# Patient Record
Sex: MALE | Race: WHITE | NOT HISPANIC OR LATINO | Employment: OTHER | URBAN - NONMETROPOLITAN AREA
[De-identification: names, ages, dates, MRNs, and addresses within clinical notes are randomized per-mention and may not be internally consistent; named-entity substitution may affect disease eponyms.]

---

## 2023-04-04 ENCOUNTER — HOSPITAL ENCOUNTER (INPATIENT)
Facility: HOSPITAL | Age: 73
LOS: 7 days | Discharge: HOME | DRG: 374 | End: 2023-04-11
Attending: FAMILY MEDICINE | Admitting: INTERNAL MEDICINE
Payer: OTHER GOVERNMENT

## 2023-04-04 DIAGNOSIS — Z74.09 IMPAIRED MOBILITY: ICD-10-CM

## 2023-04-04 DIAGNOSIS — I31.39 PERICARDIAL EFFUSION: Primary | ICD-10-CM

## 2023-04-04 PROCEDURE — 94799 UNLISTED PULMONARY SVC/PX: CPT

## 2023-04-04 PROCEDURE — 94660 CPAP INITIATION&MGMT: CPT

## 2023-04-04 RX ORDER — FENOFIBRATE 54 MG/1
54 TABLET ORAL DAILY
COMMUNITY

## 2023-04-04 RX ORDER — FUROSEMIDE 10 MG/ML
60 INJECTION INTRAMUSCULAR; INTRAVENOUS EVERY 12 HOURS
Status: DISCONTINUED | OUTPATIENT
Start: 2023-04-04 | End: 2023-04-05

## 2023-04-04 RX ORDER — COLCHICINE 0.6 MG/1
0.6 TABLET ORAL 2 TIMES DAILY
COMMUNITY

## 2023-04-04 RX ORDER — DOCUSATE SODIUM 100 MG/1
100 CAPSULE, LIQUID FILLED ORAL DAILY
COMMUNITY

## 2023-04-04 RX ORDER — TRAMADOL HYDROCHLORIDE 50 MG/1
50 TABLET ORAL 2 TIMES DAILY PRN
Status: DISCONTINUED | OUTPATIENT
Start: 2023-04-04 | End: 2023-04-11 | Stop reason: HOSPADM

## 2023-04-04 RX ORDER — MULTIPLE VITAMINS W/ MINERALS TAB 9MG-400MCG
1 TAB ORAL DAILY
COMMUNITY

## 2023-04-04 RX ORDER — FUROSEMIDE 40 MG/1
40 TABLET ORAL 2 TIMES DAILY
COMMUNITY

## 2023-04-04 RX ORDER — MELATONIN
1000 DAILY
COMMUNITY

## 2023-04-04 RX ORDER — TRAMADOL HYDROCHLORIDE 50 MG/1
50 TABLET ORAL 2 TIMES DAILY PRN
COMMUNITY

## 2023-04-04 RX ORDER — FERROUS SULFATE 325(65) MG
325 TABLET ORAL
COMMUNITY

## 2023-04-04 RX ORDER — HEPARIN SODIUM 5000 [USP'U]/ML
5000 INJECTION, SOLUTION INTRAVENOUS; SUBCUTANEOUS EVERY 8 HOURS SCHEDULED
Status: DISCONTINUED | OUTPATIENT
Start: 2023-04-04 | End: 2023-04-04

## 2023-04-04 RX ORDER — BUSPIRONE HYDROCHLORIDE 10 MG/1
10 TABLET ORAL 2 TIMES DAILY
COMMUNITY

## 2023-04-04 RX ORDER — COLCHICINE 0.6 MG/1
0.6 TABLET ORAL 2 TIMES DAILY
Status: DISCONTINUED | OUTPATIENT
Start: 2023-04-05 | End: 2023-04-11 | Stop reason: HOSPADM

## 2023-04-04 RX ORDER — BUSPIRONE HYDROCHLORIDE 10 MG/1
10 TABLET ORAL 2 TIMES DAILY
Status: DISCONTINUED | OUTPATIENT
Start: 2023-04-05 | End: 2023-04-11 | Stop reason: HOSPADM

## 2023-04-04 RX ORDER — MIRTAZAPINE 15 MG/1
15 TABLET, FILM COATED ORAL NIGHTLY
Status: DISCONTINUED | OUTPATIENT
Start: 2023-04-05 | End: 2023-04-11 | Stop reason: HOSPADM

## 2023-04-04 RX ORDER — PRAZOSIN HYDROCHLORIDE 5 MG/1
5-10 CAPSULE ORAL 2 TIMES DAILY
COMMUNITY

## 2023-04-04 RX ORDER — SOTALOL HYDROCHLORIDE 80 MG/1
80 TABLET ORAL 2 TIMES DAILY
COMMUNITY

## 2023-04-04 RX ORDER — MIRTAZAPINE 30 MG/1
15 TABLET, FILM COATED ORAL NIGHTLY
COMMUNITY

## 2023-04-04 RX ORDER — SOTALOL HYDROCHLORIDE 80 MG/1
80 TABLET ORAL 2 TIMES DAILY
Status: DISCONTINUED | OUTPATIENT
Start: 2023-04-05 | End: 2023-04-11 | Stop reason: HOSPADM

## 2023-04-04 RX ORDER — SERTRALINE HYDROCHLORIDE 100 MG/1
200 TABLET, FILM COATED ORAL DAILY
COMMUNITY

## 2023-04-04 RX ORDER — AMLODIPINE BESYLATE 10 MG/1
5 TABLET ORAL DAILY
COMMUNITY

## 2023-04-04 NOTE — Clinical Note
1150 ML BLOOD REMOVED FROM PERICARDIAL SACK. PATIENT REPORTS IMPROVEMENT IN BREATHING. BLOOD TO BE WALKED TO LAB FOR TESTING.

## 2023-04-04 NOTE — Clinical Note
Prepped: subxiphoid process and left abdomen. Prepped with: ChloraPrep. The site was clipped. The patient was draped in a sterile fashion. Apical.

## 2023-04-04 NOTE — Clinical Note
Pericardiocentesis performed by manual aspiration.Bloody fluid removed and 800 mL was sent to lab for analysis..

## 2023-04-05 ENCOUNTER — APPOINTMENT (OUTPATIENT)
Dept: CT IMAGING | Facility: HOSPITAL | Age: 73
DRG: 374 | End: 2023-04-05
Payer: OTHER GOVERNMENT

## 2023-04-05 ENCOUNTER — APPOINTMENT (OUTPATIENT)
Dept: CARDIOLOGY | Facility: HOSPITAL | Age: 73
DRG: 374 | End: 2023-04-05
Payer: OTHER GOVERNMENT

## 2023-04-05 PROBLEM — G47.33 OBSTRUCTIVE SLEEP APNEA: Status: ACTIVE | Noted: 2023-04-05

## 2023-04-05 PROBLEM — I10 ESSENTIAL HYPERTENSION: Status: ACTIVE | Noted: 2023-04-05

## 2023-04-05 PROBLEM — N18.31 STAGE 3A CHRONIC KIDNEY DISEASE: Status: ACTIVE | Noted: 2023-04-05

## 2023-04-05 PROBLEM — I31.39 PERICARDIAL EFFUSION: Status: ACTIVE | Noted: 2023-04-05

## 2023-04-05 PROBLEM — E66.01 OBESITY, MORBID, BMI 50 OR HIGHER: Status: ACTIVE | Noted: 2023-04-05

## 2023-04-05 PROBLEM — I48.0 PAROXYSMAL ATRIAL FIBRILLATION: Status: ACTIVE | Noted: 2023-04-05

## 2023-04-05 LAB
ANION GAP SERPL CALCULATED.3IONS-SCNC: 10 MMOL/L (ref 5–15)
BASOPHILS # BLD AUTO: 0.02 10*3/MM3 (ref 0–0.2)
BASOPHILS NFR BLD AUTO: 0.2 % (ref 0–1.5)
BH CV ECHO MEAS - AO MAX PG: 4.2 MMHG
BH CV ECHO MEAS - AO MEAN PG: 2 MMHG
BH CV ECHO MEAS - AO ROOT DIAM: 3.3 CM
BH CV ECHO MEAS - AO V2 MAX: 103 CM/SEC
BH CV ECHO MEAS - AO V2 VTI: 18 CM
BH CV ECHO MEAS - AVA(I,D): 2.9 CM2
BH CV ECHO MEAS - EDV(CUBED): 140.6 ML
BH CV ECHO MEAS - ESV(CUBED): 27 ML
BH CV ECHO MEAS - FS: 42.3 %
BH CV ECHO MEAS - IVS/LVPW: 0.92 CM
BH CV ECHO MEAS - IVSD: 1.2 CM
BH CV ECHO MEAS - LA DIMENSION: 4.2 CM
BH CV ECHO MEAS - LAT PEAK E' VEL: 8.5 CM/SEC
BH CV ECHO MEAS - LV MASS(C)D: 263.4 GRAMS
BH CV ECHO MEAS - LV MAX PG: 2.6 MMHG
BH CV ECHO MEAS - LV MEAN PG: 1 MMHG
BH CV ECHO MEAS - LV V1 MAX: 80.4 CM/SEC
BH CV ECHO MEAS - LV V1 VTI: 12.7 CM
BH CV ECHO MEAS - LVIDD: 5.2 CM
BH CV ECHO MEAS - LVIDS: 3 CM
BH CV ECHO MEAS - LVOT AREA: 4.2 CM2
BH CV ECHO MEAS - LVOT DIAM: 2.3 CM
BH CV ECHO MEAS - LVPWD: 1.3 CM
BH CV ECHO MEAS - MED PEAK E' VEL: 5.7 CM/SEC
BH CV ECHO MEAS - MV A MAX VEL: 63.4 CM/SEC
BH CV ECHO MEAS - MV DEC SLOPE: 189 CM/SEC2
BH CV ECHO MEAS - MV DEC TIME: 0.24 MSEC
BH CV ECHO MEAS - MV E MAX VEL: 45.2 CM/SEC
BH CV ECHO MEAS - MV E/A: 0.71
BH CV ECHO MEAS - MV MAX PG: 1.83 MMHG
BH CV ECHO MEAS - MV MEAN PG: 1 MMHG
BH CV ECHO MEAS - MV V2 VTI: 19.4 CM
BH CV ECHO MEAS - MVA(VTI): 2.7 CM2
BH CV ECHO MEAS - PA V2 MAX: 40 CM/SEC
BH CV ECHO MEAS - RAP SYSTOLE: 5 MMHG
BH CV ECHO MEAS - RV MAX PG: 0.75 MMHG
BH CV ECHO MEAS - RV V1 MAX: 43.3 CM/SEC
BH CV ECHO MEAS - RV V1 VTI: 8.3 CM
BH CV ECHO MEAS - RVDD: 3.6 CM
BH CV ECHO MEAS - RVSP: 22.3 MMHG
BH CV ECHO MEAS - SV(LVOT): 52.8 ML
BH CV ECHO MEAS - TAPSE (>1.6): 3.4 CM
BH CV ECHO MEAS - TR MAX PG: 17.3 MMHG
BH CV ECHO MEAS - TR MAX VEL: 208 CM/SEC
BH CV ECHO MEASUREMENTS AVERAGE E/E' RATIO: 6.37
BH CV XLRA - TDI S': 13.7 CM/SEC
BUN SERPL-MCNC: 42 MG/DL (ref 8–23)
BUN/CREAT SERPL: 30 (ref 7–25)
CALCIUM SPEC-SCNC: 9 MG/DL (ref 8.6–10.5)
CHLORIDE SERPL-SCNC: 102 MMOL/L (ref 98–107)
CO2 SERPL-SCNC: 33 MMOL/L (ref 22–29)
CREAT SERPL-MCNC: 1.4 MG/DL (ref 0.76–1.27)
CRP SERPL-MCNC: <0.3 MG/DL (ref 0–0.5)
DEPRECATED RDW RBC AUTO: 50 FL (ref 37–54)
EGFRCR SERPLBLD CKD-EPI 2021: 53.1 ML/MIN/1.73
EOSINOPHIL # BLD AUTO: 0.06 10*3/MM3 (ref 0–0.4)
EOSINOPHIL NFR BLD AUTO: 0.5 % (ref 0.3–6.2)
ERYTHROCYTE [DISTWIDTH] IN BLOOD BY AUTOMATED COUNT: 14.3 % (ref 12.3–15.4)
GLUCOSE SERPL-MCNC: 111 MG/DL (ref 65–99)
HCT VFR BLD AUTO: 42.1 % (ref 37.5–51)
HGB BLD-MCNC: 12.7 G/DL (ref 13–17.7)
IMM GRANULOCYTES # BLD AUTO: 0.1 10*3/MM3 (ref 0–0.05)
IMM GRANULOCYTES NFR BLD AUTO: 0.8 % (ref 0–0.5)
LEFT ATRIUM VOLUME INDEX: 40 ML/M2
LEFT ATRIUM VOLUME: 86.7 ML
LYMPHOCYTES # BLD AUTO: 0.59 10*3/MM3 (ref 0.7–3.1)
LYMPHOCYTES NFR BLD AUTO: 4.6 % (ref 19.6–45.3)
MAXIMAL PREDICTED HEART RATE: 147 BPM
MCH RBC QN AUTO: 28.7 PG (ref 26.6–33)
MCHC RBC AUTO-ENTMCNC: 30.2 G/DL (ref 31.5–35.7)
MCV RBC AUTO: 95.2 FL (ref 79–97)
MONOCYTES # BLD AUTO: 0.8 10*3/MM3 (ref 0.1–0.9)
MONOCYTES NFR BLD AUTO: 6.2 % (ref 5–12)
NEUTROPHILS NFR BLD AUTO: 11.24 10*3/MM3 (ref 1.7–7)
NEUTROPHILS NFR BLD AUTO: 87.7 % (ref 42.7–76)
NRBC BLD AUTO-RTO: 0 /100 WBC (ref 0–0.2)
PLATELET # BLD AUTO: 132 10*3/MM3 (ref 140–450)
PMV BLD AUTO: 12.2 FL (ref 6–12)
POTASSIUM SERPL-SCNC: 3.8 MMOL/L (ref 3.5–5.2)
RBC # BLD AUTO: 4.42 10*6/MM3 (ref 4.14–5.8)
SODIUM SERPL-SCNC: 145 MMOL/L (ref 136–145)
STRESS TARGET HR: 125 BPM
TSH SERPL DL<=0.05 MIU/L-ACNC: 2.72 UIU/ML (ref 0.27–4.2)
WBC NRBC COR # BLD: 12.81 10*3/MM3 (ref 3.4–10.8)

## 2023-04-05 PROCEDURE — 99232 SBSQ HOSP IP/OBS MODERATE 35: CPT | Performed by: SURGERY

## 2023-04-05 PROCEDURE — 25010000002 HEPARIN (PORCINE) PER 1000 UNITS: Performed by: INTERNAL MEDICINE

## 2023-04-05 PROCEDURE — 71250 CT THORAX DX C-: CPT

## 2023-04-05 PROCEDURE — 93306 TTE W/DOPPLER COMPLETE: CPT

## 2023-04-05 PROCEDURE — 86140 C-REACTIVE PROTEIN: CPT | Performed by: INTERNAL MEDICINE

## 2023-04-05 PROCEDURE — 80048 BASIC METABOLIC PNL TOTAL CA: CPT | Performed by: INTERNAL MEDICINE

## 2023-04-05 PROCEDURE — 84443 ASSAY THYROID STIM HORMONE: CPT | Performed by: INTERNAL MEDICINE

## 2023-04-05 PROCEDURE — 94799 UNLISTED PULMONARY SVC/PX: CPT

## 2023-04-05 PROCEDURE — 94761 N-INVAS EAR/PLS OXIMETRY MLT: CPT

## 2023-04-05 PROCEDURE — 25510000001 PERFLUTREN 6.52 MG/ML SUSPENSION: Performed by: INTERNAL MEDICINE

## 2023-04-05 PROCEDURE — 25010000002 FUROSEMIDE PER 20 MG: Performed by: INTERNAL MEDICINE

## 2023-04-05 PROCEDURE — 99222 1ST HOSP IP/OBS MODERATE 55: CPT | Performed by: INTERNAL MEDICINE

## 2023-04-05 PROCEDURE — 94660 CPAP INITIATION&MGMT: CPT

## 2023-04-05 PROCEDURE — 93306 TTE W/DOPPLER COMPLETE: CPT | Performed by: INTERNAL MEDICINE

## 2023-04-05 PROCEDURE — 85025 COMPLETE CBC W/AUTO DIFF WBC: CPT | Performed by: INTERNAL MEDICINE

## 2023-04-05 PROCEDURE — 74176 CT ABD & PELVIS W/O CONTRAST: CPT

## 2023-04-05 RX ORDER — HEPARIN SODIUM 5000 [USP'U]/ML
5000 INJECTION, SOLUTION INTRAVENOUS; SUBCUTANEOUS EVERY 8 HOURS SCHEDULED
Status: DISCONTINUED | OUTPATIENT
Start: 2023-04-05 | End: 2023-04-07

## 2023-04-05 RX ADMIN — SOTALOL HYDROCHLORIDE 80 MG: 80 TABLET ORAL at 00:38

## 2023-04-05 RX ADMIN — COLCHICINE 0.6 MG: 0.6 TABLET ORAL at 21:06

## 2023-04-05 RX ADMIN — FUROSEMIDE 60 MG: 10 INJECTION, SOLUTION INTRAMUSCULAR; INTRAVENOUS at 00:37

## 2023-04-05 RX ADMIN — SOTALOL HYDROCHLORIDE 80 MG: 80 TABLET ORAL at 08:34

## 2023-04-05 RX ADMIN — HEPARIN SODIUM 5000 UNITS: 5000 INJECTION INTRAVENOUS; SUBCUTANEOUS at 06:26

## 2023-04-05 RX ADMIN — MIRTAZAPINE 15 MG: 15 TABLET, FILM COATED ORAL at 21:06

## 2023-04-05 RX ADMIN — BUSPIRONE HYDROCHLORIDE 10 MG: 10 TABLET ORAL at 21:06

## 2023-04-05 RX ADMIN — MIRTAZAPINE 15 MG: 15 TABLET, FILM COATED ORAL at 00:36

## 2023-04-05 RX ADMIN — COLCHICINE 0.6 MG: 0.6 TABLET ORAL at 08:34

## 2023-04-05 RX ADMIN — BUSPIRONE HYDROCHLORIDE 10 MG: 10 TABLET ORAL at 08:34

## 2023-04-05 RX ADMIN — PERFLUTREN 13.04 MG: 6.52 INJECTION, SUSPENSION INTRAVENOUS at 11:41

## 2023-04-05 RX ADMIN — HEPARIN SODIUM 5000 UNITS: 5000 INJECTION INTRAVENOUS; SUBCUTANEOUS at 14:51

## 2023-04-05 RX ADMIN — HEPARIN SODIUM 5000 UNITS: 5000 INJECTION INTRAVENOUS; SUBCUTANEOUS at 21:06

## 2023-04-05 RX ADMIN — COLCHICINE 0.6 MG: 0.6 TABLET ORAL at 00:37

## 2023-04-05 RX ADMIN — SOTALOL HYDROCHLORIDE 80 MG: 80 TABLET ORAL at 21:06

## 2023-04-05 RX ADMIN — APIXABAN 5 MG: 5 TABLET, FILM COATED ORAL at 00:36

## 2023-04-05 RX ADMIN — BUSPIRONE HYDROCHLORIDE 10 MG: 10 TABLET ORAL at 00:36

## 2023-04-05 NOTE — PLAN OF CARE
Goal Outcome Evaluation:  Plan of Care Reviewed With: patient        Progress: no change  Outcome Evaluation: Patient transferred from [facility] with pericardial effusion, requires 3L oxygen. CPAP worn overnight. CT sugery and cardiology consult placed. Patient to get CTA in am. VSS, safety maintained. S/AF/SA 64-91 on tele.

## 2023-04-05 NOTE — PAYOR COMM NOTE
"4/5/23 [facility]    [phone]  PHONE [phone]    ER ADMIT TO INPATIENT ON 4/4/23. FAMARIA ELENA BLACKWELL          Norm Prado (72 y.o. Male)     Date of Birth   1950    Social Security Number       Address   [address] [place] [address]    Home Phone   [phone]    MRN   [id number]       Gnosticist   Other    Marital Status                               Admission Date   4/4/23    Admission Type   Urgent    Admitting Provider   Vinay Noland MD    Attending Provider   Ivis Spain    Department, Room/Bed   [facility] 4B, 465/1       Discharge Date       Discharge Disposition       Discharge Destination                               Attending Provider: Ivis Spain    Allergies: No Known Allergies    Isolation: None   Infection: None   Code Status: CPR    Ht: 155.4 cm (61.2\")   Wt: 125 kg (276 lb 9.6 oz)    Admission Cmt: None   Principal Problem: Pericardial effusion [I31.39]                 Active Insurance as of 4/4/2023     Primary Coverage     Payor Plan Insurance Group Employer/Plan Group    [facility] CCN OPTUM      Payor Plan Address Payor Plan Phone Number Payor Plan Fax Number Effective Dates    [address] [phone]  4/4/2023 - None Entered    [facility] [id number]       Subscriber Name Subscriber Birth Date Member ID       NORM PRADO 1950 [id number]           Secondary Coverage     Payor Plan Insurance Group Employer/Plan Group    [facility] MEDICARE REPLACEMENT [facility] MEDICARE REPLACEMENT [id number]     Payor Plan Address Payor Plan Phone Number Payor Plan Fax Number Effective Dates    [address] [address]   1/1/2023 - None Entered    [facility] [id number]       Subscriber Name Subscriber Birth Date Member ID       NORM PRADO 1950 [id number]                 Emergency Contacts      (Rel.) Home Phone Work Phone Mobile Phone    EVEMJ GUERRA (Spouse) -- -- [phone]    " RAFAEL KAISER (Relative) -- -- --           [facility] Encounter Date/Time: 2023 2102   Hospital Account: [id number]    MRN: [id number]   Patient:  Norm Prado   Contact Serial #: [id number]   SSN:          ENCOUNTER             Patient Class: Inpatient   Unit: [address] Service: Medicine     Bed: 465/1   Admitting Provider: Vinay Noland MD   Referring Physician: Provider, No Known   Attending Provider: Ivis Spain Diagnosis: Pericardial effusion [I3*               PATIENT          Name: Norm Prado : 1950 (72 yrs)   Address: [address] [place] [address] Sex: Male   City: [place] [address]   County: [place]   Marital Status:  Ethnicity: NOT                                                                              Race: WHITE   Primary Care Provider: Provider, No Known Patients Phone: Home Phone: [phone]         EMERGENCY CONTACT   Contact Name Legal Guardian? Relationship to Patient Home Phone Work Phone   1. MJ PRADO  2. RAFAEL KAISER No  No Spouse  Relative                GUARANTOR            Guarantor: Norm Prado     : 1950   Address: [address] [place] [address] Sex: Male     [place] [address]     Relation to Patient: Self       Home Phone: [phone]   Guarantor ID: [id number]       Work Phone:     GUARANTOR EMPLOYER   Employer:           Status: RETIRED   COVERAGE          PRIMARY INSURANCE   Payor: [employer] Plan: [facility] [place]   Group Number:   Insurance Type: INDEMNITY   Subscriber Name: NORM PRADO Subscriber : 1950   Subscriber ID: [id number] Coverage Address: [facility] [id number] [place] [address]   Pat. Rel. to Subscriber: Self Coverage Phone: [phone]   SECONDARY INSURANCE   Payor: [employer] MEDICARE REPLACEMENT Plan: [employer] MEDICARE REPLACEMENT   Group Number: [id number] Insurance Type: INDEMNITY   Subscriber Name: NORM PRADO Subscriber : 1950   Subscriber ID: [id number] Coverage Address:   [address] [address]  [place] [address]   Pat. Rel. to Subscriber: SELF Coverage Phone: [phone]      Contact Serial # ([id number])         April 5, 2023    Chart ID ([id number])           Vinay Noland MD   Physician  Hospitalist  H&P      Signed  Date of Service:  04/04/23 2141  Creation Time:  04/04/23 2141     Signed        [facility]  HISTORY AND PHYSICAL     Date of Admission: 4/4/2023  Primary Care Physician: Provider, No Known     Subjective   Primary Historian: Patient        Chief Complaint: Shortness of breath        History of Present Illness  Patient is a 72-year-old male with medical history of morbid obesity, A-fib on sotalol and Eliquis, FELIX on CPAP at night, hypertension, presents to [facility] with complaints of worsening shortness of breath.  Patient reports that he was recently diagnosed with FELIX and prescribed CPAP machine at home which he has been compliant with.  He also reported being diagnosed with pericardial fusion in March where he was managed at [facility] for A-fib RVR and pericardial effusion.  He was started on colchicine at that time, and lasix and discharged home.  He represented at [facility] with worsening shortness of breath noted to be in A-fib RVR as well.  Patient was started on Cardizem drip, placed on BiPAP for airway support, CT chest reported large pericardial effusion with bilateral pleural effusions but no sign of tamponade.  Patient was discussed with CT surgery Dr. Yip according to chart review and patient was accepted for transfer for CT surgery evaluation and management.  Patient seen on arrival to this facility on oxygen supplementation, denies chest pain or any other symptoms or complaints.  Patient admitted for medical care.           Review of Systems   Otherwise complete ROS reviewed and negative except as  "mentioned in the HPI.     Past Medical History: Morbid obesity, A-fib, FELIX on CPAP, hypertension, pericardial fusion, pleural effusion,  Past Surgical History:  Surgical History   History reviewed. No pertinent surgical history.     Social History:  reports that he quit smoking about 3 years ago. His smoking use included cigarettes. He has never used smokeless tobacco. He reports that he does not currently use alcohol. He reports that he does not use drugs.     Family History: CAD, hypertension,     Allergies:  No Known Allergies     Medications:              Vital Signs: /72 (BP Location: Right arm, Patient Position: Lying)   Pulse 81   Temp 97.4 °F (36.3 °C)   Resp 18   Ht 155.4 cm (61.2\")   Wt 125 kg (276 lb 9.6 oz)   SpO2 93%   BMI 51.92 kg/m²   Physical Exam:   Temp:  [97.4 °F (36.3 °C)-97.6 °F (36.4 °C)] 97.6 °F (36.4 °C)  Heart Rate:  [81-83] 81  Resp:  [18-20] 20  BP: ()/(70-72) 96/70  Physical Exam     General: Morbidly obese NC/AT, appears stated age, alert and oriented x3  HEENT: PERRLA, EOMI, no scleral icterus, no conjunctival injection,   NECK:  Neck is supple, no JVD, no supraclavicular lymphadenopathy  CV: S1 S2 irregularly irregular  no murmurs, no gallops, no heaves, pulses palpable.  LUNG: Diminished breath sounds lower lung fields with poor air movement.  ABD: Large abdominal girth, obese, nondistended, nontender, difficult to auscultate due to body habitus, no guarding or rebound.  EXT: FROM, strength is intact, 2+ pitting edema, no joint effusion, no calf tenderness.  Pulses palpable  Neuro: CN 2-12, grossly intact,no  focal weakness appreciated  Skin: Warm and dry, no rash or lesions.        Results Reviewed:  Lab work from transferring facility.  Chemistry: Sodium 144, potassium 3.8, chloride 102, CO2 32.5, BUN 43, creatinine 1.55, total protein 6.9, albumin 3.5, total bilirubin 0.51, alk phos 69, AST 19, ALT 44, calcium 8.2,            Imaging Results (Last 24 Hours)      " ** No results found for the last 24 hours. **          I have personally reviewed and interpreted the radiology studies and ECG obtained at time of admission.      Assessment / Plan   Assessment and plan:   72-year-old male with medical history of morbid obesity, A-fib on sotalol and Eliquis, FELIX, hypertension, presenting with worsening pleural effusion and pericardial effusion with no tamponade.  Patient is also respiratory distress requiring oxygen supplementation.  Patient also in A-fib RVR due to respiratory distress. He is admitted for further medical care.           Problem list:  1.  Acute on chronic respiratory failure  2.  A-fib RVR  3.  Bilateral pleural effusion  4.  Pericardial effusion without tamponade  5.  Fluid overload  6.  Acute renal failure           Treatment Plan  The patient will be admitted to my service here at [facility].  -Admit to inpatient  - Oxygen supplementation, CPAP/BiPAP as needed  - Telemetry monitoring  - IV Lopressor and Cardizem for A-fib RVR, consider Cardizem drip if difficult to control  - Aggressive IV diuresing Lasix 60 mg twice a day  - Breathing treatments with DuoNebs  - Patient had recent echocardiogram, defer repeating echo to cardiology.  Patient may require LUCAS instead.  - Hold Eliquis anticoagulation for possible intervention by CT surgery.  - Consult CT surgery in the a.m.  Consider CT abdomen/pelvis for work-up of his volume overload, Per echo reportedly patient has no history of heart failure as EF is 55 to 65% with mild LVH.     Reviewed the rest of his home medications, restart those appropriate for management of his other comorbid conditions.        Patient is a full code  Time spent in admission greater than 30 minutes.  Anticipate length of stay greater than 2 nights.        Medical Decision Making  As discussed above        Conditions and Status  Patient is clinically stable        MDM Data  External documents reviewed: As discussed  above  Cardiac tracing (EKG, telemetry) interpretation: As discussed above  Radiology interpretation: As discussed above  Labs reviewed: As discussed above           Care Planning  Patient is a full code     Disposition  Patient can discharge home after resolution of acute condition and returned to baseline.  No  needed identified at this time.            Lisa Hurst, RN   Registered Nurse  Cardiology  Plan of Care      Signed  Date of Service:  04/05/23 0355  Creation Time:  04/05/23 0355     Signed           Goal Outcome Evaluation:  Plan of Care Reviewed With: patient  Progress: no change  Outcome Evaluation: Patient transferred from [facility] with pericardial effusion, requires 3L oxygen. CPAP worn overnight. CT sugery and cardiology consult placed. Patient to get CTA in am. VSS, safety maintained. S/AF/SA 64-91 on tele.                 Shayne Eid MD   Physician  Cardiology  Consults      Signed  Date of Service:  04/05/23 0738  Creation Time:  04/05/23 0738  Consult Orders   Inpatient Cardiology Consult [[id number]] ordered by Vinay Noland MD at 04/04/23 2314          Signed        Expand AllCollapse All        LOS: 1 day   Patient Care Team:  Provider, No Known as PCP - General     Chief Complaint: Shortness of breath     Ez Tracey is a 72 y.o. male who is being seen in evaluation.  Patient has been admitted with substantial pericardial effusion  According to patient he needed hip surgery  This subsequently resulted in further work-up  He was noted to have a large pericardial effusion  Was in outside hospital  Was transferred to [facility] to address this pericardial effusion  CT surgery has been consulted  Denies any chest pain  No sustained palpitations  Cardiac monitor shows paroxysmal atrial fibrillation intermittent spurts with sinus rhythm  No bleeding issues  No falls  No presyncope  No syncope  No orthopnea  No paroxysmal nocturnal dyspnea  Has  obstructive sleep apnea  On noninvasive positive pressure ventilation     Telemetry: no malignant arrhythmia. No significant pauses.  Paroxysmal atrial fibrillation currently in sinus rhythm  Review of Systems   Constitutional: No chills   Has fatigue   No fever.   HENT: Negative.    Eyes: Negative.    Respiratory: Negative for cough,   No chest wall soreness,   Shortness of breath,   no wheezing, no stridor.    Cardiovascular: As above  Gastrointestinal: Negative for abdominal distention,  No abdominal pain,   No blood in stool,   No constipation,   No diarrhea,   No nausea   No vomiting.   Endocrine: Negative.    Genitourinary: Negative for difficulty urinating, dysuria, flank pain and hematuria.   Musculoskeletal: Negative.    Skin: Negative for rash and wound.   Allergic/Immunologic: Negative.    Neurological: Negative for dizziness, syncope, weakness,   No light-headedness  No  headaches.   Hematological: Does not bruise/bleed easily.   Psychiatric/Behavioral: Negative for agitation or behavioral problems,   No confusion,   the patient is  nervous/anxious.        History:   Medical History        Past Medical History:   Diagnosis Date   • Atrial fibrillation with rapid ventricular response     • Essential hypertension     • Obesity due to excess calories with serious comorbidity     • Pneumonia     • Respiratory failure     • Sleep apnea           Surgical History   History reviewed. No pertinent surgical history.     Social History   Social History            Socioeconomic History   • Marital status:    Tobacco Use   • Smoking status: Former       Years: 40.00       Types: Cigarettes       Quit date: 4/4/2020       Years since quitting: 3.0   • Smokeless tobacco: Never   Vaping Use   • Vaping Use: Never used   Substance and Sexual Activity   • Alcohol use: Not Currently   • Drug use: Never         History reviewed. No pertinent family history.     Labs:  WBC       WBC   Date Value Ref Range Status    04/05/2023 12.81 (H) 3.40 - 10.80 10*3/mm3 Final       HGB       Hemoglobin   Date Value Ref Range Status   04/05/2023 12.7 (L) 13.0 - 17.7 g/dL Final       HCT       Hematocrit   Date Value Ref Range Status   04/05/2023 42.1 37.5 - 51.0 % Final       Platelets       Platelets   Date Value Ref Range Status   04/05/2023 132 (L) 140 - 450 10*3/mm3 Final       MCV       MCV   Date Value Ref Range Status   04/05/2023 95.2 79.0 - 97.0 fL Final               Results from last 7 days   Lab Units 04/05/23  0005   SODIUM mmol/L 145   POTASSIUM mmol/L 3.8   CHLORIDE mmol/L 102   CO2 mmol/L 33.0*   BUN mg/dL 42*   CREATININE mg/dL 1.40*   CALCIUM mg/dL 9.0   GLUCOSE mg/dL 111*   No results found for: CKTOTAL, CKMB, CKMBINDEX, TROPONINI, TROPONINT  PT/INR:  No results found for: PROTIME/No results found for: INR         Imaging Results (Last 72 Hours)      ** No results found for the last 72 hours. **                Objective         No Known Allergies     Medication Review: Performed  Current Medications             Current Facility-Administered Medications   Medication Dose Route Frequency Provider Last Rate Last Admin   • busPIRone (BUSPAR) tablet 10 mg  10 mg Oral BID Vinay Noland MD   10 mg at 04/05/23 0036   • colchicine tablet 0.6 mg  0.6 mg Oral BID Vinay Noland MD   0.6 mg at 04/05/23 0037   • furosemide (LASIX) injection 60 mg  60 mg Intravenous Q12H Vinay Noland MD   60 mg at 04/05/23 0037   • heparin (porcine) 5000 UNIT/ML injection 5,000 Units  5,000 Units Subcutaneous Q8H Vinay Noland MD   5,000 Units at 04/05/23 0626   • mirtazapine (REMERON) tablet 15 mg  15 mg Oral Nightly Vinay Noland MD   15 mg at 04/05/23 0036   • sotalol (BETAPACE) tablet 80 mg  80 mg Oral BID Vinay Noland MD   80 mg at 04/05/23 0038   • traMADol (ULTRAM) tablet 50 mg  50 mg Oral BID PRN Vinay Noland MD                Vital Sign Min/Max for last 24 hours  Temp  Min: 97.4 °F (36.3 °C)  Max:  "98.4 °F (36.9 °C)   BP  Min: 96/70  Max: 113/79   Pulse  Min: 75  Max: 88   Resp  Min: 18  Max: 21   SpO2  Min: 93 %  Max: 95 %   No data recorded   Weight  Min: 125 kg (276 lb 9.6 oz)  Max: 125 kg (276 lb 9.6 oz)          Flowsheet Rows    Flowsheet Row First Filed Value   Admission Height 155.4 cm (61.2\") Documented at 04/04/2023 2100   Admission Weight 125 kg (276 lb 9.6 oz) Documented at 04/04/2023 2100                Physical Exam:     General Appearance: Awake, alert, in no acute distress  Eyes: Pupils equal and reactive    Ears: Appear intact with no abnormalities noted  Nose: Nares normal, no drainage  Neck: supple, trachea midline, no carotid bruit and no JVD  Back: no kyphosis present,    Lungs: respirations regular, respirations even and respirations unlabored  Heart: normal S1, S2, no significant murmurs   No gallops or rubs  no rub and no click  Abdomen: normal bowel sounds, no tenderness   Skin: no bleeding, bruising or rash  Extremities: no cyanosis  Psychiatric/Behavioral: Negative for agitation, behavioral problems, confusion, the patient does  appear to be nervous/anxious.        Results Review:   I reviewed the patient's new clinical results.  I reviewed the patient's new imaging results and agree with the interpretation.  I reviewed the patient's other test results and agree with the interpretation  I personally viewed and interpreted the patient's EKG/Telemetry data     Discussed with patient  Updated patient regarding any new or relevant abnormalities on review of records or any new findings on physical exam.   Mentioned to patient about purpose of visit and desirable health short and long term goals and objectives.      Reviewed available prior notes, consults, prior visits, laboratory findings, radiology and cardiology relevant reports.   Updated chart as applicable.   I have reviewed the patient's medical history in detail and updated the computerized patient record as relevant.  "                  Assessment & Plan          Pericardial effusion  Paroxysmal atrial fibrillation  Superobesity  BMI of 51.92  Obstructive sleep apnea        Plan        CT surgery has already been consulted  We will get echocardiogram today to assess size of pericardial effusion  Further recommendation pending results of above  Recommendations discussed with patient  Discontinue IV diuretics to avoid precipitating cardiac tamponade  Currently clinically not in any tamponade physiology  Has been on anticoagulation with Eliquis per patient which is on hold  Is on 5000 units of subcutaneous heparin every 8 hours  Is on colchicine therapy  Can continue this     Telemetry  Deep vein thrombosis prophylaxis/precautions  Appropriate diet, fluid, sodium, caffeine, stimulants intake   Questions were encouraged, asked and answered to the patient's  understanding and satisfaction.  Compliance to diet and medications         Shayne Eid MD  04/05/23  07:39 CDT     EMR Dragon/Transcription was used to dictate part of this note                          Time Temp Pulse Resp BP Patient Position Device (Oxygen Therapy) Flow (L/min) Oxygen Concentration (%) SpO2   04/05/23 0714 98.4 (36.9) 78 21 110/82 Lying nasal cannula 3 -- 94   04/05/23 0603 -- 75 18 -- -- nasal cannula 3 -- 94   04/05/23 0400 98.3 (36.8) 88 20 113/79 Lying NPPV/NIV 3 -- 94   04/05/23 0324 -- 88 20 -- -- NPPV/NIV 3 40 94   04/05/23 0038 -- 81 -- -- -- -- -- -- --   04/04/23 2318 97.6 (36.4) 83 20 96/70 Lying nasal cannula 3 -- 95   04/04/23 2300 -- -- -- -- -- -- -- 40 --   04/04/23 2100 97.4 (36.3) 81 18 109/72 Lying nasal cannula 3 -- 93                 Current Facility-Administered Medications   Medication Dose Route Frequency Provider Last Rate Last Admin   • busPIRone (BUSPAR) tablet 10 mg  10 mg Oral BID Vinay Noland MD   10 mg at 04/05/23 0036   • colchicine tablet 0.6 mg  0.6 mg Oral BID Vinay Noland MD   0.6 mg at 04/05/23 0037   • heparin  (porcine) 5000 UNIT/ML injection 5,000 Units  5,000 Units Subcutaneous Q8H Vinay Noland MD   5,000 Units at 04/05/23 0626   • mirtazapine (REMERON) tablet 15 mg  15 mg Oral Nightly Vinay Noland MD   15 mg at 04/05/23 0036   • sotalol (BETAPACE) tablet 80 mg  80 mg Oral BID Vinay Noland MD   80 mg at 04/05/23 0038   • traMADol (ULTRAM) tablet 50 mg  50 mg Oral BID PRN Vinay Noland MD

## 2023-04-05 NOTE — PROGRESS NOTES
[facility] Medicine Services  INPATIENT PROGRESS NOTE    Patient Name: Norm Tracey  Date of Admission: 4/4/2023  Today's Date: 04/05/23  Length of Stay: 1  Primary Care Physician: Provider, No Known    Subjective   Chief Complaint: Pericardial effusion  HPI   Patient has been up and around in room.  Ambulatory to bathroom.  Currently sitting on the side of the bed.  He complains he is not had any sleep all night.  He has not had any particular shortness of breath.  No chest pain his wife is at the bedside.  She is wanting to know exactly what the problem with the patient is.  I did explain to her what a pericardial effusion is and why it could be a problem.  Cardiothoracic surgery NP and cardiology have seen patient this morning.  Echo was pending.        Review of Systems   All pertinent negatives and positives are as above. All other systems have been reviewed and are negative unless otherwise stated.     Objective    Temp:  [97.4 °F (36.3 °C)-98.4 °F (36.9 °C)] 98.4 °F (36.9 °C)  Heart Rate:  [75-88] 78  Resp:  [18-21] 21  BP: ()/(70-82) 110/82  Physical Exam  Vitals and nursing note reviewed.   Constitutional:       Appearance: He is obese.   HENT:      Head: Normocephalic and atraumatic.      Right Ear: External ear normal.      Left Ear: External ear normal.      Nose: Nose normal.      Mouth/Throat:      Mouth: Mucous membranes are moist.   Eyes:      Extraocular Movements: Extraocular movements intact.      Conjunctiva/sclera: Conjunctivae normal.      Pupils: Pupils are equal, round, and reactive to light.   Cardiovascular:      Rate and Rhythm: Normal rate and regular rhythm.      Pulses: Normal pulses.      Heart sounds: No murmur heard.    No friction rub. No gallop.      Comments: Rhythm is currently sinus rhythm with first-degree AV block.  Per monitor room patient's been in and out of A-fib during the night rate controlled 68-91.  Pulmonary:      Effort:  Pulmonary effort is normal.      Breath sounds: Normal breath sounds.   Abdominal:      General: Bowel sounds are normal.      Palpations: Abdomen is soft.   Musculoskeletal:         General: Normal range of motion.      Cervical back: Normal range of motion and neck supple.   Skin:     General: Skin is warm and dry.      Capillary Refill: Capillary refill takes less than 2 seconds.   Neurological:      General: No focal deficit present.      Mental Status: He is alert and oriented to person, place, and time.      Cranial Nerves: No cranial nerve deficit.   Psychiatric:         Mood and Affect: Mood normal.         Behavior: Behavior normal.             Results Review:  I have reviewed the labs, radiology results, and diagnostic studies.    Laboratory Data:   Results from last 7 days   Lab Units 04/05/23  0530   WBC 10*3/mm3 12.81*   HEMOGLOBIN g/dL 12.7*   HEMATOCRIT % 42.1   PLATELETS 10*3/mm3 132*        Results from last 7 days   Lab Units 04/05/23  0005   SODIUM mmol/L 145   POTASSIUM mmol/L 3.8   CHLORIDE mmol/L 102   CO2 mmol/L 33.0*   BUN mg/dL 42*   CREATININE mg/dL 1.40*   CALCIUM mg/dL 9.0   GLUCOSE mg/dL 111*       Culture Data:   No results found for: BLOODCX, URINECX, WOUNDCX, MRSACX, RESPCX, STOOLCX    Radiology Data:   Imaging Results (Last 24 Hours)     Procedure Component Value Units Date/Time    CT Chest Without Contrast Diagnostic [[id number]] Collected: 04/05/23 0913     Updated: 04/05/23 0932    Narrative:      EXAMINATION: CT CHEST WO CONTRAST DIAGNOSTIC-      4/5/2023 8:48 AM CDT     HISTORY: pericardial effusion     In order to have a CT radiation dose as low as reasonably achievable  Automated Exposure Control was utilized for adjustment of the mA and/or  KV according to patient size.     DLP in mGycm= 969     The CT scan of the chest is performed without intravenous contrast  enhancement.     Images are acquired in axial plane and subsequent reconstruction in  coronal and sagittal planes.      There is no previous similar study for comparison.     There is significant respiratory motion artifacts which is noted  diagnostic quality of the study. This may obscure a subtle  nodule/lesion.     The lungs are poorly expanded.     There is a moderate right basal pleural effusion and a trace left basal  pleural effusion.     There is a right lower lobar consolidation adjacent to the pleural  effusion which may suggest compression atelectasis. Mild atelectatic  changes and pleural thickening is seen at the left base posteriorly.     Visualized central airway is patent. The distal airway is poorly  visualized and evaluated due to respiratory motion artifacts. No  significant endobronchial abnormality or a lesion.     The limited visualized soft tissues of the neck are unremarkable.  Probable low-density nodule in the left lobe of the thyroid gland. There  is no axillary lymphadenopathy.     Atheromatous changes of the thoracic aorta noted. No aneurysmal  dilatation. The pulmonary arteries are of normal size and shape. The  coronary arteries are not optimally visualized or evaluated. No  significant calcification the course of the coronary arteries.     There is a large pericardial effusion which measures up to 3.2 cm in  width.     There is a large Bochdalek hernia with peritoneal fat and infiltration.     The limited visualized unenhanced abdomen show small amount of fluid in  the upper abdomen around the liver and spleen. Exophytic nodule/masses  are seen in the limited visualized kidneys. The gallbladder is small and  contracted. No radiopaque calculi are noted. Pancreas and kidneys are  incompletely included and not evaluated. The abdomen is separately  reviewed and reported with same day CT scan of the abdomen     Images reviewed in bone windows show chronic degenerative changes of the  thoracic spine with a mild levoscoliosis. No acute bony abnormality.  There is accentuated thoracic kyphosis. Hardware  fusion of the lower  cervical spine is visualized. No hardware complication. Old healed  fractures of the left ribs are noted.       Impression:      1. A large pericardial effusion.  2. A moderate right and a trace left pleural effusion.  3. Right lower lobar consolidation/compression atelectasis.                                   This report was finalized on 04/05/2023 09:29 by Dr. Carly Espino MD.    CT Abdomen Pelvis Without Contrast [[id number]] Collected: 04/05/23 0909     Updated: 04/05/23 0922    Narrative:      EXAM/TECHNIQUE: CT abdomen pelvis without contrast     INDICATION: Lymphadenopathy, groin     COMPARISON: None     DLP: 969 mGy cm. Automated exposure control was also utilized to  decrease patient radiation dose.     FINDINGS:     Large pericardial effusion measuring up to 3 cm in thickness. Moderate  RIGHT pleural effusion with atelectasis.     Unenhanced liver appears unremarkable. Gallbladder is decompressed. No  gallstones or biliary ductal dilatation.      Pancreas, spleen, and RIGHT adrenal gland are unremarkable.  Indeterminate 4.0 x 2.3 cm LEFT adrenal gland nodule.      Multiple bilateral low-density renal lesions compatible with cysts. No  renal or ureteral calculi. No hydronephrosis. 1.7 cm calculus layering  in the posterior midline urinary bladder. Small locules of gas in the  urinary bladder lumen are noted. No focal urinary bladder wall  thickening.     Colonic diverticulosis without evidence of diverticulitis. No colonic  wall thickening or pericolonic fat stranding. No abnormal small bowel  distention or evidence of active small bowel inflammation. No  pneumatosis or evidence of pneumoperitoneum.     Small volume ascites throughout the abdomen and pelvis. No pelvic mass  organized pelvic collection. Prostate is enlarged measuring 5 cm  transverse dimension. Nonaneurysmal atherosclerotic abdominal aorta. No  enlarged abdominal or pelvic lymph nodes. Moderate to large  right-sided  hydrocele.     Diffuse body wall soft tissue edema. Severe osteoarthritis in the LEFT  hip. Grade 1 anterolisthesis of L4 on L5.       Impression:         1.  Large pericardial effusion. Close clinical follow-up is recommended.  2.  Moderate RIGHT pleural effusion with overlying atelectasis.  3.  Small volume ascites throughout the abdomen and pelvis.  4.  No abdominal or pelvic lymphadenopathy.  5.  Moderate to large right-sided hydrocele.  6.  1.7 cm calculus in the urinary bladder.  7.  Indeterminate 4 cm LEFT adrenal gland nodule. Differential includes  adrenal gland mass as well as focal adrenal gland hemorrhage. Recommend  follow-up adrenal protocol CT or MR.  8.  Enlarged prostate.  This report was finalized on 04/05/2023 09:19 by Dr. Jacoby Oscar MD.          I have reviewed the patient's current medications.     Assessment/Plan   Assessment  Active Hospital Problems    Diagnosis    • **Pericardial effusion    • Essential hypertension    • Obstructive sleep apnea    • Obesity, morbid, BMI 50 or higher    • Paroxysmal atrial fibrillation    • Stage 3a chronic kidney disease        Treatment Plan  Echocardiogram pending  Further treatment plan per cardiothoracic surgery  Will clarify with cardiothoracic surgery if they are opposed to the heparin subcutaneous every 8 hours for he is VTE prophylaxis/coverage for his paroxysmal atrial fibrillation.  CMP CBC in a.m.      Medical Decision Making  Number and Complexity of problems:   Pericardial effusion high complexity   Essential hypertension moderate complexity   Obstructive sleep apnea moderate complexity   Morbid obesity moderate complexity paroxysmal atrial fibrillation high complexity  Stage IIIa chronic kidney disease moderate complexity    Differential Diagnosis: Malignant pericardial effusion    Conditions and Status        Condition is unchanged.     [facility] Data  External documents reviewed: No new documents  Cardiac tracing (EKG, telemetry)  interpretation: Reviewed  Radiology interpretation: Reviewed  Labs reviewed: Reviewed  Any tests that were considered but not ordered: None     Decision rules/scores evaluated (example [id number], Wells, etc):   [id number]  Discussed with: Patient and wife     Care Planning  Shared decision making: Patient and wife  Code status and discussions: Full    Disposition  Social Determinants of Health that impact treatment or disposition: None  I expect the patient to be discharged to home in 2-3 days.     Electronically signed by Ivis Spain, 04/05/23, 10:43 CDT.

## 2023-04-05 NOTE — CONSULTS
LOS: 1 day   Patient Care Team:  Provider, No Known as PCP - General    Chief Complaint: Shortness of breath     Ez Tracey is a 72 y.o. male who is being seen in evaluation.  Patient has been admitted with substantial pericardial effusion  According to patient he needed hip surgery  This subsequently resulted in further work-up  He was noted to have a large pericardial effusion  Was in outside hospital  Was transferred to [facility] to address this pericardial effusion  CT surgery has been consulted  Denies any chest pain  No sustained palpitations  Cardiac monitor shows paroxysmal atrial fibrillation intermittent spurts with sinus rhythm  No bleeding issues  No falls  No presyncope  No syncope  No orthopnea  No paroxysmal nocturnal dyspnea  Has obstructive sleep apnea  On noninvasive positive pressure ventilation    Telemetry: no malignant arrhythmia. No significant pauses.  Paroxysmal atrial fibrillation currently in sinus rhythm  Review of Systems   Constitutional: No chills   Has fatigue   No fever.   HENT: Negative.    Eyes: Negative.    Respiratory: Negative for cough,   No chest wall soreness,   Shortness of breath,   no wheezing, no stridor.    Cardiovascular: As above  Gastrointestinal: Negative for abdominal distention,  No abdominal pain,   No blood in stool,   No constipation,   No diarrhea,   No nausea   No vomiting.   Endocrine: Negative.    Genitourinary: Negative for difficulty urinating, dysuria, flank pain and hematuria.   Musculoskeletal: Negative.    Skin: Negative for rash and wound.   Allergic/Immunologic: Negative.    Neurological: Negative for dizziness, syncope, weakness,   No light-headedness  No  headaches.   Hematological: Does not bruise/bleed easily.   Psychiatric/Behavioral: Negative for agitation or behavioral problems,   No confusion,   the patient is  nervous/anxious.       History:   Past Medical History:   Diagnosis Date   • Atrial fibrillation with  rapid ventricular response    • Essential hypertension    • Obesity due to excess calories with serious comorbidity    • Pneumonia    • Respiratory failure    • Sleep apnea      History reviewed. No pertinent surgical history.  Social History     Socioeconomic History   • Marital status:    Tobacco Use   • Smoking status: Former     Years: 40.00     Types: Cigarettes     Quit date: 4/4/2020     Years since quitting: 3.0   • Smokeless tobacco: Never   Vaping Use   • Vaping Use: Never used   Substance and Sexual Activity   • Alcohol use: Not Currently   • Drug use: Never     History reviewed. No pertinent family history.    Labs:  WBC WBC   Date Value Ref Range Status   04/05/2023 12.81 (H) 3.40 - 10.80 10*3/mm3 Final      HGB Hemoglobin   Date Value Ref Range Status   04/05/2023 12.7 (L) 13.0 - 17.7 g/dL Final      HCT Hematocrit   Date Value Ref Range Status   04/05/2023 42.1 37.5 - 51.0 % Final      Platelets Platelets   Date Value Ref Range Status   04/05/2023 132 (L) 140 - 450 10*3/mm3 Final      MCV MCV   Date Value Ref Range Status   04/05/2023 95.2 79.0 - 97.0 fL Final        Results from last 7 days   Lab Units 04/05/23  0005   SODIUM mmol/L 145   POTASSIUM mmol/L 3.8   CHLORIDE mmol/L 102   CO2 mmol/L 33.0*   BUN mg/dL 42*   CREATININE mg/dL 1.40*   CALCIUM mg/dL 9.0   GLUCOSE mg/dL 111*   No results found for: CKTOTAL, CKMB, CKMBINDEX, TROPONINI, TROPONINT  PT/INR:  No results found for: PROTIME/No results found for: INR    Imaging Results (Last 72 Hours)     ** No results found for the last 72 hours. **          Objective     No Known Allergies    Medication Review: Performed  Current Facility-Administered Medications   Medication Dose Route Frequency Provider Last Rate Last Admin   • busPIRone (BUSPAR) tablet 10 mg  10 mg Oral BID Vinay Noland MD   10 mg at 04/05/23 0036   • colchicine tablet 0.6 mg  0.6 mg Oral BID Vinay Noland MD   0.6 mg at 04/05/23 0037   • furosemide (LASIX)  "injection 60 mg  60 mg Intravenous Q12H Vinay Noland MD   60 mg at 04/05/23 0037   • heparin (porcine) 5000 UNIT/ML injection 5,000 Units  5,000 Units Subcutaneous Q8H Vinay Noland MD   5,000 Units at 04/05/23 0626   • mirtazapine (REMERON) tablet 15 mg  15 mg Oral Nightly Vinay Noland MD   15 mg at 04/05/23 0036   • sotalol (BETAPACE) tablet 80 mg  80 mg Oral BID Vinay Noland MD   80 mg at 04/05/23 0038   • traMADol (ULTRAM) tablet 50 mg  50 mg Oral BID PRN Vinay Noland MD           Vital Sign Min/Max for last 24 hours  Temp  Min: 97.4 °F (36.3 °C)  Max: 98.4 °F (36.9 °C)   BP  Min: 96/70  Max: 113/79   Pulse  Min: 75  Max: 88   Resp  Min: 18  Max: 21   SpO2  Min: 93 %  Max: 95 %   No data recorded   Weight  Min: 125 kg (276 lb 9.6 oz)  Max: 125 kg (276 lb 9.6 oz)     Flowsheet Rows    Flowsheet Row First Filed Value   Admission Height 155.4 cm (61.2\") Documented at 04/04/2023 2100   Admission Weight 125 kg (276 lb 9.6 oz) Documented at 04/04/2023 2100              Physical Exam:    General Appearance: Awake, alert, in no acute distress  Eyes: Pupils equal and reactive    Ears: Appear intact with no abnormalities noted  Nose: Nares normal, no drainage  Neck: supple, trachea midline, no carotid bruit and no JVD  Back: no kyphosis present,    Lungs: respirations regular, respirations even and respirations unlabored  Heart: normal S1, S2, no significant murmurs   No gallops or rubs  no rub and no click  Abdomen: normal bowel sounds, no tenderness   Skin: no bleeding, bruising or rash  Extremities: no cyanosis  Psychiatric/Behavioral: Negative for agitation, behavioral problems, confusion, the patient does  appear to be nervous/anxious.       Results Review:   I reviewed the patient's new clinical results.  I reviewed the patient's new imaging results and agree with the interpretation.  I reviewed the patient's other test results and agree with the interpretation  I personally viewed " and interpreted the patient's EKG/Telemetry data    Discussed with patient  Updated patient regarding any new or relevant abnormalities on review of records or any new findings on physical exam.   Mentioned to patient about purpose of visit and desirable health short and long term goals and objectives.     Reviewed available prior notes, consults, prior visits, laboratory findings, radiology and cardiology relevant reports.   Updated chart as applicable.   I have reviewed the patient's medical history in detail and updated the computerized patient record as relevant.          Assessment & Plan       Pericardial effusion  Paroxysmal atrial fibrillation  Superobesity  BMI of 51.92  Obstructive sleep apnea      Plan      CT surgery has already been consulted  We will get echocardiogram today to assess size of pericardial effusion  Further recommendation pending results of above  Recommendations discussed with patient  Discontinue IV diuretics to avoid precipitating cardiac tamponade  Currently clinically not in any tamponade physiology  Has been on anticoagulation with Eliquis per patient which is on hold  Is on 5000 units of subcutaneous heparin every 8 hours  Is on colchicine therapy  Can continue this    Telemetry  Deep vein thrombosis prophylaxis/precautions  Appropriate diet, fluid, sodium, caffeine, stimulants intake   Questions were encouraged, asked and answered to the patient's  understanding and satisfaction.  Compliance to diet and medications       Shayne Eid MD  04/05/23  07:39 CDT    EMR Dragon/Transcription was used to dictate part of this note

## 2023-04-05 NOTE — PLAN OF CARE
Goal Outcome Evaluation:   Patient currently off unit for echo. CT scan results pending. NPO. Ambulates w/ assistance. 3L n/c CPAP at night. SOB with exertion. Distended abdomen. Morning medication given w/ sips of water. Ramirez d/c last night per night shift nurse- awaiting patient to come back & will bladder scan. Fall risk- safety maintained. Will continue to monitor & notify MD of any changes.

## 2023-04-05 NOTE — CASE MANAGEMENT/SOCIAL WORK
Continued Stay Note  NEIL Montero     Patient Name: Norm Tracey  MRN: [id number]  Today's Date: 4/5/2023    Admit Date: 4/4/2023    Plan: Home with spouse   Discharge Plan     Row Name 04/05/23 1004       Plan    Plan Home with spouse    Patient/Family in Agreement with Plan yes    Plan Comments Pt resides with spouse, Roselia.  [facility] and att[facility] the [place] clinic.  He does have [employer] as well, but states all meds/and Dr[facility].  Pt has a walker.  Possible need for home 02 and/or cpap upon dc.  SW will await respiratory qualifers/orders.               Discharge Codes    No documentation.                     TRINY VelascoW

## 2023-04-05 NOTE — CONSULTS
Referring Provider: Dr. Ivis Spain  Reason for Consultation: pericardial effusion    Patient Care Team:  Provider, No Known as PCP - General    Chief complaint shortness of breath     Subjective .     History of present illness: Mr. Tracey is a 72-year-old male with atrial fibrillation on anticoagulation with Eliquis, morbid obesity, osteoarthritis, degenerative disc disease, hypertension, nonalcoholic steatohepatitis, obstructive sleep apnea and recent pneumonia who presents to [facility] overnight as a transfer from [facility] for higher level of care.  He is resting in bed and joined with his family.  History is provided from patient, family, and medical records.  They relay patient is in need of a left hip replacement due to severe arthritis.  He was undergoing work-up for this replacement and was found to have atrial fibrillation.  He was referred to cardiology at [facility], and seen by .  Work-up identified a pericardial effusion and he has been treated for pericarditis.  Yesterday, he was found to be more short of breath and lethargic.  He was brought to [facility] emergency department for evaluation.  He was found to be in respiratory failure with hypercapnia, pH 7.39, PCO2 60, PO2 53.  He was placed on BiPAP.  Echocardiogram showed preserved LV function, a large pericardial effusion with a possible extracardiac mass.  There was no evidence of cardiac tamponade.  Dr. Yip was contacted by the transfer center and ultimately the patient was transferred here for evaluation.  Anticoagulation was advised to be discontinued, but unfortunately patient received a dose of Eliquis on April 4, 2023 at 0911 and April 5, 2023 at 0036.  He is on VTE dosing heparin injections.      History  Code Status and Medical Interventions:   Ordered at: 04/04/23 2200     Code Status (Patient has no pulse and is not breathing):    CPR (Attempt to Resuscitate)     Medical  Interventions (Patient has pulse or is breathing):    Full Support     Past Medical History:   Diagnosis Date   • Atrial fibrillation with rapid ventricular response    • Essential hypertension    • Obesity due to excess calories with serious comorbidity    • Pneumonia    • Respiratory failure    • Sleep apnea    , History reviewed. No pertinent surgical history., History reviewed. No pertinent family history.,   Social History     Tobacco Use   • Smoking status: Former     Years: 40.00     Types: Cigarettes     Quit date: 4/4/2020     Years since quitting: 3.0   • Smokeless tobacco: Never   Vaping Use   • Vaping Use: Never used   Substance Use Topics   • Alcohol use: Not Currently   • Drug use: Never   ,   Medications Prior to Admission   Medication Sig Dispense Refill Last Dose   • amLODIPine (NORVASC) 5 MG tablet Take 1 tablet by mouth Daily.   4/4/2023   • apixaban (ELIQUIS) 5 MG tablet tablet Take 1 tablet by mouth 2 (Two) Times a Day.   4/4/2023   • busPIRone (BUSPAR) 10 MG tablet Take 1 tablet by mouth 2 (Two) Times a Day.   4/4/2023   • colchicine 0.6 MG tablet Take 1 tablet by mouth 2 (Two) Times a Day.   4/4/2023   • fenofibrate (TRICOR) 54 MG tablet Take 1 tablet by mouth Daily.   4/4/2023   • furosemide (LASIX) 40 MG tablet Take 1 tablet by mouth Daily.   4/4/2023   • mirtazapine (REMERON) 15 MG tablet Take 1 tablet by mouth Every Night.   4/3/2023 at 2221   • prazosin (MINIPRESS) 5 MG capsule Take 1 capsule by mouth Every Night.   4/4/2023   • sertraline (ZOLOFT) 100 MG tablet Take 2 tablets by mouth Every Night.   4/3/2023   • sotalol (BETAPACE) 80 MG tablet Take 1 tablet by mouth 2 (Two) Times a Day.   4/4/2023   • Cholecalciferol 25 MCG (1000 UT) tablet Take 1 tablet by mouth Daily.      • docusate sodium (COLACE) 100 MG capsule Take 1 capsule by mouth Daily.      • ferrous sulfate 324 (65 Fe) MG tablet delayed-release EC tablet Take 1 tablet by mouth Daily With Breakfast.      • multivitamin with  "minerals tablet tablet Take 1 tablet by mouth Daily.      • traMADol (ULTRAM) 50 MG tablet Take 1 tablet by mouth 2 (Two) Times a Day As Needed for Moderate Pain.      , Allergies: Patient has no known allergies.    Review of Systems  Review of Systems   Constitutional: Positive for activity change, appetite change and fatigue. Negative for chills, diaphoresis and fever.   HENT: Negative for trouble swallowing and voice change.    Respiratory: Positive for cough and shortness of breath. Negative for wheezing.    Cardiovascular: Positive for palpitations and leg swelling. Negative for chest pain.   Gastrointestinal: Positive for abdominal distention.   Genitourinary: Negative for difficulty urinating.   Musculoskeletal: Positive for arthralgias, back pain and gait problem.   Neurological: Positive for weakness.   Hematological: Bruises/bleeds easily.   Psychiatric/Behavioral: Negative for agitation, behavioral problems and confusion.        Objective     Vital Signs   Visit Vitals  /82 (BP Location: Right arm, Patient Position: Lying)   Pulse 78   Temp 98.4 °F (36.9 °C) (Oral)   Resp 21   Ht 155.4 cm (61.2\")   Wt 125 kg (276 lb 9.6 oz)   SpO2 94%   BMI 51.92 kg/m²       Physical Exam  Vitals reviewed.   Constitutional:       General: He is not in acute distress.     Appearance: He is well-developed. He is morbidly obese. He is not diaphoretic.      Interventions: Nasal cannula in place.   HENT:      Head: Normocephalic and atraumatic.   Eyes:      Pupils: Pupils are equal, round, and reactive to light.   Cardiovascular:      Rate and Rhythm: Normal rate and regular rhythm.      Heart sounds: Normal heart sounds. No murmur heard.    No friction rub.   Pulmonary:      Effort: Pulmonary effort is normal. No respiratory distress.      Breath sounds: Examination of the right-middle field reveals decreased breath sounds. Examination of the left-middle field reveals decreased breath sounds. Examination of the " right-lower field reveals decreased breath sounds. Examination of the left-lower field reveals decreased breath sounds. Decreased breath sounds present. No wheezing or rales.   Abdominal:      General: There is no distension.      Palpations: Abdomen is soft.      Tenderness: There is no abdominal tenderness. There is no guarding.   Musculoskeletal:      Cervical back: Normal range of motion and neck supple.      Right lower leg: Edema present.      Left lower leg: Edema present.   Skin:     General: Skin is warm and dry.      Coloration: Skin is not pale.      Findings: No rash.   Neurological:      Mental Status: He is alert and oriented to person, place, and time.   Psychiatric:         Behavior: Behavior normal.         Sinus 76 bpm   In and out of afib overnight 68-91 bpm     LAB:   CBC:  Results from last 7 days   Lab Units 04/05/23  0530   WBC 10*3/mm3 12.81*   HEMATOCRIT % 42.1   PLATELETS 10*3/mm3 132*          BMP:)  Results from last 7 days   Lab Units 04/05/23  0005   SODIUM mmol/L 145   POTASSIUM mmol/L 3.8   CHLORIDE mmol/L 102   CO2 mmol/L 33.0*   GLUCOSE mg/dL 111*   BUN mg/dL 42*   CREATININE mg/dL 1.40*        IMAGES:       Imaging Results (Last 24 Hours)     Procedure Component Value Units Date/Time    CT Chest Without Contrast Diagnostic [[id number]] Collected: 04/05/23 0913     Updated: 04/05/23 0932    Narrative:      EXAMINATION: CT CHEST WO CONTRAST DIAGNOSTIC-      4/5/2023 8:48 AM CDT     HISTORY: pericardial effusion     In order to have a CT radiation dose as low as reasonably achievable  Automated Exposure Control was utilized for adjustment of the mA and/or  KV according to patient size.     DLP in mGycm= 969     The CT scan of the chest is performed without intravenous contrast  enhancement.     Images are acquired in axial plane and subsequent reconstruction in  coronal and sagittal planes.     There is no previous similar study for comparison.     There is significant respiratory  motion artifacts which is noted  diagnostic quality of the study. This may obscure a subtle  nodule/lesion.     The lungs are poorly expanded.     There is a moderate right basal pleural effusion and a trace left basal  pleural effusion.     There is a right lower lobar consolidation adjacent to the pleural  effusion which may suggest compression atelectasis. Mild atelectatic  changes and pleural thickening is seen at the left base posteriorly.     Visualized central airway is patent. The distal airway is poorly  visualized and evaluated due to respiratory motion artifacts. No  significant endobronchial abnormality or a lesion.     The limited visualized soft tissues of the neck are unremarkable.  Probable low-density nodule in the left lobe of the thyroid gland. There  is no axillary lymphadenopathy.     Atheromatous changes of the thoracic aorta noted. No aneurysmal  dilatation. The pulmonary arteries are of normal size and shape. The  coronary arteries are not optimally visualized or evaluated. No  significant calcification the course of the coronary arteries.     There is a large pericardial effusion which measures up to 3.2 cm in  width.     There is a large Bochdalek hernia with peritoneal fat and infiltration.     The limited visualized unenhanced abdomen show small amount of fluid in  the upper abdomen around the liver and spleen. Exophytic nodule/masses  are seen in the limited visualized kidneys. The gallbladder is small and  contracted. No radiopaque calculi are noted. Pancreas and kidneys are  incompletely included and not evaluated. The abdomen is separately  reviewed and reported with same day CT scan of the abdomen     Images reviewed in bone windows show chronic degenerative changes of the  thoracic spine with a mild levoscoliosis. No acute bony abnormality.  There is accentuated thoracic kyphosis. Hardware fusion of the lower  cervical spine is visualized. No hardware complication. Old  healed  fractures of the left ribs are noted.       Impression:      1. A large pericardial effusion.  2. A moderate right and a trace left pleural effusion.  3. Right lower lobar consolidation/compression atelectasis.                                   This report was finalized on 04/05/2023 09:29 by Dr. Carly Espino MD.    CT Abdomen Pelvis Without Contrast [[id number]] Collected: 04/05/23 0909     Updated: 04/05/23 0922    Narrative:      EXAM/TECHNIQUE: CT abdomen pelvis without contrast     INDICATION: Lymphadenopathy, groin     COMPARISON: None     DLP: 969 mGy cm. Automated exposure control was also utilized to  decrease patient radiation dose.     FINDINGS:     Large pericardial effusion measuring up to 3 cm in thickness. Moderate  RIGHT pleural effusion with atelectasis.     Unenhanced liver appears unremarkable. Gallbladder is decompressed. No  gallstones or biliary ductal dilatation.      Pancreas, spleen, and RIGHT adrenal gland are unremarkable.  Indeterminate 4.0 x 2.3 cm LEFT adrenal gland nodule.      Multiple bilateral low-density renal lesions compatible with cysts. No  renal or ureteral calculi. No hydronephrosis. 1.7 cm calculus layering  in the posterior midline urinary bladder. Small locules of gas in the  urinary bladder lumen are noted. No focal urinary bladder wall  thickening.     Colonic diverticulosis without evidence of diverticulitis. No colonic  wall thickening or pericolonic fat stranding. No abnormal small bowel  distention or evidence of active small bowel inflammation. No  pneumatosis or evidence of pneumoperitoneum.     Small volume ascites throughout the abdomen and pelvis. No pelvic mass  organized pelvic collection. Prostate is enlarged measuring 5 cm  transverse dimension. Nonaneurysmal atherosclerotic abdominal aorta. No  enlarged abdominal or pelvic lymph nodes. Moderate to large right-sided  hydrocele.     Diffuse body wall soft tissue edema. Severe osteoarthritis in the  LEFT  hip. Grade 1 anterolisthesis of L4 on L5.       Impression:         1.  Large pericardial effusion. Close clinical follow-up is recommended.  2.  Moderate RIGHT pleural effusion with overlying atelectasis.  3.  Small volume ascites throughout the abdomen and pelvis.  4.  No abdominal or pelvic lymphadenopathy.  5.  Moderate to large right-sided hydrocele.  6.  1.7 cm calculus in the urinary bladder.  7.  Indeterminate 4 cm LEFT adrenal gland nodule. Differential includes  adrenal gland mass as well as focal adrenal gland hemorrhage. Recommend  follow-up adrenal protocol CT or MR.  8.  Enlarged prostate.  This report was finalized on 04/05/2023 09:19 by Dr. Jacoby Oscar MD.                       Assessment & Plan            Pericardial effusion  BRYAN  Morbid obesity  Bilateral pleural effusions  Obstructive sleep apnea  Acute on chronic respiratory failure  Paroxysmal atrial fibrillation  Acquired coagulation factor deficiency  Elevated creatinine, unsure of baseline        I discussed the patient's findings and my recommendations with patient and family.  Will review echocardiogram with Dr. Yip.  CT scan of the chest noncontrast has been ordered.  Again, hold therapeutic dosing anticoagulation. Okay for vte heparin.   Patient needs to remain n.p.o. for possible pericardial window.  Briefly reviewed the risks of the procedure as well as the benefits and alternatives. Answered all questions the best of my ability.      Follow-up this afternoon with Dr. Yip  Management of respiratory failure per hospitalist services  Management of atrial fibrillation per cardiology services      MARGUERITE [place]  04/05/23  09:47 CDT

## 2023-04-05 NOTE — H&P
[facility] Medicine Services  HISTORY AND PHYSICAL    Date of Admission: 4/4/2023  Primary Care Physician: Provider, No Known    Subjective   Primary Historian: Patient      Chief Complaint: Shortness of breath      History of Present Illness  Patient is a 72-year-old male with medical history of morbid obesity, A-fib on sotalol and Eliquis, FELIX on CPAP at night, hypertension, presents to [facility] with complaints of worsening shortness of breath.  Patient reports that he was recently diagnosed with FELIX and prescribed CPAP machine at home which he has been compliant with.  He also reported being diagnosed with pericardial fusion in March where he was managed at [facility] for A-fib RVR and pericardial effusion.  He was started on colchicine at that time, and lasix and discharged home.  He represented at [facility] with worsening shortness of breath noted to be in A-fib RVR as well.  Patient was started on Cardizem drip, placed on BiPAP for airway support, CT chest reported large pericardial effusion with bilateral pleural effusions but no sign of tamponade.  Patient was discussed with CT surgery Dr. Yip according to chart review and patient was accepted for transfer for CT surgery evaluation and management.  Patient seen on arrival to this facility on oxygen supplementation, denies chest pain or any other symptoms or complaints.  Patient admitted for medical care.        Review of Systems   Otherwise complete ROS reviewed and negative except as mentioned in the HPI.    Past Medical History: Morbid obesity, A-fib, FELIX on CPAP, hypertension, pericardial fusion, pleural effusion,  Past Surgical History:History reviewed. No pertinent surgical history.  Social History:  reports that he quit smoking about 3 years ago. His smoking use included cigarettes. He has never used smokeless tobacco. He reports that he does not currently use  "alcohol. He reports that he does not use drugs.    Family History: CAD, hypertension,    Allergies:  No Known Allergies    Medications:  Prior to Admission medications    Not on File     I have utilized all available immediate resources to obtain, update, or review the patient's current medications (including all prescriptions, over-the-counter products, herbals, cannabis/cannabidiol products, and vitamin/mineral/dietary (nutritional) supplements).    Objective     Vital Signs: /72 (BP Location: Right arm, Patient Position: Lying)   Pulse 81   Temp 97.4 °F (36.3 °C)   Resp 18   Ht 155.4 cm (61.2\")   Wt 125 kg (276 lb 9.6 oz)   SpO2 93%   BMI 51.92 kg/m²   Physical Exam:   Temp:  [97.4 °F (36.3 °C)-97.6 °F (36.4 °C)] 97.6 °F (36.4 °C)  Heart Rate:  [81-83] 81  Resp:  [18-20] 20  BP: ()/(70-72) 96/70  Physical Exam    General: Morbidly obese NC/AT, appears stated age, alert and oriented x3  HEENT: PERRLA, EOMI, no scleral icterus, no conjunctival injection,   NECK:  Neck is supple, no JVD, no supraclavicular lymphadenopathy  CV: S1 S2 irregularly irregular  no murmurs, no gallops, no heaves, pulses palpable.  LUNG: Diminished breath sounds lower lung fields with poor air movement.  ABD: Large abdominal girth, obese, nondistended, nontender, difficult to auscultate due to body habitus, no guarding or rebound.  EXT: FROM, strength is intact, 2+ pitting edema, no joint effusion, no calf tenderness.  Pulses palpable  Neuro: CN 2-12, grossly intact,no  focal weakness appreciated  Skin: Warm and dry, no rash or lesions.      Results Reviewed:  Lab work from transferring facility.  Chemistry: Sodium 144, potassium 3.8, chloride 102, CO2 32.5, BUN 43, creatinine 1.55, total protein 6.9, albumin 3.5, total bilirubin 0.51, alk phos 69, AST 19, ALT 44, calcium 8.2,      Imaging Results (Last 24 Hours)     ** No results found for the last 24 hours. **        I have personally reviewed and interpreted the " radiology studies and ECG obtained at time of admission.     Assessment / Plan   Assessment and plan:   72-year-old male with medical history of morbid obesity, A-fib on sotalol and Eliquis, FELIX, hypertension, presenting with worsening pleural effusion and pericardial effusion with no tamponade.  Patient is also respiratory distress requiring oxygen supplementation.  Patient also in A-fib RVR due to respiratory distress. He is admitted for further medical care.        Problem list:  1.  Acute on chronic respiratory failure  2.  A-fib RVR  3.  Bilateral pleural effusion  4.  Pericardial effusion without tamponade  5.  Fluid overload  6.  Acute renal failure        Treatment Plan  The patient will be admitted to my service here at [facility].  -Admit to inpatient  - Oxygen supplementation, CPAP/BiPAP as needed  - Telemetry monitoring  - IV Lopressor and Cardizem for A-fib RVR, consider Cardizem drip if difficult to control  - Aggressive IV diuresing Lasix 60 mg twice a day  - Breathing treatments with DuoNebs  - Patient had recent echocardiogram, defer repeating echo to cardiology.  Patient may require LUCAS instead.  - Hold Eliquis anticoagulation for possible intervention by CT surgery.  - Consult CT surgery in the a.m.  - Consider CT abdomen/pelvis for work-up of his volume overload, Per echo reportedly patient has no history of heart failure as EF is 55 to 65% with mild LVH.    Reviewed the rest of his home medications, restart those appropriate for management of his other comorbid conditions.      Patient is a full code  Time spent in admission greater than 30 minutes.  Anticipate length of stay greater than 2 nights.      Medical Decision Making  As discussed above      Conditions and Status  Patient is clinically stable       MDM Data  External documents reviewed: As discussed above  Cardiac tracing (EKG, telemetry) interpretation: As discussed above  Radiology interpretation: As discussed above  Labs  reviewed: As discussed above         Care Planning  Patient is a full code    Disposition  Patient can discharge home after resolution of acute condition and returned to baseline.  No  needed identified at this time.            Electronically signed by Vinay Noland MD, 04/04/23, 21:41 CDT.

## 2023-04-06 LAB
ALBUMIN SERPL-MCNC: 4.2 G/DL (ref 3.5–5.2)
ALBUMIN/GLOB SERPL: 1.7 G/DL
ALP SERPL-CCNC: 67 U/L (ref 39–117)
ALT SERPL W P-5'-P-CCNC: 31 U/L (ref 1–41)
ANION GAP SERPL CALCULATED.3IONS-SCNC: 8 MMOL/L (ref 5–15)
ARTERIAL PATENCY WRIST A: POSITIVE
ARTERIAL PATENCY WRIST A: POSITIVE
AST SERPL-CCNC: 17 U/L (ref 1–40)
ATMOSPHERIC PRESS: 759 MMHG
ATMOSPHERIC PRESS: 759 MMHG
BASE EXCESS BLDA CALC-SCNC: 6.1 MMOL/L (ref 0–2)
BASE EXCESS BLDA CALC-SCNC: 6.5 MMOL/L (ref 0–2)
BASOPHILS # BLD AUTO: 0.03 10*3/MM3 (ref 0–0.2)
BASOPHILS NFR BLD AUTO: 0.3 % (ref 0–1.5)
BDY SITE: ABNORMAL
BDY SITE: ABNORMAL
BILIRUB SERPL-MCNC: 0.5 MG/DL (ref 0–1.2)
BODY TEMPERATURE: 37 C
BODY TEMPERATURE: 37 C
BUN SERPL-MCNC: 47 MG/DL (ref 8–23)
BUN/CREAT SERPL: 37 (ref 7–25)
CALCIUM SPEC-SCNC: 8.9 MG/DL (ref 8.6–10.5)
CHLORIDE SERPL-SCNC: 99 MMOL/L (ref 98–107)
CO2 SERPL-SCNC: 36 MMOL/L (ref 22–29)
CREAT SERPL-MCNC: 1.27 MG/DL (ref 0.76–1.27)
DEPRECATED RDW RBC AUTO: 51.8 FL (ref 37–54)
EGFRCR SERPLBLD CKD-EPI 2021: 59.7 ML/MIN/1.73
EOSINOPHIL # BLD AUTO: 0.2 10*3/MM3 (ref 0–0.4)
EOSINOPHIL NFR BLD AUTO: 1.7 % (ref 0.3–6.2)
EPAP: 10
ERYTHROCYTE [DISTWIDTH] IN BLOOD BY AUTOMATED COUNT: 14.6 % (ref 12.3–15.4)
GAS FLOW AIRWAY: 3 LPM
GLOBULIN UR ELPH-MCNC: 2.5 GM/DL
GLUCOSE SERPL-MCNC: 186 MG/DL (ref 65–99)
HCO3 BLDA-SCNC: 34.8 MMOL/L (ref 20–26)
HCO3 BLDA-SCNC: 34.9 MMOL/L (ref 20–26)
HCT VFR BLD AUTO: 44.6 % (ref 37.5–51)
HGB BLD-MCNC: 13.4 G/DL (ref 13–17.7)
IMM GRANULOCYTES # BLD AUTO: 0.08 10*3/MM3 (ref 0–0.05)
IMM GRANULOCYTES NFR BLD AUTO: 0.7 % (ref 0–0.5)
INHALED O2 CONCENTRATION: 30 %
IPAP: 16
LYMPHOCYTES # BLD AUTO: 0.88 10*3/MM3 (ref 0.7–3.1)
LYMPHOCYTES NFR BLD AUTO: 7.7 % (ref 19.6–45.3)
Lab: 8368
Lab: ABNORMAL
MCH RBC QN AUTO: 29.1 PG (ref 26.6–33)
MCHC RBC AUTO-ENTMCNC: 30 G/DL (ref 31.5–35.7)
MCV RBC AUTO: 97 FL (ref 79–97)
MODALITY: ABNORMAL
MODALITY: ABNORMAL
MONOCYTES # BLD AUTO: 0.77 10*3/MM3 (ref 0.1–0.9)
MONOCYTES NFR BLD AUTO: 6.7 % (ref 5–12)
NEUTROPHILS NFR BLD AUTO: 82.9 % (ref 42.7–76)
NEUTROPHILS NFR BLD AUTO: 9.51 10*3/MM3 (ref 1.7–7)
NOTIFIED BY: ABNORMAL
NOTIFIED BY: ABNORMAL
NOTIFIED WHO: ABNORMAL
NOTIFIED WHO: ABNORMAL
NRBC BLD AUTO-RTO: 0 /100 WBC (ref 0–0.2)
PCO2 BLDA: 69.2 MM HG (ref 35–45)
PCO2 BLDA: 71.2 MM HG (ref 35–45)
PCO2 TEMP ADJ BLD: 69.2 MM HG (ref 35–45)
PCO2 TEMP ADJ BLD: 71.2 MM HG (ref 35–45)
PH BLDA: 7.3 PH UNITS (ref 7.35–7.45)
PH BLDA: 7.31 PH UNITS (ref 7.35–7.45)
PH, TEMP CORRECTED: 7.3 PH UNITS (ref 7.35–7.45)
PH, TEMP CORRECTED: 7.31 PH UNITS (ref 7.35–7.45)
PLATELET # BLD AUTO: 166 10*3/MM3 (ref 140–450)
PMV BLD AUTO: 11.7 FL (ref 6–12)
PO2 BLDA: 79.8 MM HG (ref 83–108)
PO2 BLDA: 79.9 MM HG (ref 83–108)
PO2 TEMP ADJ BLD: 79.8 MM HG (ref 83–108)
PO2 TEMP ADJ BLD: 79.9 MM HG (ref 83–108)
POTASSIUM SERPL-SCNC: 4.1 MMOL/L (ref 3.5–5.2)
PROT SERPL-MCNC: 6.7 G/DL (ref 6–8.5)
RBC # BLD AUTO: 4.6 10*6/MM3 (ref 4.14–5.8)
SAO2 % BLDCOA: 95.8 % (ref 94–99)
SAO2 % BLDCOA: 96.3 % (ref 94–99)
SET MECH RESP RATE: 12
SODIUM SERPL-SCNC: 143 MMOL/L (ref 136–145)
VENTILATOR MODE: ABNORMAL
VENTILATOR MODE: ABNORMAL
WBC NRBC COR # BLD: 11.47 10*3/MM3 (ref 3.4–10.8)

## 2023-04-06 PROCEDURE — 85025 COMPLETE CBC W/AUTO DIFF WBC: CPT | Performed by: INTERNAL MEDICINE

## 2023-04-06 PROCEDURE — 36600 WITHDRAWAL OF ARTERIAL BLOOD: CPT

## 2023-04-06 PROCEDURE — 80053 COMPREHEN METABOLIC PANEL: CPT | Performed by: FAMILY MEDICINE

## 2023-04-06 PROCEDURE — 94799 UNLISTED PULMONARY SVC/PX: CPT

## 2023-04-06 PROCEDURE — 99233 SBSQ HOSP IP/OBS HIGH 50: CPT | Performed by: INTERNAL MEDICINE

## 2023-04-06 PROCEDURE — 25010000002 HEPARIN (PORCINE) PER 1000 UNITS: Performed by: INTERNAL MEDICINE

## 2023-04-06 PROCEDURE — 82803 BLOOD GASES ANY COMBINATION: CPT

## 2023-04-06 RX ORDER — SODIUM CHLORIDE 9 MG/ML
75 INJECTION, SOLUTION INTRAVENOUS CONTINUOUS
Status: DISCONTINUED | OUTPATIENT
Start: 2023-04-06 | End: 2023-04-09

## 2023-04-06 RX ADMIN — BUSPIRONE HYDROCHLORIDE 10 MG: 10 TABLET ORAL at 20:42

## 2023-04-06 RX ADMIN — SODIUM CHLORIDE 75 ML/HR: 9 INJECTION, SOLUTION INTRAVENOUS at 09:08

## 2023-04-06 RX ADMIN — HEPARIN SODIUM 5000 UNITS: 5000 INJECTION INTRAVENOUS; SUBCUTANEOUS at 05:09

## 2023-04-06 RX ADMIN — COLCHICINE 0.6 MG: 0.6 TABLET ORAL at 20:42

## 2023-04-06 RX ADMIN — SOTALOL HYDROCHLORIDE 80 MG: 80 TABLET ORAL at 20:42

## 2023-04-06 RX ADMIN — HEPARIN SODIUM 5000 UNITS: 5000 INJECTION INTRAVENOUS; SUBCUTANEOUS at 22:32

## 2023-04-06 RX ADMIN — HEPARIN SODIUM 5000 UNITS: 5000 INJECTION INTRAVENOUS; SUBCUTANEOUS at 15:32

## 2023-04-06 RX ADMIN — COLCHICINE 0.6 MG: 0.6 TABLET ORAL at 09:05

## 2023-04-06 RX ADMIN — MIRTAZAPINE 15 MG: 15 TABLET, FILM COATED ORAL at 20:42

## 2023-04-06 RX ADMIN — SOTALOL HYDROCHLORIDE 80 MG: 80 TABLET ORAL at 09:05

## 2023-04-06 RX ADMIN — BUSPIRONE HYDROCHLORIDE 10 MG: 10 TABLET ORAL at 09:05

## 2023-04-06 NOTE — PROGRESS NOTES
LOS: 2 days   Patient Care Team:  Provider, No Known as PCP - General    Chief Complaint: Shortness of breath, large pericardial effusion     Ez Tracey is a 72 y.o. male who is being seen in follow-up  Overnight no new issues or events  Blood pressures are satisfactory  No chest pain  No palpitation  No presyncope  No syncope  No orthopnea  No paroxysmal nocturnal dyspnea  Hemodynamically stable  Results of echocardiogram as referenced below  Maintaining sinus rhythm  Sitting up on bed without any particular current issues  Has mild chronic shortness of breath    Telemetry: no malignant arrhythmia. No significant pauses.  In atrial flutter with rates between  bpm currently when I saw him was 88 bpm    Review of Systems   Constitutional: No chills   Has fatigue   No fever.   HENT: Negative.    Eyes: Negative.    Respiratory: Negative for cough,   No chest wall soreness,   Shortness of breath,   no wheezing, no stridor.    Cardiovascular: As above  Gastrointestinal: Negative for abdominal distention,  No abdominal pain,   No blood in stool,   No constipation,   No diarrhea,   No nausea   No vomiting.   Endocrine: Negative.    Genitourinary: Negative for difficulty urinating, dysuria, flank pain and hematuria.   Musculoskeletal: Negative.    Skin: Negative for rash and wound.   Allergic/Immunologic: Negative.    Neurological: Negative for dizziness, syncope, weakness,   No light-headedness  No  headaches.   Hematological: Does not bruise/bleed easily.   Psychiatric/Behavioral: Negative for agitation or behavioral problems,   No confusion,   the patient is  nervous/anxious.       History:   Past Medical History:   Diagnosis Date   • Atrial fibrillation with rapid ventricular response    • Essential hypertension    • Obesity due to excess calories with serious comorbidity    • Pneumonia    • Respiratory failure    • Sleep apnea      History reviewed. No pertinent surgical history.  Social History      Socioeconomic History   • Marital status:    Tobacco Use   • Smoking status: Former     Years: 40.00     Types: Cigarettes     Quit date: 4/4/2020     Years since quitting: 3.0   • Smokeless tobacco: Never   Vaping Use   • Vaping Use: Never used   Substance and Sexual Activity   • Alcohol use: Not Currently   • Drug use: Never     History reviewed. No pertinent family history.    Labs:  WBC WBC   Date Value Ref Range Status   04/05/2023 12.81 (H) 3.40 - 10.80 10*3/mm3 Final      HGB Hemoglobin   Date Value Ref Range Status   04/05/2023 12.7 (L) 13.0 - 17.7 g/dL Final      HCT Hematocrit   Date Value Ref Range Status   04/05/2023 42.1 37.5 - 51.0 % Final      Platelets Platelets   Date Value Ref Range Status   04/05/2023 132 (L) 140 - 450 10*3/mm3 Final      MCV MCV   Date Value Ref Range Status   04/05/2023 95.2 79.0 - 97.0 fL Final        Results from last 7 days   Lab Units 04/05/23  0005   SODIUM mmol/L 145   POTASSIUM mmol/L 3.8   CHLORIDE mmol/L 102   CO2 mmol/L 33.0*   BUN mg/dL 42*   CREATININE mg/dL 1.40*   CALCIUM mg/dL 9.0   GLUCOSE mg/dL 111*   No results found for: CKTOTAL, CKMB, CKMBINDEX, TROPONINI, TROPONINT  PT/INR:  No results found for: PROTIME/No results found for: INR    Imaging Results (Last 72 Hours)     Procedure Component Value Units Date/Time    CT Chest Without Contrast Diagnostic [[id number]] Collected: 04/05/23 0913     Updated: 04/05/23 0932    Narrative:      EXAMINATION: CT CHEST WO CONTRAST DIAGNOSTIC-      4/5/2023 8:48 AM CDT     HISTORY: pericardial effusion     In order to have a CT radiation dose as low as reasonably achievable  Automated Exposure Control was utilized for adjustment of the mA and/or  KV according to patient size.     DLP in mGycm= 969     The CT scan of the chest is performed without intravenous contrast  enhancement.     Images are acquired in axial plane and subsequent reconstruction in  coronal and sagittal planes.     There is no previous similar  study for comparison.     There is significant respiratory motion artifacts which is noted  diagnostic quality of the study. This may obscure a subtle  nodule/lesion.     The lungs are poorly expanded.     There is a moderate right basal pleural effusion and a trace left basal  pleural effusion.     There is a right lower lobar consolidation adjacent to the pleural  effusion which may suggest compression atelectasis. Mild atelectatic  changes and pleural thickening is seen at the left base posteriorly.     Visualized central airway is patent. The distal airway is poorly  visualized and evaluated due to respiratory motion artifacts. No  significant endobronchial abnormality or a lesion.     The limited visualized soft tissues of the neck are unremarkable.  Probable low-density nodule in the left lobe of the thyroid gland. There  is no axillary lymphadenopathy.     Atheromatous changes of the thoracic aorta noted. No aneurysmal  dilatation. The pulmonary arteries are of normal size and shape. The  coronary arteries are not optimally visualized or evaluated. No  significant calcification the course of the coronary arteries.     There is a large pericardial effusion which measures up to 3.2 cm in  width.     There is a large Bochdalek hernia with peritoneal fat and infiltration.     The limited visualized unenhanced abdomen show small amount of fluid in  the upper abdomen around the liver and spleen. Exophytic nodule/masses  are seen in the limited visualized kidneys. The gallbladder is small and  contracted. No radiopaque calculi are noted. Pancreas and kidneys are  incompletely included and not evaluated. The abdomen is separately  reviewed and reported with same day CT scan of the abdomen     Images reviewed in bone windows show chronic degenerative changes of the  thoracic spine with a mild levoscoliosis. No acute bony abnormality.  There is accentuated thoracic kyphosis. Hardware fusion of the lower  cervical spine  is visualized. No hardware complication. Old healed  fractures of the left ribs are noted.       Impression:      1. A large pericardial effusion.  2. A moderate right and a trace left pleural effusion.  3. Right lower lobar consolidation/compression atelectasis.                                   This report was finalized on 04/05/2023 09:29 by Dr. Carly Espino MD.    CT Abdomen Pelvis Without Contrast [[id number]] Collected: 04/05/23 0909     Updated: 04/05/23 0922    Narrative:      EXAM/TECHNIQUE: CT abdomen pelvis without contrast     INDICATION: Lymphadenopathy, groin     COMPARISON: None     DLP: 969 mGy cm. Automated exposure control was also utilized to  decrease patient radiation dose.     FINDINGS:     Large pericardial effusion measuring up to 3 cm in thickness. Moderate  RIGHT pleural effusion with atelectasis.     Unenhanced liver appears unremarkable. Gallbladder is decompressed. No  gallstones or biliary ductal dilatation.      Pancreas, spleen, and RIGHT adrenal gland are unremarkable.  Indeterminate 4.0 x 2.3 cm LEFT adrenal gland nodule.      Multiple bilateral low-density renal lesions compatible with cysts. No  renal or ureteral calculi. No hydronephrosis. 1.7 cm calculus layering  in the posterior midline urinary bladder. Small locules of gas in the  urinary bladder lumen are noted. No focal urinary bladder wall  thickening.     Colonic diverticulosis without evidence of diverticulitis. No colonic  wall thickening or pericolonic fat stranding. No abnormal small bowel  distention or evidence of active small bowel inflammation. No  pneumatosis or evidence of pneumoperitoneum.     Small volume ascites throughout the abdomen and pelvis. No pelvic mass  organized pelvic collection. Prostate is enlarged measuring 5 cm  transverse dimension. Nonaneurysmal atherosclerotic abdominal aorta. No  enlarged abdominal or pelvic lymph nodes. Moderate to large right-sided  hydrocele.     Diffuse body wall soft  "tissue edema. Severe osteoarthritis in the LEFT  hip. Grade 1 anterolisthesis of L4 on L5.       Impression:         1.  Large pericardial effusion. Close clinical follow-up is recommended.  2.  Moderate RIGHT pleural effusion with overlying atelectasis.  3.  Small volume ascites throughout the abdomen and pelvis.  4.  No abdominal or pelvic lymphadenopathy.  5.  Moderate to large right-sided hydrocele.  6.  1.7 cm calculus in the urinary bladder.  7.  Indeterminate 4 cm LEFT adrenal gland nodule. Differential includes  adrenal gland mass as well as focal adrenal gland hemorrhage. Recommend  follow-up adrenal protocol CT or MR.  8.  Enlarged prostate.  This report was finalized on 04/05/2023 09:19 by Dr. Jacoby Oscar MD.          Objective     No Known Allergies    Medication Review: Performed  Current Facility-Administered Medications   Medication Dose Route Frequency Provider Last Rate Last Admin   • busPIRone (BUSPAR) tablet 10 mg  10 mg Oral BID Vinay Noland MD   10 mg at 04/05/23 2106   • colchicine tablet 0.6 mg  0.6 mg Oral BID Vinay Noland MD   0.6 mg at 04/05/23 2106   • heparin (porcine) 5000 UNIT/ML injection 5,000 Units  5,000 Units Subcutaneous Q8H Vinay Noland MD   5,000 Units at 04/06/23 0509   • mirtazapine (REMERON) tablet 15 mg  15 mg Oral Nightly Vinay Noland MD   15 mg at 04/05/23 2106   • sotalol (BETAPACE) tablet 80 mg  80 mg Oral BID Vinay Noland MD   80 mg at 04/05/23 2106   • traMADol (ULTRAM) tablet 50 mg  50 mg Oral BID PRN Vinay Noland MD           Vital Sign Min/Max for last 24 hours  Temp  Min: 97.5 °F (36.4 °C)  Max: 98.7 °F (37.1 °C)   BP  Min: 90/57  Max: 142/88   Pulse  Min: 63  Max: 100   Resp  Min: 18  Max: 21   SpO2  Min: 92 %  Max: 99 %   No data recorded   Weight  Min: 125 kg (274 lb 12.8 oz)  Max: 125 kg (276 lb)     Flowsheet Rows    Flowsheet Row First Filed Value   Admission Height 155.4 cm (61.2\") Documented at 04/04/2023 2100 "   Admission Weight 125 kg (276 lb 9.6 oz) Documented at 04/04/2023 2100          Results for orders placed during the hospital encounter of 04/04/23    Adult Transthoracic Echo Complete W/ Cont if Necessary Per Protocol    Interpretation Summary  •  Left ventricular ejection fraction appears to be 56 - 60%.  •  Left ventricular diastolic function was indeterminate.  •  Left atrial volume is mildly increased.  •  Estimated right ventricular systolic pressure from tricuspid regurgitation is normal (<35 mmHg).  •  There is a large (>2cm) pericardial effusion.  •  Unclear if tamponade. No detailed interrogation performed. No clearcut RV or RA compression      Physical Exam:    General Appearance: Awake, alert, in no acute distress  Eyes: Pupils equal and reactive    Ears: Appear intact with no abnormalities noted  Nose: Nares normal, no drainage  Neck: supple, trachea midline, no carotid bruit and no JVD  Back: no kyphosis present,    Lungs: respirations regular, respirations even and respirations unlabored  Heart: normal S1, S2, irregular, no obvious murmurs or rub appreciated  Abdomen: normal bowel sounds, no tenderness   Skin: no bleeding, bruising or rash  Extremities: no cyanosis  Psychiatric/Behavioral: Negative for agitation, behavioral problems, confusion, the patient does  appear to be nervous/anxious.       Results Review:   I reviewed the patient's new clinical results.  I reviewed the patient's new imaging results and agree with the interpretation.  I reviewed the patient's other test results and agree with the interpretation  I personally viewed and interpreted the patient's EKG/Telemetry data    Discussed with patient  Updated patient regarding any new or relevant abnormalities on review of records or any new findings on physical exam.   Mentioned to patient about purpose of visit and desirable health short and long term goals and objectives.     Reviewed available prior notes, consults, prior visits,  laboratory findings, radiology and cardiology relevant reports.   Updated chart as applicable.   I have reviewed the patient's medical history in detail and updated the computerized patient record as relevant.          Assessment & Plan       Pericardial effusion    Essential hypertension    Obstructive sleep apnea    Obesity, morbid, BMI 50 or higher    Paroxysmal atrial fibrillation    Stage 3a chronic kidney disease      Plan    N.p.o. after midnight  Discontinue heparin subcutaneously after midnight  Normal saline at 75 cc/h  No diuretic therapy advised  Plans for pericardiocentesis tomorrow  Will give signout to Dr. Holder from cardiology and Dr. Khan likely will perform this procedure was backup for Dr. Holder tomorrow  Discussed with Dr. Yip who also advised pericardiocentesis  He wanted to defer plans for pericardial window  Telemetry  Deep vein thrombosis prophylaxis/precautions  Appropriate diet, fluid, sodium, caffeine, stimulants intake   Questions were encouraged, asked and answered to the patient's  understanding and satisfaction.  Compliance to diet and medications       Shayne Eid MD  04/06/23  08:09 CDT    EMR Dragon/Transcription was used to dictate part of this note

## 2023-04-06 NOTE — PROGRESS NOTES
[facility] Medicine Services  INPATIENT PROGRESS NOTE    Patient Name: Norm Tracey  Date of Admission: 4/4/2023  Today's Date: 04/06/23  Length of Stay: 2  Primary Care Physician: Provider, No Known    Subjective   Chief Complaint: Follow-up shortness of breath  HPI   Mr. Tracey transferred from [facility] 4/4/23 with worsening shortness of breath, atrial fibrillation with RVR on sotalol and Eliquis, and CT scan showing large pericardial effusion.  Patient treated for pericarditis as outpatient by Dr. Soria and was originally referred to him for new onset atrial fibrillation and found to have pericardial effusion.  He was started on colchicine and Lasix.  Pericardial effusion has worsened over the last few weeks associate with increasing shortness of breath.  Patient has a history of morbid obesity, hypertension, Combs, new diagnosis of obstructive sleep apnea.  Etiology of pericardial effusion unknown.  Patient undergoing work-up for hip surgery and found to have pericardial effusion and atrial fibrillation during work-up.  He represented to [facility] with worsening shortness of breath noted to be in A-fib with RVR.  He was started on a Cardizem drip and placed on BiPAP.  CT scan of the chest reported large pericardial effusion with bilateral pleural effusions but no tamponade.  Discussed with CT surgery Dr. Yip accepted patient in transfer.    Patient sitting up in bed.  Oxygen at 2 L with saturation 97%.  Wife present in room.  Cardiology and CTS following with plans for cardiology to proceed with pericardiocentesis tomorrow.  Dr. Yip reviewed echocardiogram and does not appreciate extracardiac mass anterior to the RV.  Given ascites Dr. Yip does not want to create a pericardial window as if he has worsening portal hypertension and ascites this could cause worsening pericardial effusion.  Wife indicates recently ordered for BiPAP but machine not  delivered.   working on determining if BiPAP already ordered previously.  No complaints of chest pain, palpitations.  He denies nausea, vomiting or abdominal pain.  He denies fever or chills.  Patient is followed by Dr. Green at [facility].    Review of Systems   Constitutional: Positive for activity change. Negative for chills, fatigue and fever.   HENT: Negative for congestion and trouble swallowing.    Eyes: Negative for photophobia and visual disturbance.   Respiratory: Positive for shortness of breath. Negative for wheezing.    Cardiovascular: Negative for chest pain and palpitations.   Gastrointestinal: Positive for abdominal distention. Negative for constipation, diarrhea, nausea and vomiting.   Endocrine: Negative for cold intolerance, heat intolerance and polyuria.   Genitourinary: Negative for dysuria, frequency and urgency.   Musculoskeletal: Negative for back pain.   Skin: Negative for color change, pallor, rash and wound.   Allergic/Immunologic: Negative for immunocompromised state.   Neurological: Positive for weakness. Negative for light-headedness.   Hematological: Negative for adenopathy. Does not bruise/bleed easily.   Psychiatric/Behavioral: Negative for agitation, behavioral problems and confusion.      All pertinent negatives and positives are as above. All other systems have been reviewed and are negative unless otherwise stated.     Objective    Temp:  [97.5 °F (36.4 °C)-98.7 °F (37.1 °C)] 98.4 °F (36.9 °C)  Heart Rate:  [] 65  Resp:  [18-20] 20  BP: ()/(53-88) 101/65  Physical Exam  Vitals and nursing note reviewed.   Constitutional:       Appearance: He is obese.      Comments: Eating up in bed.  Oxygen at 3 L.  Saturation 96%.  Wife in room.   HENT:      Head: Normocephalic and atraumatic.      Nose: No congestion.      Mouth/Throat:      Pharynx: Oropharynx is clear.   Cardiovascular:      Rate and Rhythm: Normal rate and regular rhythm.      Heart sounds: No  murmur heard.     Comments: Normal sinus rhythm 86 on telemetry.  Intermittent atrial flutter .  Pulmonary:      Breath sounds: No wheezing, rhonchi or rales.      Comments: Oxygen at 3 L.  Saturation 96%.  Abdominal:      General: There is distension.      Palpations: Abdomen is soft.   Genitourinary:     Comments: Voiding.  Musculoskeletal:         General: Swelling (Feet bilaterally) present.   Skin:     General: Skin is warm and dry.   Neurological:      General: No focal deficit present.      Mental Status: He is alert and oriented to person, place, and time.   Psychiatric:         Mood and Affect: Mood normal.         Behavior: Behavior normal.         Thought Content: Thought content normal.         Judgment: Judgment normal.       Results Review:  I have reviewed the labs, radiology results, and diagnostic studies.    Laboratory Data:   Results from last 7 days   Lab Units 04/06/23  0834 04/05/23  0530   WBC 10*3/mm3 11.47* 12.81*   HEMOGLOBIN g/dL 13.4 12.7*   HEMATOCRIT % 44.6 42.1   PLATELETS 10*3/mm3 166 132*      Results from last 7 days   Lab Units 04/06/23  0954 04/05/23  0005   SODIUM mmol/L 143 145   POTASSIUM mmol/L 4.1 3.8   CHLORIDE mmol/L 99 102   CO2 mmol/L 36.0* 33.0*   BUN mg/dL 47* 42*   CREATININE mg/dL 1.27 1.40*   CALCIUM mg/dL 8.9 9.0   BILIRUBIN mg/dL 0.5  --    ALK PHOS U/L 67  --    ALT (SGPT) U/L 31  --    AST (SGOT) U/L 17  --    GLUCOSE mg/dL 186* 111*     Imaging Results (All)     Procedure Component Value Units Date/Time    CT Chest Without Contrast Diagnostic [[id number]] Collected: 04/05/23 0913     Updated: 04/05/23 0932    Narrative:      EXAMINATION: CT CHEST WO CONTRAST DIAGNOSTIC-      4/5/2023 8:48 AM CDT     HISTORY: pericardial effusion     In order to have a CT radiation dose as low as reasonably achievable  Automated Exposure Control was utilized for adjustment of the mA and/or  KV according to patient size.     DLP in mGycm= 969     The CT scan of the chest is  performed without intravenous contrast  enhancement.     Images are acquired in axial plane and subsequent reconstruction in  coronal and sagittal planes.     There is no previous similar study for comparison.     There is significant respiratory motion artifacts which is noted  diagnostic quality of the study. This may obscure a subtle  nodule/lesion.     The lungs are poorly expanded.     There is a moderate right basal pleural effusion and a trace left basal  pleural effusion.     There is a right lower lobar consolidation adjacent to the pleural  effusion which may suggest compression atelectasis. Mild atelectatic  changes and pleural thickening is seen at the left base posteriorly.     Visualized central airway is patent. The distal airway is poorly  visualized and evaluated due to respiratory motion artifacts. No  significant endobronchial abnormality or a lesion.     The limited visualized soft tissues of the neck are unremarkable.  Probable low-density nodule in the left lobe of the thyroid gland. There  is no axillary lymphadenopathy.     Atheromatous changes of the thoracic aorta noted. No aneurysmal  dilatation. The pulmonary arteries are of normal size and shape. The  coronary arteries are not optimally visualized or evaluated. No  significant calcification the course of the coronary arteries.     There is a large pericardial effusion which measures up to 3.2 cm in  width.     There is a large Bochdalek hernia with peritoneal fat and infiltration.     The limited visualized unenhanced abdomen show small amount of fluid in  the upper abdomen around the liver and spleen. Exophytic nodule/masses  are seen in the limited visualized kidneys. The gallbladder is small and  contracted. No radiopaque calculi are noted. Pancreas and kidneys are  incompletely included and not evaluated. The abdomen is separately  reviewed and reported with same day CT scan of the abdomen     Images reviewed in bone windows show  chronic degenerative changes of the  thoracic spine with a mild levoscoliosis. No acute bony abnormality.  There is accentuated thoracic kyphosis. Hardware fusion of the lower  cervical spine is visualized. No hardware complication. Old healed  fractures of the left ribs are noted.       Impression:      1. A large pericardial effusion.  2. A moderate right and a trace left pleural effusion.  3. Right lower lobar consolidation/compression atelectasis.   This report was finalized on 04/05/2023 09:29 by Dr. Carly Espino MD.    CT Abdomen Pelvis Without Contrast [[id number]] Collected: 04/05/23 0909     Updated: 04/05/23 0922    Narrative:      EXAM/TECHNIQUE: CT abdomen pelvis without contrast     INDICATION: Lymphadenopathy, groin     COMPARISON: None     DLP: 969 mGy cm. Automated exposure control was also utilized to  decrease patient radiation dose.     FINDINGS:     Large pericardial effusion measuring up to 3 cm in thickness. Moderate  RIGHT pleural effusion with atelectasis.     Unenhanced liver appears unremarkable. Gallbladder is decompressed. No  gallstones or biliary ductal dilatation.      Pancreas, spleen, and RIGHT adrenal gland are unremarkable.  Indeterminate 4.0 x 2.3 cm LEFT adrenal gland nodule.      Multiple bilateral low-density renal lesions compatible with cysts. No  renal or ureteral calculi. No hydronephrosis. 1.7 cm calculus layering  in the posterior midline urinary bladder. Small locules of gas in the  urinary bladder lumen are noted. No focal urinary bladder wall  thickening.     Colonic diverticulosis without evidence of diverticulitis. No colonic  wall thickening or pericolonic fat stranding. No abnormal small bowel  distention or evidence of active small bowel inflammation. No  pneumatosis or evidence of pneumoperitoneum.     Small volume ascites throughout the abdomen and pelvis. No pelvic mass  organized pelvic collection. Prostate is enlarged measuring 5 cm  transverse dimension.  Nonaneurysmal atherosclerotic abdominal aorta. No  enlarged abdominal or pelvic lymph nodes. Moderate to large right-sided  hydrocele.     Diffuse body wall soft tissue edema. Severe osteoarthritis in the LEFT  hip. Grade 1 anterolisthesis of L4 on L5.       Impression:         1.  Large pericardial effusion. Close clinical follow-up is recommended.  2.  Moderate RIGHT pleural effusion with overlying atelectasis.  3.  Small volume ascites throughout the abdomen and pelvis.  4.  No abdominal or pelvic lymphadenopathy.  5.  Moderate to large right-sided hydrocele.  6.  1.7 cm calculus in the urinary bladder.  7.  Indeterminate 4 cm LEFT adrenal gland nodule. Differential includes  adrenal gland mass as well as focal adrenal gland hemorrhage. Recommend  follow-up adrenal protocol CT or MR.  8.  Enlarged prostate.  This report was finalized on 04/05/2023 09:19 by Dr. Jacoby Oscar MD.        Results for orders placed during the hospital encounter of 04/04/23    Adult Transthoracic Echo Complete W/ Cont if Necessary Per Protocol    Interpretation Summary  •  Left ventricular ejection fraction appears to be 56 - 60%.  •  Left ventricular diastolic function was indeterminate.  •  Left atrial volume is mildly increased.  •  Estimated right ventricular systolic pressure from tricuspid regurgitation is normal (<35 mmHg).  •  There is a large (>2cm) pericardial effusion.  •  Unclear if tamponade. No detailed interrogation performed. No clearcut RV or RA compression      Scheduled medications:  busPIRone, 10 mg, Oral, BID  colchicine, 0.6 mg, Oral, BID  heparin (porcine), 5,000 Units, Subcutaneous, Q8H  mirtazapine, 15 mg, Oral, Nightly  sotalol, 80 mg, Oral, BID      I have reviewed the patient's current medications.     Assessment/Plan   Assessment  Active Hospital Problems    Diagnosis    • **Pericardial effusion    • Essential hypertension    • Obstructive sleep apnea    • Obesity, morbid, BMI 50 or higher    • Paroxysmal  atrial fibrillation    • Stage 3a chronic kidney disease      Treatment Plan  Large pericardial effusion greater than 2 cm per echocardiogram 4/5/2023.  Cardiology, Dr. Eid and CTS Dr. Yip consulted.  Patient received 1 dose of Eliquis on admission and has been on hold since.  Dr. Yip notes patient with ascites and history of BRYAN and does not want to create true pericardial window as if patient developed worsening portal hypertension and ascites this could cause worsening pericardial effusion.  Pericardial effusion should be amendable to percutaneous drainage.  Cardiology plans for pericardiocentesis tomorrow.  Continue colchicine.    Paroxysmal atrial fibrillation.  Continue sotalol.  Eliquis on hold.  Heparin every 8 hours.    Stage IIIa chronic kidney disease.  Creatinine 1.4 on admission.  Creatinine 1.27 today.  EMP in AM.    Recent diagnosis obstructive sleep apnea.  Wife indicates patient prescribed BiPAP and [facility]  Social service consulted and firming with [facility] regarding [facility].  Continue oxygen at 3 L.    Primary hypertension.  Blood pressure 101/65, 90/57.    Morbid obesity with BMI 51.92.  Consult dietitian    Consult physical therapy.    Medical Decision Making  Number and Complexity of problems: 6  Large pericardial effusion: Acute, high complexity  Paroxysmal atrial fibrillation: Chronic, moderate complexity  Stage IIIa chronic kidney disease: Chronic, moderate complexity  Obstructive sleep apnea: New diagnosis, moderate complexity.  Primary hypertension: Chronic, low complexity  Morbid obesity: Chronic, low complexity    Differential Diagnosis: Malignant pericardial effusion    Conditions and Status        Condition is unchanged.     [facility] Data  External documents reviewed: [facility] transfer records  Cardiac tracing (EKG, telemetry) interpretation: Normal sinus rhythm, intermittent atrial flutter per telemetry  Radiology interpretation: Radiology interpretation CT  abdomen, reviewed echo  Labs reviewed:   CMP 4/6/2023  CBC  4/6/23    Any tests that were considered but not ordered: None     Decision rules/scores evaluated (example [id number], Wells, etc): None     Discussed with: Dr. Spain, patient, and wife     Care Planning  Shared decision making: Dr. Spain, patient and wife.  Patient agreeable to pericardiocentesis  Code status and discussions: Full code  The patient surrogate decision maker is his wife, Roselia    Disposition  Social Determinants of Health that impact treatment or disposition: None  I expect the patient to be discharged to home in 2-3 days.     Electronically signed by MARGUERITE William, 04/06/23, 14:22 CDT.

## 2023-04-06 NOTE — PROGRESS NOTES
RT EQUIPMENT DEVICE RELATED - SKIN ASSESSMENT    RT Medical Equipment/Device:     NIV Mask:  Full-face    size: lg    Skin Assessment:      Nose:  Intact    Device Skin Pressure Protection:  pt not wearing at this time    Nurse Notification:  Jacquie Yost, RRT

## 2023-04-06 NOTE — PLAN OF CARE
Goal Outcome Evaluation:           Progress: no change  Outcome Evaluation: No complaints of pain overnight. NSR/AF with 1AVB on tele. CPAP worn for a few hours. pt requiring 3L oxygen. pts blood pressure running low at 102/57 for 0400 vital sign check.

## 2023-04-07 ENCOUNTER — APPOINTMENT (OUTPATIENT)
Dept: CARDIOLOGY | Facility: HOSPITAL | Age: 73
DRG: 374 | End: 2023-04-07
Payer: OTHER GOVERNMENT

## 2023-04-07 PROBLEM — R06.89 HYPERCAPNIA: Status: ACTIVE | Noted: 2023-04-07

## 2023-04-07 LAB
ANION GAP SERPL CALCULATED.3IONS-SCNC: 7 MMOL/L (ref 5–15)
APPEARANCE FLD: ABNORMAL
BASOPHILS # BLD AUTO: 0.05 10*3/MM3 (ref 0–0.2)
BASOPHILS NFR BLD AUTO: 0.4 % (ref 0–1.5)
BUN SERPL-MCNC: 42 MG/DL (ref 8–23)
BUN/CREAT SERPL: 40 (ref 7–25)
CALCIUM SPEC-SCNC: 8.9 MG/DL (ref 8.6–10.5)
CHLORIDE SERPL-SCNC: 100 MMOL/L (ref 98–107)
CO2 SERPL-SCNC: 34 MMOL/L (ref 22–29)
COLOR FLD: ABNORMAL
CREAT SERPL-MCNC: 1.05 MG/DL (ref 0.76–1.27)
DEPRECATED RDW RBC AUTO: 50.9 FL (ref 37–54)
EGFRCR SERPLBLD CKD-EPI 2021: 75 ML/MIN/1.73
EOSINOPHIL # BLD AUTO: 0.32 10*3/MM3 (ref 0–0.4)
EOSINOPHIL NFR BLD AUTO: 2.4 % (ref 0.3–6.2)
EOSINOPHIL NFR FLD MANUAL: 1 %
ERYTHROCYTE [DISTWIDTH] IN BLOOD BY AUTOMATED COUNT: 14.9 % (ref 12.3–15.4)
GLUCOSE SERPL-MCNC: 109 MG/DL (ref 65–99)
HCT VFR BLD AUTO: 43.4 % (ref 37.5–51)
HGB BLD-MCNC: 13.2 G/DL (ref 13–17.7)
IMM GRANULOCYTES # BLD AUTO: 0.19 10*3/MM3 (ref 0–0.05)
IMM GRANULOCYTES NFR BLD AUTO: 1.4 % (ref 0–0.5)
LYMPHOCYTES # BLD AUTO: 0.98 10*3/MM3 (ref 0.7–3.1)
LYMPHOCYTES NFR BLD AUTO: 7.3 % (ref 19.6–45.3)
LYMPHOCYTES NFR FLD MANUAL: 25 %
MCH RBC QN AUTO: 28.2 PG (ref 26.6–33)
MCHC RBC AUTO-ENTMCNC: 30.4 G/DL (ref 31.5–35.7)
MCV RBC AUTO: 92.7 FL (ref 79–97)
MONOCYTES # BLD AUTO: 0.89 10*3/MM3 (ref 0.1–0.9)
MONOCYTES NFR BLD AUTO: 6.6 % (ref 5–12)
MONOCYTES NFR FLD: 21 %
NEUTROPHILS NFR BLD AUTO: 11.06 10*3/MM3 (ref 1.7–7)
NEUTROPHILS NFR BLD AUTO: 81.9 % (ref 42.7–76)
NEUTROPHILS NFR FLD MANUAL: 39 %
NRBC BLD AUTO-RTO: 0 /100 WBC (ref 0–0.2)
NUC CELL # FLD: 2478 /MM3
PLATELET # BLD AUTO: 128 10*3/MM3 (ref 140–450)
PMV BLD AUTO: 11.5 FL (ref 6–12)
POTASSIUM SERPL-SCNC: 4.1 MMOL/L (ref 3.5–5.2)
RBC # BLD AUTO: 4.68 10*6/MM3 (ref 4.14–5.8)
RBC # FLD AUTO: ABNORMAL /MM3
SODIUM SERPL-SCNC: 141 MMOL/L (ref 136–145)
UNCLASSIFIED CELLS, FLUID: 14 %
WBC NRBC COR # BLD: 13.49 10*3/MM3 (ref 3.4–10.8)

## 2023-04-07 PROCEDURE — C1729 CATH, DRAINAGE: HCPCS | Performed by: INTERNAL MEDICINE

## 2023-04-07 PROCEDURE — 82042 OTHER SOURCE ALBUMIN QUAN EA: CPT | Performed by: INTERNAL MEDICINE

## 2023-04-07 PROCEDURE — 99222 1ST HOSP IP/OBS MODERATE 55: CPT | Performed by: INTERNAL MEDICINE

## 2023-04-07 PROCEDURE — 99232 SBSQ HOSP IP/OBS MODERATE 35: CPT

## 2023-04-07 PROCEDURE — 94660 CPAP INITIATION&MGMT: CPT

## 2023-04-07 PROCEDURE — 89051 BODY FLUID CELL COUNT: CPT | Performed by: INTERNAL MEDICINE

## 2023-04-07 PROCEDURE — 25010000002 FENTANYL CITRATE (PF) 50 MCG/ML SOLUTION: Performed by: INTERNAL MEDICINE

## 2023-04-07 PROCEDURE — 87205 SMEAR GRAM STAIN: CPT | Performed by: INTERNAL MEDICINE

## 2023-04-07 PROCEDURE — 87070 CULTURE OTHR SPECIMN AEROBIC: CPT | Performed by: INTERNAL MEDICINE

## 2023-04-07 PROCEDURE — C1769 GUIDE WIRE: HCPCS | Performed by: INTERNAL MEDICINE

## 2023-04-07 PROCEDURE — 88341 IMHCHEM/IMCYTCHM EA ADD ANTB: CPT | Performed by: INTERNAL MEDICINE

## 2023-04-07 PROCEDURE — 25010000002 HEPARIN (PORCINE) 1000-0.9 UT/500ML-% SOLUTION: Performed by: INTERNAL MEDICINE

## 2023-04-07 PROCEDURE — 0W9D3ZZ DRAINAGE OF PERICARDIAL CAVITY, PERCUTANEOUS APPROACH: ICD-10-PCS | Performed by: INTERNAL MEDICINE

## 2023-04-07 PROCEDURE — 88342 IMHCHEM/IMCYTCHM 1ST ANTB: CPT | Performed by: INTERNAL MEDICINE

## 2023-04-07 PROCEDURE — 87116 MYCOBACTERIA CULTURE: CPT | Performed by: INTERNAL MEDICINE

## 2023-04-07 PROCEDURE — 85025 COMPLETE CBC W/AUTO DIFF WBC: CPT | Performed by: INTERNAL MEDICINE

## 2023-04-07 PROCEDURE — 94799 UNLISTED PULMONARY SVC/PX: CPT

## 2023-04-07 PROCEDURE — 33017 PRCRD DRG 6YR+ W/O CGEN CAR: CPT | Performed by: INTERNAL MEDICINE

## 2023-04-07 PROCEDURE — 25010000002 MIDAZOLAM PER 1 MG: Performed by: INTERNAL MEDICINE

## 2023-04-07 PROCEDURE — 87015 SPECIMEN INFECT AGNT CONCNTJ: CPT | Performed by: INTERNAL MEDICINE

## 2023-04-07 PROCEDURE — 97161 PT EVAL LOW COMPLEX 20 MIN: CPT | Performed by: PHYSICAL THERAPIST

## 2023-04-07 PROCEDURE — 88112 CYTOPATH CELL ENHANCE TECH: CPT | Performed by: INTERNAL MEDICINE

## 2023-04-07 PROCEDURE — 80048 BASIC METABOLIC PNL TOTAL CA: CPT | Performed by: HOSPITALIST

## 2023-04-07 PROCEDURE — 99152 MOD SED SAME PHYS/QHP 5/>YRS: CPT | Performed by: INTERNAL MEDICINE

## 2023-04-07 PROCEDURE — 88305 TISSUE EXAM BY PATHOLOGIST: CPT | Performed by: INTERNAL MEDICINE

## 2023-04-07 PROCEDURE — 84157 ASSAY OF PROTEIN OTHER: CPT | Performed by: INTERNAL MEDICINE

## 2023-04-07 PROCEDURE — 87206 SMEAR FLUORESCENT/ACID STAI: CPT | Performed by: INTERNAL MEDICINE

## 2023-04-07 RX ORDER — HEPARIN SODIUM 200 [USP'U]/100ML
INJECTION, SOLUTION INTRAVENOUS
Status: DISCONTINUED | OUTPATIENT
Start: 2023-04-07 | End: 2023-04-07 | Stop reason: HOSPADM

## 2023-04-07 RX ORDER — FERROUS SULFATE 325(65) MG
325 TABLET ORAL
Status: DISCONTINUED | OUTPATIENT
Start: 2023-04-08 | End: 2023-04-11 | Stop reason: HOSPADM

## 2023-04-07 RX ORDER — FENTANYL CITRATE 50 UG/ML
INJECTION, SOLUTION INTRAMUSCULAR; INTRAVENOUS
Status: DISCONTINUED | OUTPATIENT
Start: 2023-04-07 | End: 2023-04-07 | Stop reason: HOSPADM

## 2023-04-07 RX ORDER — SERTRALINE HYDROCHLORIDE 100 MG/1
200 TABLET, FILM COATED ORAL DAILY
Status: DISCONTINUED | OUTPATIENT
Start: 2023-04-07 | End: 2023-04-11 | Stop reason: HOSPADM

## 2023-04-07 RX ORDER — MELATONIN
1000 DAILY
Status: DISCONTINUED | OUTPATIENT
Start: 2023-04-07 | End: 2023-04-11 | Stop reason: HOSPADM

## 2023-04-07 RX ORDER — TERAZOSIN 5 MG/1
5 CAPSULE ORAL EVERY 12 HOURS SCHEDULED
Status: DISCONTINUED | OUTPATIENT
Start: 2023-04-07 | End: 2023-04-11 | Stop reason: HOSPADM

## 2023-04-07 RX ORDER — AMLODIPINE BESYLATE 5 MG/1
5 TABLET ORAL DAILY
Status: DISCONTINUED | OUTPATIENT
Start: 2023-04-07 | End: 2023-04-11 | Stop reason: HOSPADM

## 2023-04-07 RX ORDER — DOCUSATE SODIUM 100 MG/1
100 CAPSULE, LIQUID FILLED ORAL DAILY
Status: DISCONTINUED | OUTPATIENT
Start: 2023-04-07 | End: 2023-04-11 | Stop reason: HOSPADM

## 2023-04-07 RX ORDER — LIDOCAINE HYDROCHLORIDE 20 MG/ML
INJECTION, SOLUTION INFILTRATION; PERINEURAL
Status: DISCONTINUED | OUTPATIENT
Start: 2023-04-07 | End: 2023-04-07 | Stop reason: HOSPADM

## 2023-04-07 RX ORDER — MULTIPLE VITAMINS W/ MINERALS TAB 9MG-400MCG
1 TAB ORAL DAILY
Status: DISCONTINUED | OUTPATIENT
Start: 2023-04-07 | End: 2023-04-11 | Stop reason: HOSPADM

## 2023-04-07 RX ORDER — MIDAZOLAM HYDROCHLORIDE 1 MG/ML
INJECTION INTRAMUSCULAR; INTRAVENOUS
Status: DISCONTINUED | OUTPATIENT
Start: 2023-04-07 | End: 2023-04-07 | Stop reason: HOSPADM

## 2023-04-07 RX ORDER — FUROSEMIDE 40 MG/1
40 TABLET ORAL
Status: DISCONTINUED | OUTPATIENT
Start: 2023-04-07 | End: 2023-04-11 | Stop reason: HOSPADM

## 2023-04-07 RX ADMIN — TERAZOSIN HYDROCHLORIDE 5 MG: 5 CAPSULE ORAL at 20:54

## 2023-04-07 RX ADMIN — SOTALOL HYDROCHLORIDE 80 MG: 80 TABLET ORAL at 20:54

## 2023-04-07 RX ADMIN — FUROSEMIDE 40 MG: 40 TABLET ORAL at 17:46

## 2023-04-07 RX ADMIN — AMLODIPINE BESYLATE 5 MG: 5 TABLET ORAL at 15:57

## 2023-04-07 RX ADMIN — BUSPIRONE HYDROCHLORIDE 10 MG: 10 TABLET ORAL at 20:54

## 2023-04-07 RX ADMIN — COLCHICINE 0.6 MG: 0.6 TABLET ORAL at 08:56

## 2023-04-07 RX ADMIN — Medication 1 TABLET: at 15:58

## 2023-04-07 RX ADMIN — Medication 1000 UNITS: at 15:58

## 2023-04-07 RX ADMIN — SERTRALINE HYDROCHLORIDE 200 MG: 100 TABLET, FILM COATED ORAL at 15:58

## 2023-04-07 RX ADMIN — BUSPIRONE HYDROCHLORIDE 10 MG: 10 TABLET ORAL at 08:56

## 2023-04-07 RX ADMIN — DOCUSATE SODIUM 100 MG: 100 CAPSULE ORAL at 15:58

## 2023-04-07 RX ADMIN — MIRTAZAPINE 15 MG: 15 TABLET, FILM COATED ORAL at 20:55

## 2023-04-07 RX ADMIN — SOTALOL HYDROCHLORIDE 80 MG: 80 TABLET ORAL at 08:56

## 2023-04-07 RX ADMIN — COLCHICINE 0.6 MG: 0.6 TABLET ORAL at 20:54

## 2023-04-07 NOTE — PLAN OF CARE
Goal Outcome Evaluation:  Plan of Care Reviewed With: patient        Progress: no change  Outcome Evaluation: VSS, S 72-87. Pt had no c/o pain throughout the night. Pt was able to sleep well and has been off of bipap this morning, his O2 sats are in the high 90's on 3L NC. Pt has been NPO since midnight for procedure today. Safety maintained.

## 2023-04-07 NOTE — PROGRESS NOTES
[facility] Medicine Services  INPATIENT PROGRESS NOTE    Patient Name: Norm Tracey  Date of Admission: 4/4/2023  Today's Date: 04/07/23  Length of Stay: 3  Primary Care Physician: Provider, No Known    Subjective   Chief Complaint: Follow-up shortness of breath  HPI   Mr. Tracey transferred from [facility] 4/4/23 with worsening shortness of breath, atrial fibrillation with RVR on sotalol and Eliquis, and CT scan showing large pericardial effusion.  Patient treated for pericarditis as outpatient by Dr. Soria and was originally referred to him for new onset atrial fibrillation and found to have pericardial effusion.  He was started on colchicine and Lasix.  Pericardial effusion has worsened over the last few weeks associate with increasing shortness of breath.  Patient has a history of morbid obesity, hypertension, Combs, new diagnosis of obstructive sleep apnea.  Etiology of pericardial effusion unknown.  Patient undergoing work-up for hip surgery and found to have pericardial effusion and atrial fibrillation during work-up.  He represented to [facility] with worsening shortness of breath noted to be in A-fib with RVR.  He was started on a Cardizem drip and placed on BiPAP.  CT scan of the chest reported large pericardial effusion with bilateral pleural effusions but no tamponade.  Discussed with CT surgery Dr. Yip accepted patient in transfer.    Patient was seen earlier today.  Sitting on side of bed with oxygen at 3 L.  He wore BiPAP until around 5 AM.  No complaints of chest pain, palpitations or shortness of breath.  CTS and cardiology following. Dr. Yip reviewed echocardiogram and does not appreciate extracardiac mass anterior to the RV.  Given ascites Dr. Yip does not want to create a pericardial window as if he has worsening portal hypertension and ascites this could cause worsening pericardial effusion.   For plans for pericardiocentesis in  cardiac catheterization lab later today.    Pulmonary medicine consulted today and discussed with MARGUERITE Aguilar.  Apparently, patient recently told he had FELIX while at [place] with plans for BiPAP and oxygen.  However, patient does not have either BiPAP or oxygen at home.  Patient was hypercapnic yesterday afternoon and his mentation improved with wearing BiPAP.   [facility]  Patient denies complaints of chest pain, palpitations.  No nausea, vomiting, abdominal pain, fever or chills.    Review of Systems   Constitutional: Positive for activity change. Negative for chills, fatigue and fever.   HENT: Negative for congestion and trouble swallowing.    Eyes: Negative for photophobia and visual disturbance.   Respiratory: Positive for shortness of breath. Negative for wheezing.    Cardiovascular: Negative for chest pain and palpitations.   Gastrointestinal: Positive for abdominal distention. Negative for constipation, diarrhea, nausea and vomiting.   Endocrine: Negative for cold intolerance, heat intolerance and polyuria.   Genitourinary: Negative for dysuria, frequency and urgency.   Musculoskeletal: Negative for back pain.   Skin: Negative for color change, pallor, rash and wound.   Allergic/Immunologic: Negative for immunocompromised state.   Neurological: Positive for weakness. Negative for light-headedness.   Hematological: Negative for adenopathy. Does not bruise/bleed easily.   Psychiatric/Behavioral: Negative for agitation, behavioral problems and confusion.      All pertinent negatives and positives are as above. All other systems have been reviewed and are negative unless otherwise stated.     Objective    Temp:  [97.3 °F (36.3 °C)-97.7 °F (36.5 °C)] 97.7 °F (36.5 °C)  Heart Rate:  [64-78] 66  Resp:  [18-29] 20  BP: (105-138)/(68-99) 138/99  Physical Exam  Vitals and nursing note reviewed.   Constitutional:       Appearance: He is obese.      Comments: Eating up in  bed.  Oxygen at 3 L.  Saturation 97%.    HENT:      Head: Normocephalic and atraumatic.      Nose: No congestion.      Mouth/Throat:      Pharynx: Oropharynx is clear.   Cardiovascular:      Rate and Rhythm: Normal rate and regular rhythm.      Heart sounds: No murmur heard.     Comments: Normal sinus rhythm 80 on telemetry.  Pulmonary:      Breath sounds: No wheezing, rhonchi or rales.      Comments: Oxygen at 3 L.  Saturation 96%.  Abdominal:      General: There is distension.      Palpations: Abdomen is soft.   Genitourinary:     Comments: Voiding.  Musculoskeletal:         General: Swelling (Feet bilaterally) present.   Skin:     General: Skin is warm and dry.   Neurological:      General: No focal deficit present.      Mental Status: He is alert and oriented to person, place, and time.   Psychiatric:         Mood and Affect: Mood normal.         Behavior: Behavior normal.         Thought Content: Thought content normal.         Judgment: Judgment normal.       Results Review:  I have reviewed the labs, radiology results, and diagnostic studies.    Laboratory Data:   Results from last 7 days   Lab Units 04/07/23  0638 04/06/23  0834 04/05/23  0530   WBC 10*3/mm3 13.49* 11.47* 12.81*   HEMOGLOBIN g/dL 13.2 13.4 12.7*   HEMATOCRIT % 43.4 44.6 42.1   PLATELETS 10*3/mm3 128* 166 132*      Results from last 7 days   Lab Units 04/07/23  1009 04/06/23  0954 04/05/23  0005   SODIUM mmol/L 141 143 145   POTASSIUM mmol/L 4.1 4.1 3.8   CHLORIDE mmol/L 100 99 102   CO2 mmol/L 34.0* 36.0* 33.0*   BUN mg/dL 42* 47* 42*   CREATININE mg/dL 1.05 1.27 1.40*   CALCIUM mg/dL 8.9 8.9 9.0   BILIRUBIN mg/dL  --  0.5  --    ALK PHOS U/L  --  67  --    ALT (SGPT) U/L  --  31  --    AST (SGOT) U/L  --  17  --    GLUCOSE mg/dL 109* 186* 111*     Imaging Results (All)     Procedure Component Value Units Date/Time    CT Chest Without Contrast Diagnostic [[id number]] Collected: 04/05/23 0913     Updated: 04/05/23 0932    Narrative:       EXAMINATION: CT CHEST WO CONTRAST DIAGNOSTIC-      4/5/2023 8:48 AM CDT     HISTORY: pericardial effusion     In order to have a CT radiation dose as low as reasonably achievable  Automated Exposure Control was utilized for adjustment of the mA and/or  KV according to patient size.     DLP in mGycm= 969     The CT scan of the chest is performed without intravenous contrast  enhancement.     Images are acquired in axial plane and subsequent reconstruction in  coronal and sagittal planes.     There is no previous similar study for comparison.     There is significant respiratory motion artifacts which is noted  diagnostic quality of the study. This may obscure a subtle  nodule/lesion.     The lungs are poorly expanded.     There is a moderate right basal pleural effusion and a trace left basal  pleural effusion.     There is a right lower lobar consolidation adjacent to the pleural  effusion which may suggest compression atelectasis. Mild atelectatic  changes and pleural thickening is seen at the left base posteriorly.     Visualized central airway is patent. The distal airway is poorly  visualized and evaluated due to respiratory motion artifacts. No  significant endobronchial abnormality or a lesion.     The limited visualized soft tissues of the neck are unremarkable.  Probable low-density nodule in the left lobe of the thyroid gland. There  is no axillary lymphadenopathy.     Atheromatous changes of the thoracic aorta noted. No aneurysmal  dilatation. The pulmonary arteries are of normal size and shape. The  coronary arteries are not optimally visualized or evaluated. No  significant calcification the course of the coronary arteries.     There is a large pericardial effusion which measures up to 3.2 cm in  width.     There is a large Bochdalek hernia with peritoneal fat and infiltration.     The limited visualized unenhanced abdomen show small amount of fluid in  the upper abdomen around the liver and spleen.  Exophytic nodule/masses  are seen in the limited visualized kidneys. The gallbladder is small and  contracted. No radiopaque calculi are noted. Pancreas and kidneys are  incompletely included and not evaluated. The abdomen is separately  reviewed and reported with same day CT scan of the abdomen     Images reviewed in bone windows show chronic degenerative changes of the  thoracic spine with a mild levoscoliosis. No acute bony abnormality.  There is accentuated thoracic kyphosis. Hardware fusion of the lower  cervical spine is visualized. No hardware complication. Old healed  fractures of the left ribs are noted.       Impression:      1. A large pericardial effusion.  2. A moderate right and a trace left pleural effusion.  3. Right lower lobar consolidation/compression atelectasis.   This report was finalized on 04/05/2023 09:29 by Dr. Carly Espino MD.    CT Abdomen Pelvis Without Contrast [[id number]] Collected: 04/05/23 0909     Updated: 04/05/23 0922    Narrative:      EXAM/TECHNIQUE: CT abdomen pelvis without contrast     INDICATION: Lymphadenopathy, groin     COMPARISON: None     DLP: 969 mGy cm. Automated exposure control was also utilized to  decrease patient radiation dose.     FINDINGS:     Large pericardial effusion measuring up to 3 cm in thickness. Moderate  RIGHT pleural effusion with atelectasis.     Unenhanced liver appears unremarkable. Gallbladder is decompressed. No  gallstones or biliary ductal dilatation.      Pancreas, spleen, and RIGHT adrenal gland are unremarkable.  Indeterminate 4.0 x 2.3 cm LEFT adrenal gland nodule.      Multiple bilateral low-density renal lesions compatible with cysts. No  renal or ureteral calculi. No hydronephrosis. 1.7 cm calculus layering  in the posterior midline urinary bladder. Small locules of gas in the  urinary bladder lumen are noted. No focal urinary bladder wall  thickening.     Colonic diverticulosis without evidence of diverticulitis. No colonic  wall  thickening or pericolonic fat stranding. No abnormal small bowel  distention or evidence of active small bowel inflammation. No  pneumatosis or evidence of pneumoperitoneum.     Small volume ascites throughout the abdomen and pelvis. No pelvic mass  organized pelvic collection. Prostate is enlarged measuring 5 cm  transverse dimension. Nonaneurysmal atherosclerotic abdominal aorta. No  enlarged abdominal or pelvic lymph nodes. Moderate to large right-sided  hydrocele.     Diffuse body wall soft tissue edema. Severe osteoarthritis in the LEFT  hip. Grade 1 anterolisthesis of L4 on L5.       Impression:         1.  Large pericardial effusion. Close clinical follow-up is recommended.  2.  Moderate RIGHT pleural effusion with overlying atelectasis.  3.  Small volume ascites throughout the abdomen and pelvis.  4.  No abdominal or pelvic lymphadenopathy.  5.  Moderate to large right-sided hydrocele.  6.  1.7 cm calculus in the urinary bladder.  7.  Indeterminate 4 cm LEFT adrenal gland nodule. Differential includes  adrenal gland mass as well as focal adrenal gland hemorrhage. Recommend  follow-up adrenal protocol CT or MR.  8.  Enlarged prostate.  This report was finalized on 04/05/2023 09:19 by Dr. Jacoby Oscar MD.        Results for orders placed during the hospital encounter of 04/04/23    Adult Transthoracic Echo Complete W/ Cont if Necessary Per Protocol    Interpretation Summary  •  Left ventricular ejection fraction appears to be 56 - 60%.  •  Left ventricular diastolic function was indeterminate.  •  Left atrial volume is mildly increased.  •  Estimated right ventricular systolic pressure from tricuspid regurgitation is normal (<35 mmHg).  •  There is a large (>2cm) pericardial effusion.  •  Unclear if tamponade. No detailed interrogation performed. No clearcut RV or RA compression      Scheduled medications:  [MAR Hold] busPIRone, 10 mg, Oral, BID  [MAR Hold] colchicine, 0.6 mg, Oral, BID  [MAR Hold]  mirtazapine, 15 mg, Oral, Nightly  sotalol, 80 mg, Oral, BID      I have reviewed the patient's current medications.     Assessment/Plan   Assessment  Active Hospital Problems    Diagnosis    • **Pericardial effusion    • Hypercapnia    • Essential hypertension    • Suspected obstructive sleep apnea with hypercapnia and probable obesity hypoventilation    • Obesity, morbid, BMI 50 or higher    • Paroxysmal atrial fibrillation    • Stage 3a chronic kidney disease      Treatment Plan  Large pericardial effusion greater than 2 cm per echocardiogram 4/5/2023.  Cardiology, Dr. Eid and CTS Dr. Yip consulted.  Patient received 1 dose of Eliquis on admission and has been on hold since.  Dr. Yip notes patient with ascites and history of BRYAN and does not want to create true pericardial window as if patient developed worsening portal hypertension and ascites this could cause worsening pericardial effusion.  Pericardial effusion should be amendable to percutaneous drainage.  Dr. Gonsalves, cardiology plans for pericardiocentesis today. Continue colchicine.  Eliquis on hold for pericardiocentesis.  Heparin dose held.    Paroxysmal atrial fibrillation.  Continue sotalol.  Eliquis on hold.  Heparin every 8 hours and dose held for pericardiocentesis this afternoon.  Patient maintaining normal sinus rhythm 80s.    Stage IIIa chronic kidney disease.  Creatinine 1.4 on admission.  Creatinine 1.05 today.  BMP in AM.    Suspected obstructive sleep apnea with hypercapnia and probably component of obesity hypoventilation.  Wife indicates patient prescribed BiPAP and oxygen and [facility] to be [facility].  However, BiPAP and oxygen not yet delivered.   contacting [facility] and confirming device.  Pulmonary medicine consulted and discussed with MARGUERITE Ragsdale today.  Dr. Espinoza notes hypercapnia and suspects FLEIX with obesity hypoventilation.  If [facility] requires sleep study then we will confirm and get arranged.  Continue BiPAP at  night while hospitalized.  [facility] with the [facility] in [place] or pulmonary medicine [place].    Hypercapnia.  ABGs 4/6/23 pH 7.29, PCO2 71 on 3 L.  Placed on BiPAP.  Pulmonary medicine consulted.    Primary hypertension.  Blood pressure 138/99, 113/78    Morbid obesity with BMI 51.92.  Consult dietitian    Consult physical therapy.    Medical Decision Making  Number and Complexity of problems: 6  Large pericardial effusion: Acute, high complexity  Paroxysmal atrial fibrillation: Chronic, moderate complexity  Stage IIIa chronic kidney disease: Chronic, moderate complexity  Suspected obstructive sleep apnea: New diagnosis, moderate complexity.  Primary hypertension: Chronic, low complexity  Morbid obesity: Chronic, low complexity    Differential Diagnosis: Malignant pericardial effusion    Conditions and Status        Condition is unchanged.     [facility] Data  External documents reviewed: [facility] transfer records  Cardiac tracing (EKG, telemetry) interpretation: Normal sinus rhythm, intermittent atrial flutter per telemetry  Radiology interpretation: Radiology interpretation CT abdomen, reviewed echo  Labs reviewed:   CMP 4/7/2023  CBC  4/7/23    Any tests that were considered but not ordered: None     Decision rules/scores evaluated (example [id number], [place], etc): None     Discussed with: Ngoc Villareal, MARGUERITE pulmonology, and patient     Care Planning  Shared decision making: Ngoc Villareal APRN pulmonology and patient. Patient agreeable to pericardiocentesis and pulmonary medicine consult.  Code status and discussions: Full code  The patient surrogate decision maker is his wife, Roselia Hooper  Social Determinants of Health that impact treatment or disposition: None  I expect the patient to be discharged to home in 2-3 days.     Electronically signed by MARGUERITE William, 04/07/23, 13:08 CDT.

## 2023-04-07 NOTE — PROGRESS NOTES
[facility] -  Progress Note     LOS: 3 days   Patient Care Team:  Provider, No Known as PCP - General    Chief Complaint: Follow-up shortness of breath    Subjective     Interval History:   Upon my examination the patient is sitting comfortably on the edge of the bed.  He says that his abdomen has been swelling more more here recently.  He said he is unable to lay flat.  He said that he was recommended to have oxygen at home but he has not started this yet.  He denies any chest pain, palpitation, presyncope, or syncope.  He has not ate anything after midnight anticipation of pericardiocentesis        Review of Systems:     Review of Systems   Respiratory: Positive for shortness of breath. Negative for chest tightness.    Cardiovascular: Negative for chest pain and leg swelling.   Gastrointestinal: Positive for abdominal distention.     Objective     Vital Sign Min/Max for last 24 hours  Temp  Min: 97.3 °F (36.3 °C)  Max: 98.4 °F (36.9 °C)   BP  Min: 101/65  Max: 129/82   Pulse  Min: 64  Max: 78   Resp  Min: 20  Max: 29   SpO2  Min: 90 %  Max: 100 %   No data recorded   Weight  Min: 126 kg (278 lb)  Max: 126 kg (278 lb)         04/07/23  0422   Weight: 126 kg (278 lb)       Telemetry: Normal sinus rhythm with rates in the 60s and 70s      Physical Exam:    Constitutional:       Appearance: Healthy appearance. Not in distress.   Neck:      Vascular: JVD normal.   Pulmonary:      Effort: Pulmonary effort is normal.      Breath sounds: Normal breath sounds. No wheezing. No rhonchi. No rales.   Cardiovascular:      PMI at left midclavicular line. Normal rate. Irregularly irregular rhythm. Normal S1. Normal S2.      Murmurs: There is no murmur.      No gallop. No click. No rub.   Abdominal:      General: There is distension.      Palpations: Abdomen is rigid.   Musculoskeletal: Normal range of motion.      Cervical back: Normal range of motion. Skin:     General: Skin is warm and dry.    Neurological:      Mental Status: Alert and oriented to person, place and time.       Results Review:   Lab Results (last 72 hours)     Procedure Component Value Units Date/Time    CBC & Differential [[id number]]  (Abnormal) Collected: 04/07/23 0638    Specimen: Blood Updated: 04/07/23 0705    Narrative:      The following orders were created for panel order CBC & Differential.  Procedure                               Abnormality         Status                     ---------                               -----------         ------                     CBC Auto Differential[[id number]]        Abnormal            Final result                 Please view results for these tests on the individual orders.    CBC Auto Differential [[id number]]  (Abnormal) Collected: 04/07/23 0638    Specimen: Blood Updated: 04/07/23 0705     WBC 13.49 10*3/mm3      RBC 4.68 10*6/mm3      Hemoglobin 13.2 g/dL      Hematocrit 43.4 %      MCV 92.7 fL      MCH 28.2 pg      MCHC 30.4 g/dL      RDW 14.9 %      RDW-SD 50.9 fl      MPV 11.5 fL      Platelets 128 10*3/mm3      Neutrophil % 81.9 %      Lymphocyte % 7.3 %      Monocyte % 6.6 %      Eosinophil % 2.4 %      Basophil % 0.4 %      Immature Grans % 1.4 %      Neutrophils, Absolute 11.06 10*3/mm3      Lymphocytes, Absolute 0.98 10*3/mm3      Monocytes, Absolute 0.89 10*3/mm3      Eosinophils, Absolute 0.32 10*3/mm3      Basophils, Absolute 0.05 10*3/mm3      Immature Grans, Absolute 0.19 10*3/mm3      nRBC 0.0 /100 WBC     Narrative:      CKD    Blood Gas, Arterial - [[id number]]  (Abnormal) Collected: 04/06/23 1710    Specimen: Arterial Blood Updated: 04/06/23 1739     Site Right Radial     Bryant's Test Positive     pH, Arterial 7.312 pH units      Comment: 84 Value below reference range        pCO2, Arterial 69.2 mm Hg      Comment: 86 Value above critical limit        pO2, Arterial 79.9 mm Hg      Comment: 84 Value below reference range        HCO3, Arterial 34.9 mmol/L      Comment: 83  Value above reference range        Base Excess, Arterial 6.5 mmol/L      Comment: 83 Value above reference range        O2 Saturation, Arterial 96.3 %      Temperature 37.0 C      Barometric Pressure for Blood Gas 759 mmHg      Modality BiPap     FIO2 30 %      Comment: Appended report. These results have been appended to a previously final verified report.        Ventilator Mode NA     Set [facility] Resp Rate 12.0     IPAP 16     Comment: Meter: [id number]     :  [id number]        EPAP 10     Notified [facility] [id number]     Notified By [id number]     Notified Time 04/06/2023 17:12     Collected by [id number]     pCO2, Temperature Corrected 69.2 mm Hg      pH, Temp Corrected 7.312 pH Units      pO2, Temperature Corrected 79.9 mm Hg     Blood Gas, Arterial - [[id number]]  (Abnormal) Collected: 04/06/23 1547    Specimen: Arterial Blood Updated: 04/06/23 1547     Site Right Radial     Bryant's Test Positive     pH, Arterial 7.297 pH units      Comment: 84 Value below reference range        pCO2, Arterial 71.2 mm Hg      Comment: 86 Value above critical limit        pO2, Arterial 79.8 mm Hg      Comment: 84 Value below reference range        HCO3, Arterial 34.8 mmol/L      Comment: 83 Value above reference range        Base Excess, Arterial 6.1 mmol/L      Comment: 83 Value above reference range        O2 Saturation, Arterial 95.8 %      Temperature 37.0 C      Barometric Pressure for Blood Gas 759 mmHg      Modality Nasal Cannula     Flow Rate 3.0 lpm      Ventilator Mode NA     Notified [facility] [id number]     Notified By [id number]     Notified Time 04/06/2023 15:49     Collected by [id number]     Comment: Meter: [id number]     :  [id number]        pCO2, Temperature Corrected 71.2 mm Hg      pH, Temp Corrected 7.297 pH Units      pO2, Temperature Corrected 79.8 mm Hg     Comprehensive Metabolic Panel [[id number]]  (Abnormal) Collected: 04/06/23 0954    Specimen: Blood Updated: 04/06/23 1027     Glucose 186 mg/dL      BUN 47 mg/dL       Creatinine 1.27 mg/dL      Sodium 143 mmol/L      Potassium 4.1 mmol/L      Chloride 99 mmol/L      CO2 36.0 mmol/L      Calcium 8.9 mg/dL      Total Protein 6.7 g/dL      Albumin 4.2 g/dL      ALT (SGPT) 31 U/L      AST (SGOT) 17 U/L      Alkaline Phosphatase 67 U/L      Total Bilirubin 0.5 mg/dL      Globulin 2.5 gm/dL      A/G Ratio 1.7 g/dL      BUN/Creatinine Ratio 37.0     Anion Gap 8.0 mmol/L      eGFR 59.7 mL/min/1.73     Narrative:      GFR Normal >60  Chronic Kidney Disease <60  Kidney Failure <15    The GFR formula is only valid for adults with stable renal function between ages 18 and 70.    CBC & Differential [[id number]]  (Abnormal) Collected: 04/06/23 0834    Specimen: Blood Updated: 04/06/23 0907    Narrative:      The following orders were created for panel order CBC & Differential.  Procedure                               Abnormality         Status                     ---------                               -----------         ------                     CBC Auto Differential[[id number]]        Abnormal            Final result                 Please view results for these tests on the individual orders.    CBC Auto Differential [[id number]]  (Abnormal) Collected: 04/06/23 0834    Specimen: Blood Updated: 04/06/23 0907     WBC 11.47 10*3/mm3      RBC 4.60 10*6/mm3      Hemoglobin 13.4 g/dL      Hematocrit 44.6 %      MCV 97.0 fL      MCH 29.1 pg      MCHC 30.0 g/dL      RDW 14.6 %      RDW-SD 51.8 fl      MPV 11.7 fL      Platelets 166 10*3/mm3      Neutrophil % 82.9 %      Lymphocyte % 7.7 %      Monocyte % 6.7 %      Eosinophil % 1.7 %      Basophil % 0.3 %      Immature Grans % 0.7 %      Neutrophils, Absolute 9.51 10*3/mm3      Lymphocytes, Absolute 0.88 10*3/mm3      Monocytes, Absolute 0.77 10*3/mm3      Eosinophils, Absolute 0.20 10*3/mm3      Basophils, Absolute 0.03 10*3/mm3      Immature Grans, Absolute 0.08 10*3/mm3      nRBC 0.0 /100 WBC     TSH [[id number]]  (Normal) Collected: 04/05/23 0530     Specimen: Blood Updated: 04/05/23 0643     TSH 2.720 uIU/mL     C-reactive Protein [[id number]]  (Normal) Collected: 04/05/23 0530    Specimen: Blood Updated: 04/05/23 0643     C-Reactive Protein <0.30 mg/dL     CBC & Differential [[id number]]  (Abnormal) Collected: 04/05/23 0530    Specimen: Blood Updated: 04/05/23 0616    Narrative:      The following orders were created for panel order CBC & Differential.  Procedure                               Abnormality         Status                     ---------                               -----------         ------                     CBC Auto Differential[[id number]]        Abnormal            Final result                 Please view results for these tests on the individual orders.    CBC Auto Differential [[id number]]  (Abnormal) Collected: 04/05/23 0530    Specimen: Blood Updated: 04/05/23 0616     WBC 12.81 10*3/mm3      RBC 4.42 10*6/mm3      Hemoglobin 12.7 g/dL      Hematocrit 42.1 %      MCV 95.2 fL      MCH 28.7 pg      MCHC 30.2 g/dL      RDW 14.3 %      RDW-SD 50.0 fl      MPV 12.2 fL      Platelets 132 10*3/mm3      Neutrophil % 87.7 %      Lymphocyte % 4.6 %      Monocyte % 6.2 %      Eosinophil % 0.5 %      Basophil % 0.2 %      Immature Grans % 0.8 %      Neutrophils, Absolute 11.24 10*3/mm3      Lymphocytes, Absolute 0.59 10*3/mm3      Monocytes, Absolute 0.80 10*3/mm3      Eosinophils, Absolute 0.06 10*3/mm3      Basophils, Absolute 0.02 10*3/mm3      Immature Grans, Absolute 0.10 10*3/mm3      nRBC 0.0 /100 WBC     Basic Metabolic Panel [[id number]]  (Abnormal) Collected: 04/05/23 0005    Specimen: Blood Updated: 04/05/23 0053     Glucose 111 mg/dL      BUN 42 mg/dL      Creatinine 1.40 mg/dL      Sodium 145 mmol/L      Potassium 3.8 mmol/L      Comment: Slight hemolysis detected by analyzer. Results may be affected.        Chloride 102 mmol/L      CO2 33.0 mmol/L      Calcium 9.0 mg/dL      BUN/Creatinine Ratio 30.0     Anion Gap 10.0 mmol/L      eGFR  53.1 mL/min/1.73     Narrative:      GFR Normal >60  Chronic Kidney Disease <60  Kidney Failure <15    The GFR formula is only valid for adults with stable renal function between ages 18 and 70.           Results for orders placed during the hospital encounter of 04/04/23    Adult Transthoracic Echo Complete W/ Cont if Necessary Per Protocol    Interpretation Summary  •  Left ventricular ejection fraction appears to be 56 - 60%.  •  Left ventricular diastolic function was indeterminate.  •  Left atrial volume is mildly increased.  •  Estimated right ventricular systolic pressure from tricuspid regurgitation is normal (<35 mmHg).  •  There is a large (>2cm) pericardial effusion.  •  Unclear if tamponade. No detailed interrogation performed. No clearcut RV or RA compression        Medication Review: yes  Current Facility-Administered Medications   Medication Dose Route Frequency Provider Last Rate Last Admin   • busPIRone (BUSPAR) tablet 10 mg  10 mg Oral BID Vinay Noland MD   10 mg at 04/07/23 0856   • colchicine tablet 0.6 mg  0.6 mg Oral BID Vinay Noland MD   0.6 mg at 04/07/23 0856   • mirtazapine (REMERON) tablet 15 mg  15 mg Oral Nightly Vinay Noland MD   15 mg at 04/06/23 2042   • sodium chloride 0.9 % infusion  75 mL/hr Intravenous Continuous Shayne Eid MD 75 mL/hr at 04/06/23 0908 75 mL/hr at 04/06/23 0908   • sotalol (BETAPACE) tablet 80 mg  80 mg Oral BID Vinay Noland MD   80 mg at 04/07/23 0856   • traMADol (ULTRAM) tablet 50 mg  50 mg Oral BID PRN Vinay Noland MD             Assessment & Plan         Pericardial effusion: Large pericardial effusion noted on echocardiogram.  N.p.o. for procedure today.  Dr. Gonsalves will take the patient to the cardiac catheterization lab to perform apex approach pericardiocentesis.  Family was informed.  -Hold afternoon heparin dose  -Anticoagulation per Dr. Gonsalves after pericardiocentesis.    Hypertension: Normotensive blood  pressures.    Paroxysmal atrial fibrillation: Normal sinus at this time.  Holding blood thinner at this time for pericardiocentesis.  Pending results we will plan on reinitiating Eliquis when appropriate.  Rate controlled at this time.    Obstructive sleep apnea: BiPAP when asleep.    Electronically signed by MARGUERITE Pina, 04/07/23, 9:03 AM CDT.

## 2023-04-07 NOTE — NURSING NOTE
Difficulty emptying pericardialcentesis bag due to clot in bag.  200 ml of bright red blood emptied per Naina Carias.  Patient tolerated well.  Tegaderm dressing reinforced to left chest site.

## 2023-04-07 NOTE — PROGRESS NOTES
RT EQUIPMENT DEVICE RELATED - SKIN ASSESSMENT    RT Medical Equipment/Device:     NIV Mask:  Full-face    size: L    Skin Assessment:      Cheek:  Intact  Neck:  Intact  Nose:  Intact  Mouth:  Intact    Device Skin Pressure Protection:  Skin-to-device areas padded:  Hydrocolloid nasal strips on bridge of nose    Nurse Notification:  Jacquie Chen, RRT

## 2023-04-07 NOTE — CASE MANAGEMENT/SOCIAL WORK
Continued Stay Note   Kylah     Patient Name: Norm Tracey  MRN: [id number]  Today's Date: 4/7/2023    Admit Date: 4/4/2023    Plan: Home with spouse   Discharge Plan     Row Name 04/07/23 1417       Plan    Plan Comments SW has [facility] to inquire about status of bipap.               Discharge Codes    No documentation.               Expected Discharge Date and Time     Expected Discharge Date Expected Discharge Time    Apr 7, 2023             THAI Velasco

## 2023-04-07 NOTE — PLAN OF CARE
Goal Outcome Evaluation:    Problem: Noninvasive Ventilation Acute  Goal: Effective Unassisted Ventilation and Oxygenation  Outcome: Ongoing, Progressing  Intervention: Monitor and Manage Noninvasive Ventilation  Recent Flowsheet Documentation  Taken 4/6/2023 2310 by Poppy Chen RRT  NPPV/CPAP Maintenance:   mask adjusted   mask secure     Problem: Obstructive Sleep Apnea Risk or Actual Comorbidity Management  Goal: Unobstructed Breathing During Sleep  Intervention: Monitor and Manage Obstructive Sleep Apnea  Recent Flowsheet Documentation  Taken 4/6/2023 2310 by Poppy Chen RRT  NPPV/CPAP Maintenance:   mask adjusted   mask secure

## 2023-04-07 NOTE — THERAPY EVALUATION
Patient Name: Norm Tracey  : 1950    MRN: [id number]                              Today's Date: 2023       Admit Date: 2023    Visit Dx:     ICD-10-CM ICD-9-CM   1. Pericardial effusion  I31.39 423.9   2. Impaired mobility  Z74.09 799.89     Patient Active Problem List   Diagnosis   • Pericardial effusion   • Essential hypertension   • Obstructive sleep apnea   • Obesity, morbid, BMI 50 or higher   • Paroxysmal atrial fibrillation   • Stage 3a chronic kidney disease     Past Medical History:   Diagnosis Date   • Atrial fibrillation with rapid ventricular response    • Essential hypertension    • Obesity due to excess calories with serious comorbidity    • Pneumonia    • Respiratory failure    • Sleep apnea      History reviewed. No pertinent surgical history.   General Information     Row Name 23 [id number]          Physical Therapy Time and Intention    Document Type evaluation  Pt transferred from [facility]  with worsening SOA, a-fib w/ RVR, and CT scan showing large pericardial effusion. Dx: Pericardial effusion. PMH: Morbid obesity, A-fib on sotalol and Eliquis, FELIX on CPAP at night, HTN.  -SB (r) AA (t) SB (c)     Mode of Treatment physical therapy  -SB (r) AA (t) SB (c)     Row Name 23 0862          General Information    Patient Profile Reviewed yes  -SB (r) AA (t) SB (c)     Prior Level of Function independent:;all household mobility;community mobility;ADL's;feeding;grooming;dressing;bathing;dependent:;cooking;cleaning;driving  -SB (r) AA (t) SB (c)     Existing Precautions/Restrictions NPO;fall;oxygen therapy device and L/min  2L  -SB (r) AA (t) SB (c)     Barriers to Rehab medically complex  -SB (r) AA (t) SB (c)     Row Name 23 7596          Living Environment    People in Home spouse  -SB (r) AA (t) SB (c)     Row Name 23 0814          Home Main Entrance    Number of Stairs, Main Entrance two  -SB (r) AA (t) SB (c)     Stair Railings, Main Entrance  railings on both sides of stairs  -SB (r) AA (t) SB (c)     Row Name 04/07/23 0850          Stairs Within Home, Primary    Number of Stairs, Within Home, Primary two  -SB (r) AA (t) SB (c)     Stair Railings, Within Home, Primary none  -SB (r) AA (t) SB (c)     Row Name 04/07/23 0850          Cognition    Orientation Status (Cognition) oriented x 4  -SB (r) AA (t) SB (c)     Row Name 04/07/23 0850          Safety Issues, Functional Mobility    Impairments Affecting Function (Mobility) balance;endurance/activity tolerance;shortness of breath;strength  -SB (r) AA (t) SB (c)           User Key  (r) = Recorded By, (t) = Taken By, (c) = Cosigned By    Initials Name Provider Type    Halie Rodriguez, PT DPT Physical Therapist    Pamela Correa, PT Student PT Student               Mobility     Row Name 04/07/23 0850          Bed Mobility    Bed Mobility supine-sit  -SB (r) AA (t) SB (c)     Supine-Sit [place] (Bed Mobility) minimum assist (75% patient effort);verbal cues  -SB (r) AA (t) SB (c)     Assistive Device (Bed Mobility) bed rails;head of bed elevated  -SB (r) AA (t) SB (c)     Row Name 04/07/23 0850          Sit-Stand Transfer    Sit-Stand [place] (Transfers) minimum assist (75% patient effort);verbal cues  -SB (r) AA (t) SB (c)     Row Name 04/07/23 0850          Gait/Stairs (Locomotion)    [place] Level (Gait) contact guard  -SB (r) AA (t) SB (c)     Distance in Feet (Gait) 120 ft  -SB (r) AA (t) SB (c)     Comment, (Gait/Stairs) Pt was unsteady during gait and reported feeling a little off balance.  -SB (r) AA (t) SB (c)           User Key  (r) = Recorded By, (t) = Taken By, (c) = Cosigned By    Initials Name Provider Type    Halie Rodriguez, PT DPT Physical Therapist    Pamela Correa, PT Student PT Student               Obj/Interventions     Row Name 04/07/23 0850          Range of Motion Comprehensive    General Range of Motion bilateral lower extremity ROM WFL  -SB (r) AA (t) SB (c)      Row Name 04/07/23 0850          Strength Comprehensive (MMT)    General Manual Muscle Testing (MMT) Assessment lower extremity strength deficits identified  -SB (r) AA (t) SB (c)     Comment, General Manual Muscle Testing (MMT) Assessment B LE 4/5  -SB (r) AA (t) SB (c)     Row Name 04/07/23 0850          Balance    Balance Assessment sitting static balance;sitting dynamic balance;standing static balance;standing dynamic balance  -SB (r) AA (t) SB (c)     Static Sitting Balance supervision  -SB (r) AA (t) SB (c)     Dynamic Sitting Balance standby assist  -SB (r) AA (t) SB (c)     Position, Sitting Balance unsupported;sitting edge of bed  -SB (r) AA (t) SB (c)     Static Standing Balance contact guard  -SB (r) AA (t) SB (c)     Dynamic Standing Balance contact guard  -SB (r) AA (t) SB (c)     Position/Device Used, Standing Balance unsupported  -SB (r) AA (t) SB (c)     Row Name 04/07/23 0850          Sensory Assessment (Somatosensory)    Sensory Assessment (Somatosensory) LE sensation intact  -SB (r) AA (t) SB (c)           User Key  (r) = Recorded By, (t) = Taken By, (c) = Cosigned By    Initials Name Provider Type    SB Halie Cobb, PT DPT Physical Therapist    Pamela Correa, PT Student PT Student               Goals/Plan     Row Name 04/07/23 0850          Bed Mobility Goal 1 (PT)    Activity/Assistive Device (Bed Mobility Goal 1, PT) sit to supine/supine to sit  -SB (r) AA (t) SB (c)     [place] Level/Cues Needed (Bed Mobility Goal 1, PT) contact guard required  -SB (r) AA (t) SB (c)     Time Frame (Bed Mobility Goal 1, PT) long term goal (LTG)  -SB (r) AA (t) SB (c)     Progress/Outcomes (Bed Mobility Goal 1, PT) new goal  -SB (r) AA (t) SB (c)     Row Name 04/07/23 0850          Transfer Goal 1 (PT)    Activity/Assistive Device (Transfer Goal 1, PT) sit-to-stand/stand-to-sit;bed-to-chair/chair-to-bed;walker, rolling  -SB (r) AA (t) SB (c)     [place] Level/Cues Needed (Transfer Goal 1, PT)  contact guard required  -SB (r) AA (t) SB (c)     Time Frame (Transfer Goal 1, PT) long term goal (LTG)  -SB (r) AA (t) SB (c)     Progress/Outcome (Transfer Goal 1, PT) new goal  -SB (r) AA (t) SB (c)     Row Name 04/07/23 0850          Gait Training Goal 1 (PT)    Activity/Assistive Device (Gait Training Goal 1, PT) gait (walking locomotion);improve balance and speed;increase endurance/gait distance;walker, rolling  -SB (r) AA (t) SB (c)     [place] Level (Gait Training Goal 1, PT) contact guard required  -SB (r) AA (t) SB (c)     Distance (Gait Training Goal 1, PT) 250 ft  -SB (r) AA (t) SB (c)     Time Frame (Gait Training Goal 1, PT) long term goal (LTG)  -SB (r) AA (t) SB (c)     Progress/Outcome (Gait Training Goal 1, PT) new goal  -SB (r) AA (t) SB (c)     Row Name 04/07/23 0850          Stairs Goal 1 (PT)    Activity/Assistive Device (Stairs Goal 1, PT) ascending stairs;descending stairs;using handrail, left;using handrail, right;improve balance and speed  -SB (r) AA (t) SB (c)     [place] Level/Cues Needed (Stairs Goal 1, PT) contact guard required  -SB (r) AA (t) SB (c)     Number of Stairs (Stairs Goal 1, PT) 2  -SB (r) AA (t) SB (c)     Time Frame (Stairs Goal 1, PT) long term goal (LTG)  -SB (r) AA (t) SB (c)     Progress/Outcome (Stairs Goal 1, PT) new goal  -SB (r) AA (t) SB (c)     Row Name 04/07/23 0850          Therapy Assessment/Plan (PT)    Planned Therapy Interventions (PT) balance training;bed mobility training;gait training;patient/family education;strengthening;transfer training;stair training  -SB (r) AA (t) SB (c)           User Key  (r) = Recorded By, (t) = Taken By, (c) = Cosigned By    Initials Name Provider Type    Halie Rodriguez, PT DPT Physical Therapist    Pamela Correa, PT Student PT Student               Clinical Impression     Row Name 04/07/23 0850          Pain    Pretreatment Pain Rating 0/10 - no pain  -SB (r) AA (t) SB (c)     Posttreatment Pain Rating 0/10  - no pain  -SB (r) AA (t) SB (c)     Additional Documentation Pain Scale: Numbers Pre/Post-Treatment (Group)  -SB (r) AA (t) SB (c)     Row Name 04/07/23 0802          Plan of Care Review    Plan of Care Reviewed With patient  -SB (r) AA (t) SB (c)     Progress no change  -SB (r) AA (t) SB (c)     Outcome Evaluation PT eval completed. Pt oriented x4 and on 2L O2 via NC with no complaints of pain. Pt required min A for supine to sit with use of bed rails and HOB elevated. Pt required min A for sit to stand with verbal cues to stand upright and steady himself before trying to walk. Pt was able to ambulate 120 ft with CGA. Pt was a little unsteady during gait and reported feeling a little off balance. Pt may benefit from a FWW due to unsteadiness. Pt stated he felt dizzy after sitting down at EOB. Skilled PT is necessary in order to improve balance, bed mobility, transfers, endurance, gait, and stairs. Recommend d/c home with assist and HH.  -SB (r) AA (t) SB (c)     Row Name 04/07/23 0858          Therapy Assessment/Plan (PT)    Patient/Family Therapy Goals Statement (PT) To improve mobility  -SB (r) AA (t) SB (c)     Rehab Potential (PT) good, to achieve stated therapy goals  -SB (r) AA (t) SB (c)     Criteria for Skilled Interventions Met (PT) yes;meets criteria;skilled treatment is necessary  -SB (r) AA (t) SB (c)     Therapy Frequency (PT) 2 times/day  -SB (r) AA (t) SB (c)     Predicted Duration of Therapy Intervention (PT) until d/c or goals met  -SB (r) AA (t) SB (c)     Row Name 04/07/23 0857          Vital Signs    Pre SpO2 (%) 98  -SB (r) AA (t) SB (c)     O2 Delivery Pre Treatment nasal cannula  2L  -SB (r) AA (t) SB (c)     O2 Delivery Intra Treatment nasal cannula  2L  -SB (r) AA (t) SB (c)     O2 Delivery Post Treatment nasal cannula  2L  -SB (r) AA (t) SB (c)     Pre Patient Position Supine  -SB (r) AA (t) SB (c)     Intra Patient Position Standing  -SB (r) AA (t) SB (c)     Post Patient Position  Sitting  -SB (r) AA (t) SB (c)     Row Name 04/07/23 0850          Positioning and Restraints    Pre-Treatment Position in bed  -SB (r) AA (t) SB (c)     Post Treatment Position bed  -SB (r) AA (t) SB (c)     In Bed notified nsg;sitting EOB;call light within reach;encouraged to call for assist;with family/caregiver  -SB (r) AA (t) SB (c)           User Key  (r) = Recorded By, (t) = Taken By, (c) = Cosigned By    Initials Name Provider Type    Halie Rodriguez, PT DPT Physical Therapist    Pamela Correa, PT Student PT Student               Outcome Measures     Row Name 04/07/23 0856 04/07/23 0850       How much help from another person do you currently need...    Turning from your back to your side while in flat bed without using bedrails? 3  -AC 3  -SB (r) AA (t) SB (c)    Moving from lying on back to sitting on the side of a flat bed without bedrails? 3  -AC 3  -SB (r) AA (t) SB (c)    Moving to and from a bed to a chair (including a wheelchair)? 3  -AC 3  -SB (r) AA (t) SB (c)    Standing up from a chair using your arms (e.g., wheelchair, bedside chair)? 3  -AC 3  -SB (r) AA (t) SB (c)    Climbing 3-5 steps with a railing? 3  -AC 3  -SB (r) AA (t) SB (c)    To walk in hospital room? 3  -AC 3  -SB (r) AA (t) SB (c)    AM-PAC 6 Clicks Score (PT) 18  -AC 18  -SB (r) AA (t)    Highest level of mobility 6 --> Walked 10 steps or more  -AC 6 --> Walked 10 steps or more  -SB (r) AA (t)    Row Name 04/07/23 0850          Functional Assessment    Outcome Measure Options AM-PAC 6 Clicks Basic Mobility (PT)  -SB (r) AA (t) SB (c)           User Key  (r) = Recorded By, (t) = Taken By, (c) = Cosigned By    Initials Name Provider Type    Buzz Nicole, RN Registered Nurse    SB Cobb, Summer, PT DPT Physical Therapist    Pamela Correa, PT Student PT Student                             Physical Therapy Education     Title: PT OT SLP Therapies (In Progress)     Topic: Physical Therapy (In Progress)     Point:  Mobility training (Done)     Learning Progress Summary           Patient Acceptance, E, VU by MARSHALL at 4/7/2023 0930    Comment: Pt was educated on POC, benefits of activity, and safety with transfers and gait.                   Point: Home exercise program (Not Started)     Learner Progress:  Not documented in this visit.          Point: Body mechanics (Not Started)     Learner Progress:  Not documented in this visit.          Point: Precautions (Not Started)     Learner Progress:  Not documented in this visit.                      User Key     Initials Effective Dates Name Provider Type Discipline     01/03/23 -  Pamela Tinajero, PT Student PT Student PT              PT Recommendation and Plan  Planned Therapy Interventions (PT): balance training, bed mobility training, gait training, patient/family education, strengthening, transfer training, stair training  Plan of Care Reviewed With: patient  Progress: no change  Outcome Evaluation: PT eval completed. Pt oriented x4 and on 2L O2 via NC with no complaints of pain. Pt required min A for supine to sit with use of bed rails and HOB elevated. Pt required min A for sit to stand with verbal cues to stand upright and steady himself before trying to walk. Pt was able to ambulate 120 ft with CGA. Pt was a little unsteady during gait and reported feeling a little off balance. Pt may benefit from a FWW due to unsteadiness. Pt stated he felt dizzy after sitting down at EOB. Skilled PT is necessary in order to improve balance, bed mobility, transfers, endurance, gait, and stairs. Recommend d/c home with assist and HH.     Time Calculation:    PT Charges     Row Name 04/07/23 0930             Time Calculation    Start Time 0850  -SB (r) AA (t) SB (c)      Stop Time 0920  -SB (r) AA (t) SB (c)      Time Calculation (min) 30 min  -SB (r) AA (t)      PT Received On 04/07/23  -SB (r) AA (t) SB (c)      PT Goal Re-Cert Due Date 04/17/23  -SB (r) AA (t) SB (c)            User Key   (r) = Recorded By, (t) = Taken By, (c) = Cosigned By    Initials Name Provider Type    Halie Rodriguez, PT DPT Physical Therapist    Pamela Correa, PT Student PT Student                  PT G-Codes  Outcome Measure Options: AM-PAC 6 Clicks Basic Mobility (PT)  AM-PAC 6 Clicks Score (PT): 18  PT Discharge Summary  Anticipated Discharge Disposition (PT): home with assist, home with home health    Pamela Tinajero, PT Student  4/7/2023

## 2023-04-07 NOTE — PLAN OF CARE
Goal Outcome Evaluation:  Plan of Care Reviewed With: patient        Progress: no change  Outcome Evaluation: PT eval completed. Pt oriented x4 and on 2L O2 via NC with no complaints of pain. Pt required min A for supine to sit with use of bed rails and HOB elevated. Pt required min A for sit to stand with verbal cues to stand upright and steady himself before trying to walk. Pt was able to ambulate 120 ft with CGA. Pt was a little unsteady during gait and reported feeling a little off balance. Pt may benefit from a FWW due to unsteadiness. Pt stated he felt dizzy after sitting down at EOB. Skilled PT is necessary in order to improve balance, bed mobility, transfers, endurance, gait, and stairs. Recommend d/c home with assist and HH.

## 2023-04-07 NOTE — CONSULTS
[facility] [place] [facility] - [facility]    23, 09:07 CDT  Patient Care Team:  Provider, No Known as PCP - General  Name: Norm Tracey  : 1950  MRN: [id number]  Contact Serial Number [id number]    Chief complaint: suspected obesity hypoventilation syndrome.  HPI:  We have been consulted by Ivis Spain to see this 72 y.o. male.  This is an [employer]  who [facility] [place], [place].  He was worked up in [place] for pericarditis, and concomitantly he has been observed to have apneas with sleep.  He was admitted to the hospital in [place] with afib and transferred here for invasive management of the effusion which has not improved with medical therapy.  Staff at [place] apparently had initiated efforts to get him evaluated/treated for sleep apnea.  Pt reports no history of copd or other lung disease.  Past Medical History:   has a past medical history of Atrial fibrillation with rapid ventricular response, Essential hypertension, Obesity due to excess calories with serious comorbidity, Pneumonia, Respiratory failure, and Sleep apnea.   has no past surgical history on file.  No Known Allergies  Medications:  busPIRone, 10 mg, Oral, BID  colchicine, 0.6 mg, Oral, BID  heparin (porcine), 5,000 Units, Subcutaneous, Q8H  mirtazapine, 15 mg, Oral, Nightly  sotalol, 80 mg, Oral, BID      sodium chloride, 75 mL/hr, Last Rate: 75 mL/hr (23 0908)      Family History:  History reviewed. No pertinent family history.  Social History:   reports that he quit smoking about 3 years ago. His smoking use included cigarettes. He has never used smokeless tobacco. He reports that he does not currently use alcohol. He reports that he does not use drugs.  Review of Systems:  Review of Systems   Constitutional: Negative for diaphoresis.   Respiratory: Negative for cough and wheezing.    Psychiatric/Behavioral: The patient is not nervous/anxious.        Physical Exam:  Temp:  [97.3 °F (36.3 °C)-98.4 °F (36.9 °C)] 97.4 °F (36.3 °C)  Heart Rate:  [64-78] 76  Resp:  [20-29] 20  BP: (101-129)/(65-86) 115/80No intake or output data in the 24 hours ending 04/07/23 0907      04/05/23  1108 04/05/23  1945 04/07/23  0422   Weight: 125 kg (276 lb) 125 kg (274 lb 12.8 oz) 126 kg (278 lb)     SpO2 Percentage    04/07/23 0335 04/07/23 0422 04/07/23 0719   SpO2: 96% 93% 100%     Body mass index is 52.53 kg/m².   Physical Exam  Constitutional:       General: He is not in acute distress.     Appearance: Normal appearance. He is not ill-appearing, toxic-appearing or diaphoretic.   HENT:      Nose: Nose normal.   Eyes:      Extraocular Movements: Extraocular movements intact.   Cardiovascular:      Rate and Rhythm: Normal rate.   Pulmonary:      Effort: Pulmonary effort is normal.      Breath sounds: No wheezing, rhonchi or rales.   Skin:     Findings: No erythema or rash.   Neurological:      Mental Status: He is alert.       Result Review  Results from last 7 days   Lab Units 04/07/23  0638 04/06/23  0834 04/05/23  0530   WBC 10*3/mm3 13.49* 11.47* 12.81*   HEMOGLOBIN g/dL 13.2 13.4 12.7*   PLATELETS 10*3/mm3 128* 166 132*     Results from last 7 days   Lab Units 04/06/23  0954 04/05/23  0005   SODIUM mmol/L 143 145   POTASSIUM mmol/L 4.1 3.8   CO2 mmol/L 36.0* 33.0*   BUN mg/dL 47* 42*   CREATININE mg/dL 1.27 1.40*   GLUCOSE mg/dL 186* 111*     Results from last 7 days   Lab Units 04/06/23  1710 04/06/23  1547   PH, ARTERIAL pH units 7.312* 7.297*   PCO2, ARTERIAL mm Hg 69.2* 71.2*   PO2 ART mm Hg 79.9* 79.8*   FIO2 % 30  --      Microbiology Results (last 10 days)     ** No results found for the last 240 hours. **        Recent radiology:   Imaging Results (Last 72 Hours)     Procedure Component Value Units Date/Time    CT Chest Without Contrast Diagnostic [[id number]] Collected: 04/05/23 0913     Updated: 04/05/23 0932    Narrative:      EXAMINATION: CT CHEST WO CONTRAST  DIAGNOSTIC-      4/5/2023 8:48 AM CDT     HISTORY: pericardial effusion     In order to have a CT radiation dose as low as reasonably achievable  Automated Exposure Control was utilized for adjustment of the mA and/or  KV according to patient size.     DLP in mGycm= 969     The CT scan of the chest is performed without intravenous contrast  enhancement.     Images are acquired in axial plane and subsequent reconstruction in  coronal and sagittal planes.     There is no previous similar study for comparison.     There is significant respiratory motion artifacts which is noted  diagnostic quality of the study. This may obscure a subtle  nodule/lesion.     The lungs are poorly expanded.     There is a moderate right basal pleural effusion and a trace left basal  pleural effusion.     There is a right lower lobar consolidation adjacent to the pleural  effusion which may suggest compression atelectasis. Mild atelectatic  changes and pleural thickening is seen at the left base posteriorly.     Visualized central airway is patent. The distal airway is poorly  visualized and evaluated due to respiratory motion artifacts. No  significant endobronchial abnormality or a lesion.     The limited visualized soft tissues of the neck are unremarkable.  Probable low-density nodule in the left lobe of the thyroid gland. There  is no axillary lymphadenopathy.     Atheromatous changes of the thoracic aorta noted. No aneurysmal  dilatation. The pulmonary arteries are of normal size and shape. The  coronary arteries are not optimally visualized or evaluated. No  significant calcification the course of the coronary arteries.     There is a large pericardial effusion which measures up to 3.2 cm in  width.     There is a large Bochdalek hernia with peritoneal fat and infiltration.     The limited visualized unenhanced abdomen show small amount of fluid in  the upper abdomen around the liver and spleen. Exophytic nodule/masses  are seen in  the limited visualized kidneys. The gallbladder is small and  contracted. No radiopaque calculi are noted. Pancreas and kidneys are  incompletely included and not evaluated. The abdomen is separately  reviewed and reported with same day CT scan of the abdomen     Images reviewed in bone windows show chronic degenerative changes of the  thoracic spine with a mild levoscoliosis. No acute bony abnormality.  There is accentuated thoracic kyphosis. Hardware fusion of the lower  cervical spine is visualized. No hardware complication. Old healed  fractures of the left ribs are noted.       Impression:      1. A large pericardial effusion.  2. A moderate right and a trace left pleural effusion.  3. Right lower lobar consolidation/compression atelectasis.   This report was finalized on 04/05/2023 09:29 by Dr. Carly Espino MD.    CT Abdomen Pelvis Without Contrast [[id number]] Collected: 04/05/23 0909     Updated: 04/05/23 0922    Narrative:      EXAM/TECHNIQUE: CT abdomen pelvis without contrast     INDICATION: Lymphadenopathy, groin     COMPARISON: None     DLP: 969 mGy cm. Automated exposure control was also utilized to  decrease patient radiation dose.     FINDINGS:     Large pericardial effusion measuring up to 3 cm in thickness. Moderate  RIGHT pleural effusion with atelectasis.     Unenhanced liver appears unremarkable. Gallbladder is decompressed. No  gallstones or biliary ductal dilatation.      Pancreas, spleen, and RIGHT adrenal gland are unremarkable.  Indeterminate 4.0 x 2.3 cm LEFT adrenal gland nodule.      Multiple bilateral low-density renal lesions compatible with cysts. No  renal or ureteral calculi. No hydronephrosis. 1.7 cm calculus layering  in the posterior midline urinary bladder. Small locules of gas in the  urinary bladder lumen are noted. No focal urinary bladder wall  thickening.     Colonic diverticulosis without evidence of diverticulitis. No colonic  wall thickening or pericolonic fat  stranding. No abnormal small bowel  distention or evidence of active small bowel inflammation. No  pneumatosis or evidence of pneumoperitoneum.     Small volume ascites throughout the abdomen and pelvis. No pelvic mass  organized pelvic collection. Prostate is enlarged measuring 5 cm  transverse dimension. Nonaneurysmal atherosclerotic abdominal aorta. No  enlarged abdominal or pelvic lymph nodes. Moderate to large right-sided  hydrocele.     Diffuse body wall soft tissue edema. Severe osteoarthritis in the LEFT  hip. Grade 1 anterolisthesis of L4 on L5.       Impression:         1.  Large pericardial effusion. Close clinical follow-up is recommended.  2.  Moderate RIGHT pleural effusion with overlying atelectasis.  3.  Small volume ascites throughout the abdomen and pelvis.  4.  No abdominal or pelvic lymphadenopathy.  5.  Moderate to large right-sided hydrocele.  6.  1.7 cm calculus in the urinary bladder.  7.  Indeterminate 4 cm LEFT adrenal gland nodule. Differential includes  adrenal gland mass as well as focal adrenal gland hemorrhage. Recommend  follow-up adrenal protocol CT or MR.  8.  Enlarged prostate.  This report was finalized on 04/05/2023 09:19 by Dr. Jacoby Oscar MD.        Personal review of imaging: CT scan shows pericardial effusion right sided pl effusion, atelectasis rll on ct chest  Other test results (not lab or imaging): Results for orders placed during the hospital encounter of 04/04/23    Adult Transthoracic Echo Complete W/ Cont if Necessary Per Protocol    Interpretation Summary  •  Left ventricular ejection fraction appears to be 56 - 60%.  •  Left ventricular diastolic function was indeterminate.  •  Left atrial volume is mildly increased.  •  Estimated right ventricular systolic pressure from tricuspid regurgitation is normal (<35 mmHg).  •  There is a large (>2cm) pericardial effusion.  •  Unclear if tamponade. No detailed interrogation performed. No clearcut RV or RA compression      Independent review of ekg: telem afib  Problem List as identified by Epic (may contain historical, inactive problems)    Pericardial effusion    Essential hypertension    Obstructive sleep apnea    Obesity, morbid, BMI 50 or higher    Paroxysmal atrial fibrillation    Stage 3a chronic kidney disease    Pulmonary Assessment:    1. Right pleural effusion  2. Obesity  3. hypercapnia  4. Suspected ofelia, and with hypercapnia probably a component of obesity hypoventilation  5. Pericardial effusion  6. Ascites  7. Adrenal lesion    Recommend/plan:   · Anticipate pericardiocentesis  · Will ask  to contact sw at [facility] to assess status of ofelia workup.  If [facility] then we will confirm that that gets arranged  · Continue bipap hs here in meantime  · Follow up imaging subacutely to reassess pleural effusion; anticipate improvement with control of afib, pericarditis.    · Pt likely will [facility] the [facility] in [place] or with pulmonary in [place] following discharge; I am willing to see him as well if necessary after hospital stay    Thank you for this consult.  We will follow along.  Electronically signed by Matthew Espinoza MD, 04/07/23, 9:07 AM CDT.

## 2023-04-08 ENCOUNTER — APPOINTMENT (OUTPATIENT)
Dept: CARDIOLOGY | Facility: HOSPITAL | Age: 73
DRG: 374 | End: 2023-04-08
Payer: OTHER GOVERNMENT

## 2023-04-08 LAB
ALBUMIN FLD-MCNC: 3.6 G/DL
ANION GAP SERPL CALCULATED.3IONS-SCNC: 6 MMOL/L (ref 5–15)
BASOPHILS # BLD AUTO: 0.02 10*3/MM3 (ref 0–0.2)
BASOPHILS NFR BLD AUTO: 0.3 % (ref 0–1.5)
BH CV VAS BP LEFT ARM: NORMAL MMHG
BUN SERPL-MCNC: 26 MG/DL (ref 8–23)
BUN/CREAT SERPL: 35.6 (ref 7–25)
CALCIUM SPEC-SCNC: 8.5 MG/DL (ref 8.6–10.5)
CHLORIDE SERPL-SCNC: 104 MMOL/L (ref 98–107)
CO2 SERPL-SCNC: 34 MMOL/L (ref 22–29)
CREAT SERPL-MCNC: 0.73 MG/DL (ref 0.76–1.27)
DEPRECATED RDW RBC AUTO: 50.2 FL (ref 37–54)
EGFRCR SERPLBLD CKD-EPI 2021: 96.1 ML/MIN/1.73
EOSINOPHIL # BLD AUTO: 0.14 10*3/MM3 (ref 0–0.4)
EOSINOPHIL NFR BLD AUTO: 2.4 % (ref 0.3–6.2)
ERYTHROCYTE [DISTWIDTH] IN BLOOD BY AUTOMATED COUNT: 14.1 % (ref 12.3–15.4)
GLUCOSE SERPL-MCNC: 126 MG/DL (ref 65–99)
HCT VFR BLD AUTO: 38.7 % (ref 37.5–51)
HGB BLD-MCNC: 11.3 G/DL (ref 13–17.7)
LYMPHOCYTES # BLD AUTO: 0.48 10*3/MM3 (ref 0.7–3.1)
LYMPHOCYTES NFR BLD AUTO: 8.1 % (ref 19.6–45.3)
MAXIMAL PREDICTED HEART RATE: 147 BPM
MCH RBC QN AUTO: 28.3 PG (ref 26.6–33)
MCHC RBC AUTO-ENTMCNC: 29.2 G/DL (ref 31.5–35.7)
MCV RBC AUTO: 97 FL (ref 79–97)
MONOCYTES # BLD AUTO: 0.34 10*3/MM3 (ref 0.1–0.9)
MONOCYTES NFR BLD AUTO: 5.7 % (ref 5–12)
NEUTROPHILS NFR BLD AUTO: 4.93 10*3/MM3 (ref 1.7–7)
NEUTROPHILS NFR BLD AUTO: 83 % (ref 42.7–76)
PLATELET # BLD AUTO: 96 10*3/MM3 (ref 140–450)
PMV BLD AUTO: 11.9 FL (ref 6–12)
POTASSIUM SERPL-SCNC: 3.9 MMOL/L (ref 3.5–5.2)
PROT FLD-MCNC: 5.7 G/DL
RBC # BLD AUTO: 3.99 10*6/MM3 (ref 4.14–5.8)
SODIUM SERPL-SCNC: 144 MMOL/L (ref 136–145)
STRESS TARGET HR: 125 BPM
WBC NRBC COR # BLD: 5.94 10*3/MM3 (ref 3.4–10.8)

## 2023-04-08 PROCEDURE — 80048 BASIC METABOLIC PNL TOTAL CA: CPT | Performed by: HOSPITALIST

## 2023-04-08 PROCEDURE — 93308 TTE F-UP OR LMTD: CPT | Performed by: HOSPITALIST

## 2023-04-08 PROCEDURE — 94799 UNLISTED PULMONARY SVC/PX: CPT

## 2023-04-08 PROCEDURE — 99232 SBSQ HOSP IP/OBS MODERATE 35: CPT

## 2023-04-08 PROCEDURE — 97116 GAIT TRAINING THERAPY: CPT

## 2023-04-08 PROCEDURE — 99232 SBSQ HOSP IP/OBS MODERATE 35: CPT | Performed by: INTERNAL MEDICINE

## 2023-04-08 PROCEDURE — 93308 TTE F-UP OR LMTD: CPT

## 2023-04-08 PROCEDURE — 85025 COMPLETE CBC W/AUTO DIFF WBC: CPT | Performed by: INTERNAL MEDICINE

## 2023-04-08 PROCEDURE — 94660 CPAP INITIATION&MGMT: CPT

## 2023-04-08 PROCEDURE — 25010000002 HEPARIN (PORCINE) PER 1000 UNITS

## 2023-04-08 RX ORDER — HEPARIN SODIUM 5000 [USP'U]/ML
5000 INJECTION, SOLUTION INTRAVENOUS; SUBCUTANEOUS EVERY 8 HOURS SCHEDULED
Status: DISCONTINUED | OUTPATIENT
Start: 2023-04-08 | End: 2023-04-11

## 2023-04-08 RX ADMIN — Medication 1 TABLET: at 09:32

## 2023-04-08 RX ADMIN — TERAZOSIN HYDROCHLORIDE 5 MG: 5 CAPSULE ORAL at 21:05

## 2023-04-08 RX ADMIN — HEPARIN SODIUM 5000 UNITS: 5000 INJECTION INTRAVENOUS; SUBCUTANEOUS at 21:05

## 2023-04-08 RX ADMIN — AMLODIPINE BESYLATE 5 MG: 5 TABLET ORAL at 09:32

## 2023-04-08 RX ADMIN — HEPARIN SODIUM 5000 UNITS: 5000 INJECTION INTRAVENOUS; SUBCUTANEOUS at 15:14

## 2023-04-08 RX ADMIN — COLCHICINE 0.6 MG: 0.6 TABLET ORAL at 21:05

## 2023-04-08 RX ADMIN — SOTALOL HYDROCHLORIDE 80 MG: 80 TABLET ORAL at 21:05

## 2023-04-08 RX ADMIN — Medication 1000 UNITS: at 09:32

## 2023-04-08 RX ADMIN — SERTRALINE HYDROCHLORIDE 200 MG: 100 TABLET, FILM COATED ORAL at 09:32

## 2023-04-08 RX ADMIN — SOTALOL HYDROCHLORIDE 80 MG: 80 TABLET ORAL at 09:32

## 2023-04-08 RX ADMIN — TERAZOSIN HYDROCHLORIDE 5 MG: 5 CAPSULE ORAL at 09:32

## 2023-04-08 RX ADMIN — BUSPIRONE HYDROCHLORIDE 10 MG: 10 TABLET ORAL at 09:32

## 2023-04-08 RX ADMIN — COLCHICINE 0.6 MG: 0.6 TABLET ORAL at 09:32

## 2023-04-08 RX ADMIN — FUROSEMIDE 40 MG: 40 TABLET ORAL at 09:32

## 2023-04-08 RX ADMIN — MIRTAZAPINE 15 MG: 15 TABLET, FILM COATED ORAL at 21:05

## 2023-04-08 RX ADMIN — FUROSEMIDE 40 MG: 40 TABLET ORAL at 17:37

## 2023-04-08 RX ADMIN — BUSPIRONE HYDROCHLORIDE 10 MG: 10 TABLET ORAL at 21:05

## 2023-04-08 RX ADMIN — FERROUS SULFATE TAB 325 MG (65 MG ELEMENTAL FE) 325 MG: 325 (65 FE) TAB at 09:32

## 2023-04-08 NOTE — PROGRESS NOTES
[facility] Medicine Services  INPATIENT PROGRESS NOTE    Patient Name: Norm Tracey  Date of Admission: 4/4/2023  Today's Date: 04/08/23  Length of Stay: 4  Primary Care Physician: Provider, No Known    Subjective   Chief Complaint: Follow-up shortness of breath  HPI   Mr. Tracey transferred from [facility] 4/4/23 with worsening shortness of breath, atrial fibrillation with RVR on sotalol and Eliquis, and CT scan showing large pericardial effusion.  Patient treated for pericarditis as outpatient by Dr. Soria and was originally referred to him for new onset atrial fibrillation and found to have pericardial effusion.  He was started on colchicine and Lasix.  Pericardial effusion has worsened over the last few weeks associate with increasing shortness of breath.  Patient has a history of morbid obesity, hypertension, Combs, new diagnosis of obstructive sleep apnea.  Etiology of pericardial effusion unknown.  Patient undergoing work-up for hip surgery and found to have pericardial effusion and atrial fibrillation during work-up.  He represented to [facility] with worsening shortness of breath noted to be in A-fib with RVR.  He was started on a Cardizem drip and placed on BiPAP.  CT scan of the chest reported large pericardial effusion with bilateral pleural effusions but no tamponade.  Discussed with CT surgery Dr. Yip accepted patient in transfer.    Sitting up in bed.  Oxygen at 1 L.  Saturation 94%.  He reports breathing much better after pericardiocentesis.  No complaints of chest pain or palpitations.  He denies nausea, vomiting or abdominal pain.  He did wear the BiPAP intermittently during the night.  1150 cc bloody fluid removed initially with pericardiocentesis.  Reported patient had additional 600 mL fluid drainage.  Discussed with MARGUERITE Murphy cardiology today and will resume heparin.  Eliquis will be held until pericardial tube removed.   D[facility]ed [facility]    Review of Systems   Constitutional: Positive for activity change. Negative for chills, fatigue and fever.   HENT: Negative for congestion and trouble swallowing.    Eyes: Negative for photophobia and visual disturbance.   Respiratory: Positive for shortness of breath. Negative for wheezing.    Cardiovascular: Negative for chest pain and palpitations.   Gastrointestinal: Positive for abdominal distention. Negative for constipation, diarrhea, nausea and vomiting.   Endocrine: Negative for cold intolerance, heat intolerance and polyuria.   Genitourinary: Negative for dysuria, frequency and urgency.   Musculoskeletal: Negative for back pain.   Skin: Negative for color change, pallor, rash and wound.   Allergic/Immunologic: Negative for immunocompromised state.   Neurological: Positive for weakness. Negative for light-headedness.   Hematological: Negative for adenopathy. Does not bruise/bleed easily.   Psychiatric/Behavioral: Negative for agitation, behavioral problems and confusion.      All pertinent negatives and positives are as above. All other systems have been reviewed and are negative unless otherwise stated.     Objective    Temp:  [97.2 °F (36.2 °C)-97.7 °F (36.5 °C)] 97.5 °F (36.4 °C)  Heart Rate:  [] 88  Resp:  [17-20] 18  BP: ()/() 126/74  Physical Exam  Vitals and nursing note reviewed.   Constitutional:       Appearance: He is obese.      Comments: Eating up in bed.  Oxygen at 3 L.  Saturation 97%.    HENT:      Head: Normocephalic and atraumatic.      Nose: No congestion.      Mouth/Throat:      Pharynx: Oropharynx is clear.   Cardiovascular:      Rate and Rhythm: Normal rate and regular rhythm.      Heart sounds: No murmur heard.     Comments: Normal sinus rhythm 80 on telemetry.  Pulmonary:      Breath sounds: No wheezing, rhonchi or rales.      Comments: Oxygen at 3 L.  Saturation 96%.  Abdominal:      General: There is distension.       Palpations: Abdomen is soft.   Genitourinary:     Comments: Voiding.  Musculoskeletal:         General: Swelling (Feet bilaterally) present.   Skin:     General: Skin is warm and dry.   Neurological:      General: No focal deficit present.      Mental Status: He is alert and oriented to person, place, and time.   Psychiatric:         Mood and Affect: Mood normal.         Behavior: Behavior normal.         Thought Content: Thought content normal.         Judgment: Judgment normal.       Results Review:  I have reviewed the labs, radiology results, and diagnostic studies.    Laboratory Data:   Results from last 7 days   Lab Units 04/08/23  0514 04/07/23  0638 04/06/23  0834   WBC 10*3/mm3 5.94 13.49* 11.47*   HEMOGLOBIN g/dL 11.3* 13.2 13.4   HEMATOCRIT % 38.7 43.4 44.6   PLATELETS 10*3/mm3 96* 128* 166      Results from last 7 days   Lab Units 04/08/23  1120 04/07/23  1009 04/06/23  0954   SODIUM mmol/L 144 141 143   POTASSIUM mmol/L 3.9 4.1 4.1   CHLORIDE mmol/L 104 100 99   CO2 mmol/L 34.0* 34.0* 36.0*   BUN mg/dL 26* 42* 47*   CREATININE mg/dL 0.73* 1.05 1.27   CALCIUM mg/dL 8.5* 8.9 8.9   BILIRUBIN mg/dL  --   --  0.5   ALK PHOS U/L  --   --  67   ALT (SGPT) U/L  --   --  31   AST (SGOT) U/L  --   --  17   GLUCOSE mg/dL 126* 109* 186*     Imaging Results (All)     Procedure Component Value Units Date/Time    CT Chest Without Contrast Diagnostic [[id number]] Collected: 04/05/23 0913     Updated: 04/05/23 0932    Narrative:      EXAMINATION: CT CHEST WO CONTRAST DIAGNOSTIC-      4/5/2023 8:48 AM CDT     HISTORY: pericardial effusion     In order to have a CT radiation dose as low as reasonably achievable  Automated Exposure Control was utilized for adjustment of the mA and/or  KV according to patient size.     DLP in mGycm= 969     The CT scan of the chest is performed without intravenous contrast  enhancement.     Images are acquired in axial plane and subsequent reconstruction in  coronal and sagittal planes.      There is no previous similar study for comparison.     There is significant respiratory motion artifacts which is noted  diagnostic quality of the study. This may obscure a subtle  nodule/lesion.     The lungs are poorly expanded.     There is a moderate right basal pleural effusion and a trace left basal  pleural effusion.     There is a right lower lobar consolidation adjacent to the pleural  effusion which may suggest compression atelectasis. Mild atelectatic  changes and pleural thickening is seen at the left base posteriorly.     Visualized central airway is patent. The distal airway is poorly  visualized and evaluated due to respiratory motion artifacts. No  significant endobronchial abnormality or a lesion.     The limited visualized soft tissues of the neck are unremarkable.  Probable low-density nodule in the left lobe of the thyroid gland. There  is no axillary lymphadenopathy.     Atheromatous changes of the thoracic aorta noted. No aneurysmal  dilatation. The pulmonary arteries are of normal size and shape. The  coronary arteries are not optimally visualized or evaluated. No  significant calcification the course of the coronary arteries.     There is a large pericardial effusion which measures up to 3.2 cm in  width.     There is a large Bochdalek hernia with peritoneal fat and infiltration.     The limited visualized unenhanced abdomen show small amount of fluid in  the upper abdomen around the liver and spleen. Exophytic nodule/masses  are seen in the limited visualized kidneys. The gallbladder is small and  contracted. No radiopaque calculi are noted. Pancreas and kidneys are  incompletely included and not evaluated. The abdomen is separately  reviewed and reported with same day CT scan of the abdomen     Images reviewed in bone windows show chronic degenerative changes of the  thoracic spine with a mild levoscoliosis. No acute bony abnormality.  There is accentuated thoracic kyphosis. Hardware  fusion of the lower  cervical spine is visualized. No hardware complication. Old healed  fractures of the left ribs are noted.       Impression:      1. A large pericardial effusion.  2. A moderate right and a trace left pleural effusion.  3. Right lower lobar consolidation/compression atelectasis.   This report was finalized on 04/05/2023 09:29 by Dr. Carly Espino MD.    CT Abdomen Pelvis Without Contrast [[id number]] Collected: 04/05/23 0909     Updated: 04/05/23 0922    Narrative:      EXAM/TECHNIQUE: CT abdomen pelvis without contrast     INDICATION: Lymphadenopathy, groin     COMPARISON: None     DLP: 969 mGy cm. Automated exposure control was also utilized to  decrease patient radiation dose.     FINDINGS:     Large pericardial effusion measuring up to 3 cm in thickness. Moderate  RIGHT pleural effusion with atelectasis.     Unenhanced liver appears unremarkable. Gallbladder is decompressed. No  gallstones or biliary ductal dilatation.      Pancreas, spleen, and RIGHT adrenal gland are unremarkable.  Indeterminate 4.0 x 2.3 cm LEFT adrenal gland nodule.      Multiple bilateral low-density renal lesions compatible with cysts. No  renal or ureteral calculi. No hydronephrosis. 1.7 cm calculus layering  in the posterior midline urinary bladder. Small locules of gas in the  urinary bladder lumen are noted. No focal urinary bladder wall  thickening.     Colonic diverticulosis without evidence of diverticulitis. No colonic  wall thickening or pericolonic fat stranding. No abnormal small bowel  distention or evidence of active small bowel inflammation. No  pneumatosis or evidence of pneumoperitoneum.     Small volume ascites throughout the abdomen and pelvis. No pelvic mass  organized pelvic collection. Prostate is enlarged measuring 5 cm  transverse dimension. Nonaneurysmal atherosclerotic abdominal aorta. No  enlarged abdominal or pelvic lymph nodes. Moderate to large right-sided  hydrocele.     Diffuse body wall  soft tissue edema. Severe osteoarthritis in the LEFT  hip. Grade 1 anterolisthesis of L4 on L5.       Impression:         1.  Large pericardial effusion. Close clinical follow-up is recommended.  2.  Moderate RIGHT pleural effusion with overlying atelectasis.  3.  Small volume ascites throughout the abdomen and pelvis.  4.  No abdominal or pelvic lymphadenopathy.  5.  Moderate to large right-sided hydrocele.  6.  1.7 cm calculus in the urinary bladder.  7.  Indeterminate 4 cm LEFT adrenal gland nodule. Differential includes  adrenal gland mass as well as focal adrenal gland hemorrhage. Recommend  follow-up adrenal protocol CT or MR.  8.  Enlarged prostate.  This report was finalized on 04/05/2023 09:19 by Dr. Jacoby Oscar MD.        Results for orders placed during the hospital encounter of 04/04/23    Adult Transthoracic Echo Complete W/ Cont if Necessary Per Protocol    Interpretation Summary  •  Left ventricular ejection fraction appears to be 56 - 60%.  •  Left ventricular diastolic function was indeterminate.  •  Left atrial volume is mildly increased.  •  Estimated right ventricular systolic pressure from tricuspid regurgitation is normal (<35 mmHg).  •  There is a large (>2cm) pericardial effusion.  •  Unclear if tamponade. No detailed interrogation performed. No clearcut RV or RA compression      Scheduled medications:  amLODIPine, 5 mg, Oral, Daily  busPIRone, 10 mg, Oral, BID  cholecalciferol, 1,000 Units, Oral, Daily  colchicine, 0.6 mg, Oral, BID  docusate sodium, 100 mg, Oral, Daily  ferrous sulfate, 325 mg, Oral, Daily With Breakfast  furosemide, 40 mg, Oral, BID  heparin (porcine), 5,000 Units, Subcutaneous, Q8H  mirtazapine, 15 mg, Oral, Nightly  multivitamin with minerals, 1 tablet, Oral, Daily  sertraline, 200 mg, Oral, Daily  sotalol, 80 mg, Oral, BID  terazosin, 5 mg, Oral, Q12H      I have reviewed the patient's current medications.     Assessment/Plan   Assessment  Active Hospital Problems     Diagnosis    • **Pericardial effusion    • Hypercapnia    • Essential hypertension    • Suspected obstructive sleep apnea with hypercapnia and probable obesity hypoventilation    • Obesity, morbid, BMI 50 or higher    • Paroxysmal atrial fibrillation    • Stage 3a chronic kidney disease      Treatment Plan  Large pericardial effusion greater than 2 cm per echocardiogram 4/5/2023.  Cardiology, Dr. Eid and CTS Dr. Yip consulted.  Patient received 1 dose of Eliquis on admission and has been on hold since.  Dr. Yip notes patient with ascites and history of BRYAN and does not want to create true pericardial window as if patient developed worsening portal hypertension and ascites this could cause worsening pericardial effusion.  Pericardial effusion should be amendable to percutaneous drainage.  Dr. Gonsalves performed pericardiocentesis 4/7/23 with 1150 cc bloody fluid removed.  Fluid sent for for cytologies and cultures.  Patient had an additional 600 mL drainage.  Discussed with MARGUERITE Murphy cardiology today and will resume heparin.  Hold Eliquis until pericardial tube removed. Follow-up echocardiogram today.    Paroxysmal atrial fibrillation.  Patient has been normal sinus rhythm and converted back to atrial for fibrillation 76 yesterday.  Continue sotalol.  Eliquis on hold until pericardial tube removed..  Heparin every 8 hours.    Stage IIIa chronic kidney disease.  Creatinine 1.4 on admit.  Creatinine 0.73 today.  BMP in AM.    Suspected obstructive sleep apnea with hypercapnia and probably component of obesity hypoventilation.  Wife indicates patient prescribed BiPAP and oxygen and [facility] to be [facility].  However, BiPAP and oxygen not yet delivered.   contacting [facility] and confirming device.  Pulmonary medicine consulted and discussed with Dr. Espinoza who notes hypercapnia and suspects FELIX with obesity hypoventilation.  If [facility] requires sleep study then can confirm and get arranged.  Continue BiPAP  at night while hospitalized.  [facility]up with the [facility] in [place] or pulmonary medicine [place].  No additional pulmonary testing recommended at this time. CMNs or any other paperwork involved with arranging for sleep testing or ordering noninvasive respiratory support devices should be routed to his [facility] doctors or other local doctor who will be managing this going forward    Hypercapnia.  ABGs 4/6/23 pH 7.29, PCO2 71 on 3 L.  Placed on BiPAP.  Pulmonary medicine consulted.    Primary hypertension.  Blood pressure 126/74.  Amlodipine continued.    Morbid obesity with BMI 51.92.  Consult dietitian    Consult physical therapy.    Medical Decision Making  Number and Complexity of problems: 6  Large pericardial effusion: Acute, high complexity  Paroxysmal atrial fibrillation: Chronic, moderate complexity  Stage IIIa chronic kidney disease: Chronic, moderate complexity  Suspected obstructive sleep apnea: New diagnosis, moderate complexity.  Primary hypertension: Chronic, low complexity  Morbid obesity: Chronic, low complexity    Differential Diagnosis: Malignant pericardial effusion    Conditions and Status        Condition is unchanged.     [facility] Data  External documents reviewed: [facility] transfer records  Cardiac tracing (EKG, telemetry) interpretation: Normal sinus rhythm, intermittent atrial flutter per telemetry  Radiology interpretation: Radiology interpretation CT abdomen, reviewed echo  Labs reviewed:   CMP 4/8/2023  CBC  4/8/23    Any tests that were considered but not ordered: None     Decision rules/scores evaluated (example [id number], [place], etc): None     Discussed with: Dr. Spain, Dr. Espinoza pulmonology, patient and wife Roselia.     Care Planning  Shared decision making: Dr. Olga Villareal pulmonology and patient. Patient agreeable to pericardiocentesis tube in place.  Oxygen  Code status and discussions: Full code  The patient surrogate decision maker is his wife, Roselia    [facility]  Social  Determinants of Health that impact treatment or disposition: None  I expect the patient to be discharged to home in 2-3 days.     Electronically signed by MARGUERITE William, 04/08/23, 12:49 CDT.

## 2023-04-08 NOTE — PROGRESS NOTES
RT EQUIPMENT DEVICE RELATED - SKIN ASSESSMENT    RT Medical Equipment/Device:     NIV Mask:  Full-face    size: L    Skin Assessment:      Nose:  Intact    Device Skin Pressure Protection:  Skin-to-device areas padded:  Hydrocolloid nasal strips on bridge of nose    Nurse Notification:  Jacquie Macias, RRT

## 2023-04-08 NOTE — PROGRESS NOTES
[facility] -  Progress Note     LOS: 4 days   Patient Care Team:  Provider, No Known as PCP - General    Chief Complaint: Follow-up shortness of breath    Subjective     Interval History:   Upon my examination patient is resting comfortably in bed.  He says that his shortness of breath is improved since having the fluid drained.  His pericardial drain is still in place.  He initially had 1200 mL out and is reported that he had 600 mL out since the drain was placed.  He denies any chest pain, palpitations, presyncope, or syncope.  He is still swollen in his legs is wondering how long this is going to go away.        Review of Systems:     Review of Systems   Respiratory: Negative.    Cardiovascular: Positive for leg swelling. Negative for chest pain and palpitations.     Objective     Vital Sign Min/Max for last 24 hours  Temp  Min: 97.2 °F (36.2 °C)  Max: 97.7 °F (36.5 °C)   BP  Min: 86/50  Max: 138/99   Pulse  Min: 66  Max: 102   Resp  Min: 17  Max: 20   SpO2  Min: 86 %  Max: 99 %   No data recorded   Weight  Min: 124 kg (273 lb 6.4 oz)  Max: 124 kg (273 lb 6.4 oz)         04/07/23 2045   Weight: 124 kg (273 lb 6.4 oz)       Telemetry: Sinus rhythm converted to atrial flutter overnight.  Rates controlled in the 60s and 70s.      Physical Exam:    Constitutional:       Appearance: Healthy appearance. Not in distress.   Neck:      Vascular: JVD normal.   Pulmonary:      Effort: Pulmonary effort is normal.      Breath sounds: Normal breath sounds. No wheezing. No rhonchi. No rales.   Cardiovascular:      PMI at left midclavicular line. Normal rate. Regular rhythm. Normal S1. Normal S2.      Murmurs: There is no murmur.      No gallop. No click. No rub.   Edema:     Peripheral edema present.     Ankle: bilateral 2+ pitting edema of the ankle.     Feet: bilateral 2+ pitting edema of the feet.  Musculoskeletal: Normal range of motion.      Cervical back: Normal range of motion. Skin:     General:  Skin is warm and dry.   Neurological:      Mental Status: Alert and oriented to person, place and time.       Results Review:   Lab Results (last 72 hours)     Procedure Component Value Units Date/Time    AFB Culture - Body Fluid, Pericardium [[id number]] Collected: 04/07/23 1253    Specimen: Body Fluid from Pericardium Updated: 04/08/23 0841     AFB Stain No acid fast bacilli seen on direct smear      No acid fast bacilli seen on concentrated smear    CBC & Differential [[id number]]  (Abnormal) Collected: 04/08/23 0514    Specimen: Blood Updated: 04/08/23 0624    Narrative:      The following orders were created for panel order CBC & Differential.  Procedure                               Abnormality         Status                     ---------                               -----------         ------                     CBC Auto Differential[[id number]]        Abnormal            Final result                 Please view results for these tests on the individual orders.    CBC Auto Differential [[id number]]  (Abnormal) Collected: 04/08/23 0514    Specimen: Blood Updated: 04/08/23 0624     WBC 5.94 10*3/mm3      RBC 3.99 10*6/mm3      Hemoglobin 11.3 g/dL      Hematocrit 38.7 %      MCV 97.0 fL      MCH 28.3 pg      MCHC 29.2 g/dL      RDW 14.1 %      RDW-SD 50.2 fl      MPV 11.9 fL      Platelets 96 10*3/mm3      Neutrophil % 83.0 %      Lymphocyte % 8.1 %      Monocyte % 5.7 %      Eosinophil % 2.4 %      Basophil % 0.3 %      Neutrophils, Absolute 4.93 10*3/mm3      Lymphocytes, Absolute 0.48 10*3/mm3      Monocytes, Absolute 0.34 10*3/mm3      Eosinophils, Absolute 0.14 10*3/mm3      Basophils, Absolute 0.02 10*3/mm3     Narrative:      RIMMED AND RERUN CKD DELTA    Body Fluid Culture - Body Fluid, Pericardium [[id number]] Collected: 04/07/23 1253    Specimen: Body Fluid from Pericardium Updated: 04/07/23 1552     Gram Stain Moderate (3+) WBCs seen      No organisms seen    Body Fluid Cell Count With Differential -  Body Fluid, Pericardium [[id number]]  (Abnormal) Collected: 04/07/23 1253    Specimen: Body Fluid from Pericardium Updated: 04/07/23 1412    Narrative:      The following orders were created for panel order Body Fluid Cell Count With Differential - Body Fluid, Pericardium.  Procedure                               Abnormality         Status                     ---------                               -----------         ------                     Body fluid cell count - ...[[id number]]  Abnormal            Final result               Body fluid differential ...[[id number]]                      Final result                 Please view results for these tests on the individual orders.    Body fluid differential - Body Fluid, Pericardium [[id number]] Collected: 04/07/23 1253    Specimen: Body Fluid from Pericardium Updated: 04/07/23 1412     Neutrophils, Fluid 39 %      Lymphocytes, Fluid 25 %      Monocytes, Fluid 21 %      Eosinophils, Fluid 1 %      Unclassified Cells, Fluid 14 %     Non-gynecologic Cytology [[id number]] Collected: 04/07/23 1253    Specimen: Body Fluid from Pericardium Updated: 04/07/23 1337    Body fluid cell count - Body Fluid, Pericardium [[id number]]  (Abnormal) Collected: 04/07/23 1253    Specimen: Body Fluid from Pericardium Updated: 04/07/23 1334     Color, Fluid Brown     Appearance, Fluid Bloody     RBC, Fluid 1,665,000 /mm3      Nucleated Cells, Fluid 2,478 /mm3     Albumin, Fluid - Pericardial Fluid, Pericardium [[id number]] Collected: 04/07/23 1253    Specimen: Pericardial Fluid from Pericardium Updated: 04/07/23 1259    Protein, Body Fluid - Pericardial Fluid, Pericardium [[id number]] Collected: 04/07/23 1253    Specimen: Pericardial Fluid from Pericardium Updated: 04/07/23 1259    Basic Metabolic Panel [[id number]]  (Abnormal) Collected: 04/07/23 1009    Specimen: Blood Updated: 04/07/23 1055     Glucose 109 mg/dL      BUN 42 mg/dL      Creatinine 1.05 mg/dL      Sodium 141 mmol/L      Potassium  4.1 mmol/L      Comment: Slight hemolysis detected by analyzer. Results may be affected.        Chloride 100 mmol/L      CO2 34.0 mmol/L      Calcium 8.9 mg/dL      BUN/Creatinine Ratio 40.0     Anion Gap 7.0 mmol/L      eGFR 75.0 mL/min/1.73     Narrative:      GFR Normal >60  Chronic Kidney Disease <60  Kidney Failure <15    The GFR formula is only valid for adults with stable renal function between ages 18 and 70.    CBC & Differential [[id number]]  (Abnormal) Collected: 04/07/23 0638    Specimen: Blood Updated: 04/07/23 0705    Narrative:      The following orders were created for panel order CBC & Differential.  Procedure                               Abnormality         Status                     ---------                               -----------         ------                     CBC Auto Differential[[id number]]        Abnormal            Final result                 Please view results for these tests on the individual orders.    CBC Auto Differential [[id number]]  (Abnormal) Collected: 04/07/23 0638    Specimen: Blood Updated: 04/07/23 0705     WBC 13.49 10*3/mm3      RBC 4.68 10*6/mm3      Hemoglobin 13.2 g/dL      Hematocrit 43.4 %      MCV 92.7 fL      MCH 28.2 pg      MCHC 30.4 g/dL      RDW 14.9 %      RDW-SD 50.9 fl      MPV 11.5 fL      Platelets 128 10*3/mm3      Neutrophil % 81.9 %      Lymphocyte % 7.3 %      Monocyte % 6.6 %      Eosinophil % 2.4 %      Basophil % 0.4 %      Immature Grans % 1.4 %      Neutrophils, Absolute 11.06 10*3/mm3      Lymphocytes, Absolute 0.98 10*3/mm3      Monocytes, Absolute 0.89 10*3/mm3      Eosinophils, Absolute 0.32 10*3/mm3      Basophils, Absolute 0.05 10*3/mm3      Immature Grans, Absolute 0.19 10*3/mm3      nRBC 0.0 /100 WBC     Narrative:      CKD    Blood Gas, Arterial - [[id number]]  (Abnormal) Collected: 04/06/23 1710    Specimen: Arterial Blood Updated: 04/06/23 1739     Site Right Radial     Bryant's Test Positive     pH, Arterial 7.312 pH units       Comment: 84 Value below reference range        pCO2, Arterial 69.2 mm Hg      Comment: 86 Value above critical limit        pO2, Arterial 79.9 mm Hg      Comment: 84 Value below reference range        HCO3, Arterial 34.9 mmol/L      Comment: 83 Value above reference range        Base Excess, Arterial 6.5 mmol/L      Comment: 83 Value above reference range        O2 Saturation, Arterial 96.3 %      Temperature 37.0 C      Barometric Pressure for Blood Gas 759 mmHg      Modality BiPap     FIO2 30 %      Comment: Appended report. These results have been appended to a previously final verified report.        Ventilator Mode NA     Set [facility] Resp Rate 12.0     IPAP 16     Comment: Meter: [id number]     :  [id number]        EPAP 10     Notified [facility] [id number]     Notified By [id number]     Notified Time 04/06/2023 17:12     Collected by [id number]     pCO2, Temperature Corrected 69.2 mm Hg      pH, Temp Corrected 7.312 pH Units      pO2, Temperature Corrected 79.9 mm Hg     Blood Gas, Arterial - [[id number]]  (Abnormal) Collected: 04/06/23 1547    Specimen: Arterial Blood Updated: 04/06/23 1547     Site Right Radial     Bryant's Test Positive     pH, Arterial 7.297 pH units      Comment: 84 Value below reference range        pCO2, Arterial 71.2 mm Hg      Comment: 86 Value above critical limit        pO2, Arterial 79.8 mm Hg      Comment: 84 Value below reference range        HCO3, Arterial 34.8 mmol/L      Comment: 83 Value above reference range        Base Excess, Arterial 6.1 mmol/L      Comment: 83 Value above reference range        O2 Saturation, Arterial 95.8 %      Temperature 37.0 C      Barometric Pressure for Blood Gas 759 mmHg      Modality Nasal Cannula     Flow Rate 3.0 lpm      Ventilator Mode NA     Notified [facility] [id number]     Notified By [id number]     Notified Time 04/06/2023 15:49     Collected by [id number]     Comment: Meter: [id number]     :  [id number]        pCO2, Temperature Corrected 71.2 mm Hg       pH, Temp Corrected 7.297 pH Units      pO2, Temperature Corrected 79.8 mm Hg     Comprehensive Metabolic Panel [[id number]]  (Abnormal) Collected: 04/06/23 0954    Specimen: Blood Updated: 04/06/23 1027     Glucose 186 mg/dL      BUN 47 mg/dL      Creatinine 1.27 mg/dL      Sodium 143 mmol/L      Potassium 4.1 mmol/L      Chloride 99 mmol/L      CO2 36.0 mmol/L      Calcium 8.9 mg/dL      Total Protein 6.7 g/dL      Albumin 4.2 g/dL      ALT (SGPT) 31 U/L      AST (SGOT) 17 U/L      Alkaline Phosphatase 67 U/L      Total Bilirubin 0.5 mg/dL      Globulin 2.5 gm/dL      A/G Ratio 1.7 g/dL      BUN/Creatinine Ratio 37.0     Anion Gap 8.0 mmol/L      eGFR 59.7 mL/min/1.73     Narrative:      GFR Normal >60  Chronic Kidney Disease <60  Kidney Failure <15    The GFR formula is only valid for adults with stable renal function between ages 18 and 70.    CBC & Differential [[id number]]  (Abnormal) Collected: 04/06/23 0834    Specimen: Blood Updated: 04/06/23 0907    Narrative:      The following orders were created for panel order CBC & Differential.  Procedure                               Abnormality         Status                     ---------                               -----------         ------                     CBC Auto Differential[[id number]]        Abnormal            Final result                 Please view results for these tests on the individual orders.    CBC Auto Differential [[id number]]  (Abnormal) Collected: 04/06/23 0834    Specimen: Blood Updated: 04/06/23 0907     WBC 11.47 10*3/mm3      RBC 4.60 10*6/mm3      Hemoglobin 13.4 g/dL      Hematocrit 44.6 %      MCV 97.0 fL      MCH 29.1 pg      MCHC 30.0 g/dL      RDW 14.6 %      RDW-SD 51.8 fl      MPV 11.7 fL      Platelets 166 10*3/mm3      Neutrophil % 82.9 %      Lymphocyte % 7.7 %      Monocyte % 6.7 %      Eosinophil % 1.7 %      Basophil % 0.3 %      Immature Grans % 0.7 %      Neutrophils, Absolute 9.51 10*3/mm3      Lymphocytes, Absolute 0.88  10*3/mm3      Monocytes, Absolute 0.77 10*3/mm3      Eosinophils, Absolute 0.20 10*3/mm3      Basophils, Absolute 0.03 10*3/mm3      Immature Grans, Absolute 0.08 10*3/mm3      nRBC 0.0 /100 WBC                  Medication Review: yes  Current Facility-Administered Medications   Medication Dose Route Frequency Provider Last Rate Last Admin   • amLODIPine (NORVASC) tablet 5 mg  5 mg Oral Daily Chago Gonsalves MD   5 mg at 04/08/23 0932   • busPIRone (BUSPAR) tablet 10 mg  10 mg Oral BID Chago Gonsalves MD   10 mg at 04/08/23 0932   • cholecalciferol (VITAMIN D3) tablet 1,000 Units  1,000 Units Oral Daily Chago Gonsalves MD   1,000 Units at 04/08/23 0932   • colchicine tablet 0.6 mg  0.6 mg Oral BID Chago Gonsalves MD   0.6 mg at 04/08/23 0932   • docusate sodium (COLACE) capsule 100 mg  100 mg Oral Daily Chago Gonsalves MD   100 mg at 04/07/23 1558   • ferrous sulfate tablet 325 mg  325 mg Oral Daily With Breakfast Chago Gonsalves MD   325 mg at 04/08/23 0932   • furosemide (LASIX) tablet 40 mg  40 mg Oral BID Chago Gonsalves MD   40 mg at 04/08/23 0932   • heparin (porcine) 5000 UNIT/ML injection 5,000 Units  5,000 Units Subcutaneous Q8H Carlos Nguyen APRN       • mirtazapine (REMERON) tablet 15 mg  15 mg Oral Nightly Chago Gonsalves MD   15 mg at 04/07/23 2055   • multivitamin with minerals 1 tablet  1 tablet Oral Daily Chago Gonsalves MD   1 tablet at 04/08/23 0932   • sertraline (ZOLOFT) tablet 200 mg  200 mg Oral Daily Chago Gonsalves MD   200 mg at 04/08/23 0932   • sodium chloride 0.9 % infusion  75 mL/hr Intravenous Continuous Chago Gonsalves MD 75 mL/hr at 04/06/23 0908 75 mL/hr at 04/06/23 0908   • sotalol (BETAPACE) tablet 80 mg  80 mg Oral BID Chago Gonsalves MD   80 mg at 04/08/23 0932   • terazosin (HYTRIN) capsule 5 mg  5 mg Oral Q12H Chago Gonsalves MD   5 mg at 04/08/23 0932   • traMADol (ULTRAM) tablet 50 mg  50 mg Oral BID PRN Chago Gonsalves MD             Assessment & Plan       Pericardial effusion: 1800  mL total out of drain so far.  Continue drain today.  -Restart VTE prophylaxis with heparin every 8 hours  -Continue diuretic therapy  -Repeat echocardiogram to be completed today    Hypertension: Normotensive blood pressures overnight    Paroxysmal atrial flutter: It appears patient went into atrial flutter overnight.  -Restarting heparin today.  -Rate controlled.  -We will resume Eliquis once drain removed.        Electronically signed by MARGUERITE Pina, 04/08/23, 10:51 AM CDT.

## 2023-04-08 NOTE — PROGRESS NOTES
[facility] [place] [facility]ULMONARY AND CRITICAL CARE PROGRESS NOTE - [facility]    Patient: Norm Tracey  1950   MR# [id number]   Acct# [id number]  04/08/23   10:54 CDT  Referring Provider: Ivis Spain    Chief Complaint: hypercapnia   Interval history: he used the hospital bipap machine again overnight last night.  He had pericardiocentesis yesterday uneventfully.  He feels he benefited from the bipap  Meds:  amLODIPine, 5 mg, Oral, Daily  busPIRone, 10 mg, Oral, BID  cholecalciferol, 1,000 Units, Oral, Daily  colchicine, 0.6 mg, Oral, BID  docusate sodium, 100 mg, Oral, Daily  ferrous sulfate, 325 mg, Oral, Daily With Breakfast  furosemide, 40 mg, Oral, BID  heparin (porcine), 5,000 Units, Subcutaneous, Q8H  mirtazapine, 15 mg, Oral, Nightly  multivitamin with minerals, 1 tablet, Oral, Daily  sertraline, 200 mg, Oral, Daily  sotalol, 80 mg, Oral, BID  terazosin, 5 mg, Oral, Q12H      sodium chloride, 75 mL/hr, Last Rate: 75 mL/hr (04/06/23 0908)      Physical Exam:  SpO2 Percentage    04/08/23 0418 04/08/23 0709 04/08/23 1000   SpO2: 91% 92% 92%     Body mass index is 51.66 kg/m².   Temp:  [97.2 °F (36.2 °C)-97.7 °F (36.5 °C)] 97.3 °F (36.3 °C)  Heart Rate:  [] 91  Resp:  [17-20] 20  BP: ()/() 101/54    Intake/Output Summary (Last 24 hours) at 4/8/2023 1054  Last data filed at 4/8/2023 0600  Gross per 24 hour   Intake 240 ml   Output 950 ml   Net -710 ml     Physical Exam  Constitutional:       Appearance: He is ill-appearing. He is not diaphoretic.   Eyes:      Extraocular Movements: Extraocular movements intact.   Pulmonary:      Effort: Pulmonary effort is normal. No respiratory distress.      Breath sounds: No wheezing, rhonchi or rales.   Abdominal:      Comments: obese   Skin:     Findings: No erythema or rash.   Neurological:      Mental Status: He is alert.       Laboratory Data:  Results from last 7 days   Lab Units 04/08/23  0514 04/07/23  0638 04/06/23  0834   WBC 10*3/mm3  5.94 13.49* 11.47*   HEMOGLOBIN g/dL 11.3* 13.2 13.4   PLATELETS 10*3/mm3 96* 128* 166     Results from last 7 days   Lab Units 04/07/23  1009 04/06/23  0954 04/05/23  0005   SODIUM mmol/L 141 143 145   POTASSIUM mmol/L 4.1 4.1 3.8   BUN mg/dL 42* 47* 42*   CREATININE mg/dL 1.05 1.27 1.40*     Results from last 7 days   Lab Units 04/06/23  1710 04/06/23  1547   PH, ARTERIAL pH units 7.312* 7.297*   PCO2, ARTERIAL mm Hg 69.2* 71.2*   PO2 ART mm Hg 79.9* 79.8*   FIO2 % 30  --      Microbiology Results (last 10 days)     Procedure Component Value - Date/Time    Body Fluid Culture - Body Fluid, Pericardium [[id number]] Collected: 04/07/23 1253    Lab Status: Preliminary result Specimen: Body Fluid from Pericardium Updated: 04/07/23 1552     Gram Stain Moderate (3+) WBCs seen      No organisms seen    AFB Culture - Body Fluid, Pericardium [[id number]] Collected: 04/07/23 1253    Lab Status: Preliminary result Specimen: Body Fluid from Pericardium Updated: 04/08/23 0841     AFB Stain No acid fast bacilli seen on direct smear      No acid fast bacilli seen on concentrated smear        Recent films:  No radiology results for the last day    Pulmonary Assessment:  1. Hypercapnia with obesity and apparent witnessed apneas at referring facility. Suspect ofelia/obesity hypoventilation syndrome  2. Pericardial effusion and pleural effusion and paroxysmal atrial fibrillation  3. Hypoxemia compensated on minimal oxygen (1 lpm)    Recommend:   · [facility] for how [facility] handle the hypercapnic resp failure in setting of witnessed apneas and absence of underlying lung disease  · Pericardial drainage per cardiology/ct surgery  · Follow up imaging will be helpful to reassess pleural effusion after other acute issues are resolved, and this is deferred to his doctors who will be seeing him in follow up.  · No additional pulmonary testing recommended currently  · Anticipate follow up on the above problems with [facility] the  [facility].  I will be available for follow up if needed.  CMNs or any other paperwork involved with arranging for sleep testing or ordering noninvasive respiratory support devices should be routed to his [facility] doctors or other local doctor who will be managing this going forward  · Will sign off.  Will remain available, and I will be glad to see him back in the office, which is 69 miles away from his home, should he be unable to find capable providers closer to home.    Electronically signed by Matthew Espinoza MD, 04/08/23, 10:54 CDT

## 2023-04-08 NOTE — THERAPY TREATMENT NOTE
Acute Care - Physical Therapy Treatment Note  [facility]     Patient Name: Norm Tracey  : 1950  MRN: [id number]  Today's Date: 2023      Visit Dx:     ICD-10-CM ICD-9-CM   1. Pericardial effusion  I31.39 423.9   2. Impaired mobility  Z74.09 799.89     Patient Active Problem List   Diagnosis   • Pericardial effusion   • Essential hypertension   • Suspected obstructive sleep apnea with hypercapnia and probable obesity hypoventilation   • Obesity, morbid, BMI 50 or higher   • Paroxysmal atrial fibrillation   • Stage 3a chronic kidney disease   • Hypercapnia     Past Medical History:   Diagnosis Date   • Atrial fibrillation with rapid ventricular response    • Essential hypertension    • Obesity due to excess calories with serious comorbidity    • Pneumonia    • Respiratory failure    • Sleep apnea      History reviewed. No pertinent surgical history.  PT Assessment (last 12 hours)     PT Evaluation and Treatment     Row Name 23 1441          Physical Therapy Time and Intention    Subjective Information no complaints  -WK     Document Type therapy note (daily note)  -WK     Mode of Treatment physical therapy  -WK     Row Name 23 1441          General Information    Existing Precautions/Restrictions fall;oxygen therapy device and L/min  1l, pericardialcentesis bag  -WK     Row Name 23 1441          Bed Mobility    Supine-Sit Bryan (Bed Mobility) verbal cues;contact guard;minimum assist (75% patient effort)  -WK     Row Name 23 1441          Sit-Stand Transfer    Sit-Stand Bryan (Transfers) verbal cues;contact guard  -WK     Row Name 23 1441          Gait/Stairs (Locomotion)    Bryan Level (Gait) verbal cues;minimum assist (75% patient effort);contact guard  -WK     Distance in Feet (Gait) 120  10' in BR  -WK     Comment, (Gait/Stairs) unsteady at times with cues for safety.  -WK     Row Name 23 1441          Plan of Care Review    Plan of Care Reviewed  With patient  -WK     Progress improving  -WK     Outcome Evaluation Pt amb 120' CGA-min A and was mild unsteady. Pt required cues for safety during gait and in BR.  -WK     Row Name 04/08/23 1500          Positioning and Restraints    Pre-Treatment Position in bed  -WK     Post Treatment Position bed  -WK     In Bed sitting EOB;call light within reach;encouraged to call for assist;with family/caregiver  -WK           User Key  (r) = Recorded By, (t) = Taken By, (c) = Cosigned By    Initials Name Provider Type    WK Josefa Espinoza PTA Physical Therapist Assistant                Physical Therapy Education     Title: PT OT SLP Therapies (In Progress)     Topic: Physical Therapy (In Progress)     Point: Mobility training (Done)     Learning Progress Summary           Patient Acceptance, E, VU by MARSHALL at 4/7/2023 0910    Comment: Pt was educated on POC, benefits of activity, and safety with transfers and gait.                   Point: Home exercise program (Not Started)     Learner Progress:  Not documented in this visit.          Point: Body mechanics (Not Started)     Learner Progress:  Not documented in this visit.          Point: Precautions (Not Started)     Learner Progress:  Not documented in this visit.                      User Key     Initials Effective Dates Name Provider Type Discipline    MARSHALL 01/03/23 -  Pamela Tinajero, PT Student PT Student PT              PT Recommendation and Plan     Plan of Care Reviewed With: patient  Progress: improving  Outcome Evaluation: Pt amb 120' CGA-min A and was mild unsteady. Pt required cues for safety during gait and in BR.   Outcome Measures     Row Name 04/08/23 1441             How much help from another person do you currently need...    Turning from your back to your side while in flat bed without using bedrails? 3  -WK      Moving from lying on back to sitting on the side of a flat bed without bedrails? 3  -WK      Moving to and from a bed to a chair (including a  wheelchair)? 3  -WK      Standing up from a chair using your arms (e.g., wheelchair, bedside chair)? 3  -WK      Climbing 3-5 steps with a railing? 3  -WK      To walk in hospital room? 3  -WK      AM-PAC 6 Clicks Score (PT) 18  -WK         Functional Assessment    Outcome Measure Options AM-PAC 6 Clicks Basic Mobility (PT)  -WK            User Key  (r) = Recorded By, (t) = Taken By, (c) = Cosigned By    Initials Name Provider Type    WK Josefa Espinoza PTA Physical Therapist Assistant                 Time Calculation:    PT Charges     Row Name 04/08/23 1502             Time Calculation    Start Time 1441  -WK      Stop Time 1456  -WK      Time Calculation (min) 15 min  -WK      PT Received On 04/08/23  -WK         Time Calculation- PT    Total Timed Code Minutes- PT 15 minute(s)  -WK            User Key  (r) = Recorded By, (t) = Taken By, (c) = Cosigned By    Initials Name Provider Type    WK Josefa Espinoza PTA Physical Therapist Assistant              Therapy Charges for Today     Code Description Service Date Service Provider Modifiers Qty    [id number] HC GAIT TRAINING EA 15 MIN 4/8/2023 Josefa Espinoza PTA GP 1          PT G-Codes  Outcome Measure Options: AM-PAC 6 Clicks Basic Mobility (PT)  AM-PAC 6 Clicks Score (PT): 18    Josefa Espinoza PTA  4/8/2023

## 2023-04-08 NOTE — PLAN OF CARE
Problem: Adult Inpatient Plan of Care  Goal: Plan of Care Review    Progress: improving  Plan of Care Reviewed With: patient  Outcome Evaluation: Pt amb 120' CGA-min A and was mild unsteady. Pt required cues for safety during gait and in BR.

## 2023-04-08 NOTE — PLAN OF CARE
Goal Outcome Evaluation:  Plan of Care Reviewed With: patient        Progress: improving  Outcome Evaluation: S/p pericariocentesis. AOx4. VSS. AF 76-99 on tele. 2L NC; Bipap HS. No c/o pain. L chest drain with serosangenous fluid. Safety maintained.

## 2023-04-09 LAB
ANION GAP SERPL CALCULATED.3IONS-SCNC: 5 MMOL/L (ref 5–15)
BASOPHILS # BLD AUTO: 0.01 10*3/MM3 (ref 0–0.2)
BASOPHILS NFR BLD AUTO: 0.2 % (ref 0–1.5)
BUN SERPL-MCNC: 19 MG/DL (ref 8–23)
BUN/CREAT SERPL: 25 (ref 7–25)
CALCIUM SPEC-SCNC: 8.4 MG/DL (ref 8.6–10.5)
CHLORIDE SERPL-SCNC: 101 MMOL/L (ref 98–107)
CO2 SERPL-SCNC: 40 MMOL/L (ref 22–29)
CREAT SERPL-MCNC: 0.76 MG/DL (ref 0.76–1.27)
DEPRECATED RDW RBC AUTO: 48.1 FL (ref 37–54)
EGFRCR SERPLBLD CKD-EPI 2021: 94.9 ML/MIN/1.73
EOSINOPHIL # BLD AUTO: 0.13 10*3/MM3 (ref 0–0.4)
EOSINOPHIL NFR BLD AUTO: 3 % (ref 0.3–6.2)
ERYTHROCYTE [DISTWIDTH] IN BLOOD BY AUTOMATED COUNT: 13.9 % (ref 12.3–15.4)
GLUCOSE SERPL-MCNC: 110 MG/DL (ref 65–99)
HCT VFR BLD AUTO: 35.9 % (ref 37.5–51)
HGB BLD-MCNC: 10.9 G/DL (ref 13–17.7)
LYMPHOCYTES # BLD AUTO: 0.43 10*3/MM3 (ref 0.7–3.1)
LYMPHOCYTES NFR BLD AUTO: 9.9 % (ref 19.6–45.3)
MCH RBC QN AUTO: 28.8 PG (ref 26.6–33)
MCHC RBC AUTO-ENTMCNC: 30.4 G/DL (ref 31.5–35.7)
MCV RBC AUTO: 95 FL (ref 79–97)
MONOCYTES # BLD AUTO: 0.23 10*3/MM3 (ref 0.1–0.9)
MONOCYTES NFR BLD AUTO: 5.3 % (ref 5–12)
NEUTROPHILS NFR BLD AUTO: 3.51 10*3/MM3 (ref 1.7–7)
NEUTROPHILS NFR BLD AUTO: 81.1 % (ref 42.7–76)
PLATELET # BLD AUTO: 83 10*3/MM3 (ref 140–450)
PMV BLD AUTO: 11.6 FL (ref 6–12)
POTASSIUM SERPL-SCNC: 3.6 MMOL/L (ref 3.5–5.2)
RBC # BLD AUTO: 3.78 10*6/MM3 (ref 4.14–5.8)
SODIUM SERPL-SCNC: 146 MMOL/L (ref 136–145)
WBC NRBC COR # BLD: 4.33 10*3/MM3 (ref 3.4–10.8)

## 2023-04-09 PROCEDURE — 85025 COMPLETE CBC W/AUTO DIFF WBC: CPT | Performed by: INTERNAL MEDICINE

## 2023-04-09 PROCEDURE — 94799 UNLISTED PULMONARY SVC/PX: CPT

## 2023-04-09 PROCEDURE — 94660 CPAP INITIATION&MGMT: CPT

## 2023-04-09 PROCEDURE — 99232 SBSQ HOSP IP/OBS MODERATE 35: CPT

## 2023-04-09 PROCEDURE — 97116 GAIT TRAINING THERAPY: CPT

## 2023-04-09 PROCEDURE — 25010000002 HEPARIN (PORCINE) PER 1000 UNITS

## 2023-04-09 PROCEDURE — 80048 BASIC METABOLIC PNL TOTAL CA: CPT | Performed by: HOSPITALIST

## 2023-04-09 RX ADMIN — FUROSEMIDE 40 MG: 40 TABLET ORAL at 08:19

## 2023-04-09 RX ADMIN — MIRTAZAPINE 15 MG: 15 TABLET, FILM COATED ORAL at 21:12

## 2023-04-09 RX ADMIN — COLCHICINE 0.6 MG: 0.6 TABLET ORAL at 08:19

## 2023-04-09 RX ADMIN — SERTRALINE HYDROCHLORIDE 200 MG: 100 TABLET, FILM COATED ORAL at 08:19

## 2023-04-09 RX ADMIN — HEPARIN SODIUM 5000 UNITS: 5000 INJECTION INTRAVENOUS; SUBCUTANEOUS at 21:12

## 2023-04-09 RX ADMIN — Medication 1 TABLET: at 08:19

## 2023-04-09 RX ADMIN — COLCHICINE 0.6 MG: 0.6 TABLET ORAL at 21:11

## 2023-04-09 RX ADMIN — HEPARIN SODIUM 5000 UNITS: 5000 INJECTION INTRAVENOUS; SUBCUTANEOUS at 05:26

## 2023-04-09 RX ADMIN — SOTALOL HYDROCHLORIDE 80 MG: 80 TABLET ORAL at 08:21

## 2023-04-09 RX ADMIN — FUROSEMIDE 40 MG: 40 TABLET ORAL at 17:52

## 2023-04-09 RX ADMIN — TERAZOSIN HYDROCHLORIDE 5 MG: 5 CAPSULE ORAL at 08:21

## 2023-04-09 RX ADMIN — SOTALOL HYDROCHLORIDE 80 MG: 80 TABLET ORAL at 21:11

## 2023-04-09 RX ADMIN — FERROUS SULFATE TAB 325 MG (65 MG ELEMENTAL FE) 325 MG: 325 (65 FE) TAB at 08:21

## 2023-04-09 RX ADMIN — BUSPIRONE HYDROCHLORIDE 10 MG: 10 TABLET ORAL at 21:11

## 2023-04-09 RX ADMIN — BUSPIRONE HYDROCHLORIDE 10 MG: 10 TABLET ORAL at 08:21

## 2023-04-09 RX ADMIN — DOCUSATE SODIUM 100 MG: 100 CAPSULE ORAL at 08:21

## 2023-04-09 RX ADMIN — Medication 1000 UNITS: at 08:19

## 2023-04-09 RX ADMIN — HEPARIN SODIUM 5000 UNITS: 5000 INJECTION INTRAVENOUS; SUBCUTANEOUS at 15:55

## 2023-04-09 RX ADMIN — AMLODIPINE BESYLATE 5 MG: 5 TABLET ORAL at 08:21

## 2023-04-09 RX ADMIN — TERAZOSIN HYDROCHLORIDE 5 MG: 5 CAPSULE ORAL at 21:11

## 2023-04-09 NOTE — THERAPY TREATMENT NOTE
Acute Care - Physical Therapy Treatment Note  [facility]     Patient Name: Norm Tracey  : 1950  MRN: [id number]  Today's Date: 2023      Visit Dx:     ICD-10-CM ICD-9-CM   1. Pericardial effusion  I31.39 423.9   2. Impaired mobility  Z74.09 799.89     Patient Active Problem List   Diagnosis   • Pericardial effusion   • Essential hypertension   • Suspected obstructive sleep apnea with hypercapnia and probable obesity hypoventilation   • Obesity, morbid, BMI 50 or higher   • Paroxysmal atrial fibrillation   • Stage 3a chronic kidney disease   • Hypercapnia     Past Medical History:   Diagnosis Date   • Atrial fibrillation with rapid ventricular response    • Essential hypertension    • Obesity due to excess calories with serious comorbidity    • Pneumonia    • Respiratory failure    • Sleep apnea      History reviewed. No pertinent surgical history.  PT Assessment (last 12 hours)     PT Evaluation and Treatment     Row Name 23 1431 23 1312       Physical Therapy Time and Intention    Subjective Information no complaints  -WK --    Document Type therapy note (daily note)  -WK --    Mode of Treatment physical therapy  -WK --    Session Not Performed -- other (see comments)  -WK    Comment -- pt still eating lunch  -WK    Row Name 23 1431          General Information    Existing Precautions/Restrictions fall  pericardialcentesis bag  -WK     Row Name 23 1431          Bed Mobility    Supine-Sit Enochs (Bed Mobility) verbal cues;contact guard  -WK     Row Name 23 1431          Sit-Stand Transfer    Sit-Stand Enochs (Transfers) verbal cues;contact guard  -WK     Row Name 23 1431          Gait/Stairs (Locomotion)    Enochs Level (Gait) verbal cues;contact guard;minimum assist (75% patient effort)  -WK     Distance in Feet (Gait) 140  -WK     Deviations/Abnormal Patterns (Gait) base of support, wide  -WK     Comment, (Gait/Stairs) mild unsteadiness \  -WK      Row Name 04/09/23 1431          Balance    Comment, Balance static standing to urinate CGA  -WK     Row Name 04/09/23 1431          Plan of Care Review    Plan of Care Reviewed With patient  -WK     Progress improving  -WK     Outcome Evaluation Pt amb 140' CGA with mild unsteadiness. Balance sightly improved from yesterday. Pt o2 was 90% on RA while amb.  -WK     Row Name 04/09/23 1431          Positioning and Restraints    Pre-Treatment Position in bed  -WK     Post Treatment Position bed  -WK     In Bed fowlers;call light within reach;encouraged to call for assist  -WK           User Key  (r) = Recorded By, (t) = Taken By, (c) = Cosigned By    Initials Name Provider Type    WK Josefa Espinoza PTA Physical Therapist Assistant                Physical Therapy Education     Title: PT OT SLP Therapies (In Progress)     Topic: Physical Therapy (In Progress)     Point: Mobility training (Done)     Learning Progress Summary           Patient Acceptance, E, VU by MARSHALL at 4/7/2023 0930    Comment: Pt was educated on POC, benefits of activity, and safety with transfers and gait.                   Point: Home exercise program (Not Started)     Learner Progress:  Not documented in this visit.          Point: Body mechanics (Not Started)     Learner Progress:  Not documented in this visit.          Point: Precautions (Not Started)     Learner Progress:  Not documented in this visit.                      User Key     Initials Effective Dates Name Provider Type Ana OLIVARES 01/03/23 -  Pamela Tinajero, PT Student PT Student PT              PT Recommendation and Plan     Plan of Care Reviewed With: patient  Progress: improving  Outcome Evaluation: Pt amb 140' CGA with mild unsteadiness. Balance sightly improved from yesterday. Pt o2 was 90% on RA while amb.   Outcome Measures     Row Name 04/08/23 1441             How much help from another person do you currently need...    Turning from your back to your side while in flat  bed without using bedrails? 3  -WK      Moving from lying on back to sitting on the side of a flat bed without bedrails? 3  -WK      Moving to and from a bed to a chair (including a wheelchair)? 3  -WK      Standing up from a chair using your arms (e.g., wheelchair, bedside chair)? 3  -WK      Climbing 3-5 steps with a railing? 3  -WK      To walk in hospital room? 3  -WK      AM-PAC 6 Clicks Score (PT) 18  -WK         Functional Assessment    Outcome Measure Options AM-PAC 6 Clicks Basic Mobility (PT)  -WK            User Key  (r) = Recorded By, (t) = Taken By, (c) = Cosigned By    Initials Name Provider Type    WK Josefa Espinoza PTA Physical Therapist Assistant                 Time Calculation:    PT Charges     Row Name 04/09/23 1446             Time Calculation    Start Time 1431  -WK      Stop Time 1442  -WK      Time Calculation (min) 11 min  -WK      PT Received On 04/09/23  -WK         Time Calculation- PT    Total Timed Code Minutes- PT 11 minute(s)  -WK            User Key  (r) = Recorded By, (t) = Taken By, (c) = Cosigned By    Initials Name Provider Type    WK Josefa Espinoza PTA Physical Therapist Assistant              Therapy Charges for Today     Code Description Service Date Service Provider Modifiers Qty    12774927611 HC GAIT TRAINING EA 15 MIN 4/8/2023 Josefa Espinoza PTA GP 1    13297771820 HC GAIT TRAINING EA 15 MIN 4/9/2023 Josefa Espinoza PTA GP 1          PT G-Codes  Outcome Measure Options: AM-PAC 6 Clicks Basic Mobility (PT)  AM-PAC 6 Clicks Score (PT): 18    Josefa Espinoza PTA  4/9/2023

## 2023-04-09 NOTE — PROGRESS NOTES
[facility] -  Progress Note     LOS: 5 days   Patient Care Team:  Provider, No Known as PCP - General    Chief Complaint: Follow-up shortness of breath    Subjective     Interval History:   Upon my examination the patient is resting comfortably in bed.  He says that his shortness of breath is improved even compared to yesterday.  He says he is able to get up and go to the bathroom with some exertional dyspnea but is improved.  He denies any chest pain, palpitation, presyncope, or syncope.  He is wondering when the drains can be removed.  He believes the swelling in his legs is somewhat improved.      Review of Systems:     Review of Systems   Respiratory:        PAGE   Cardiovascular: Positive for leg swelling.     Objective     Vital Sign Min/Max for last 24 hours  Temp  Min: 97.4 °F (36.3 °C)  Max: 97.9 °F (36.6 °C)   BP  Min: 103/54  Max: 126/69   Pulse  Min: 67  Max: 89   Resp  Min: 16  Max: 20   SpO2  Min: 92 %  Max: 97 %   No data recorded   Weight  Min: 123 kg (271 lb 9.6 oz)  Max: 123 kg (271 lb 9.6 oz)         04/08/23 2040   Weight: 123 kg (271 lb 9.6 oz)       Telemetry: Normal sinus rhythm with rates in the 60s and 70s      Physical Exam:    Constitutional:       Appearance: Healthy appearance. Not in distress.   Neck:      Vascular: JVD normal.   Pulmonary:      Effort: Pulmonary effort is normal.      Breath sounds: Normal breath sounds. No wheezing. No rhonchi. No rales.   Cardiovascular:      PMI at left midclavicular line. Normal rate. Regular rhythm. Normal S1. Normal S2.      Murmurs: There is no murmur.      No gallop. No click. No rub.   Edema:     Peripheral edema present.     Ankle: bilateral 2+ edema of the ankle.     Feet: bilateral 2+ edema of the feet.  Musculoskeletal: Normal range of motion.      Cervical back: Normal range of motion. Skin:     General: Skin is warm and dry.   Neurological:      Mental Status: Alert and oriented to person, place and time.        Results Review:   Lab Results (last 72 hours)     Procedure Component Value Units Date/Time    Body Fluid Culture - Body Fluid, Pericardium [[id number]] Collected: 04/07/23 1253    Specimen: Body Fluid from Pericardium Updated: 04/09/23 0911     Body Fluid Culture No growth at 2 days     Gram Stain Moderate (3+) WBCs seen      No organisms seen    Basic Metabolic Panel [[id number]]  (Abnormal) Collected: 04/09/23 0458    Specimen: Blood Updated: 04/09/23 0550     Glucose 110 mg/dL      BUN 19 mg/dL      Creatinine 0.76 mg/dL      Sodium 146 mmol/L      Potassium 3.6 mmol/L      Chloride 101 mmol/L      CO2 40.0 mmol/L      Calcium 8.4 mg/dL      BUN/Creatinine Ratio 25.0     Anion Gap 5.0 mmol/L      eGFR 94.9 mL/min/1.73     Narrative:      GFR Normal >60  Chronic Kidney Disease <60  Kidney Failure <15    The GFR formula is only valid for adults with stable renal function between ages 18 and 70.    CBC & Differential [[id number]]  (Abnormal) Collected: 04/09/23 0458    Specimen: Blood Updated: 04/09/23 0547    Narrative:      The following orders were created for panel order CBC & Differential.  Procedure                               Abnormality         Status                     ---------                               -----------         ------                     CBC Auto Differential[[id number]]        Abnormal            Final result                 Please view results for these tests on the individual orders.    CBC Auto Differential [[id number]]  (Abnormal) Collected: 04/09/23 0458    Specimen: Blood Updated: 04/09/23 0547     WBC 4.33 10*3/mm3      RBC 3.78 10*6/mm3      Hemoglobin 10.9 g/dL      Hematocrit 35.9 %      MCV 95.0 fL      MCH 28.8 pg      MCHC 30.4 g/dL      RDW 13.9 %      RDW-SD 48.1 fl      MPV 11.6 fL      Platelets 83 10*3/mm3      Neutrophil % 81.1 %      Lymphocyte % 9.9 %      Monocyte % 5.3 %      Eosinophil % 3.0 %      Basophil % 0.2 %      Neutrophils, Absolute 3.51 10*3/mm3       Lymphocytes, Absolute 0.43 10*3/mm3      Monocytes, Absolute 0.23 10*3/mm3      Eosinophils, Absolute 0.13 10*3/mm3      Basophils, Absolute 0.01 10*3/mm3     Basic Metabolic Panel [[id number]]  (Abnormal) Collected: 04/08/23 1120    Specimen: Blood Updated: 04/08/23 1145     Glucose 126 mg/dL      BUN 26 mg/dL      Creatinine 0.73 mg/dL      Sodium 144 mmol/L      Potassium 3.9 mmol/L      Chloride 104 mmol/L      CO2 34.0 mmol/L      Calcium 8.5 mg/dL      BUN/Creatinine Ratio 35.6     Anion Gap 6.0 mmol/L      eGFR 96.1 mL/min/1.73     Narrative:      GFR Normal >60  Chronic Kidney Disease <60  Kidney Failure <15    The GFR formula is only valid for adults with stable renal function between ages 18 and 70.    Protein, Body Fluid - Pericardial Fluid, Pericardium [[id number]] Collected: 04/07/23 1253    Specimen: Pericardial Fluid from Pericardium Updated: 04/08/23 1111     Protein, Fluid 5.7 g/dL      Comment:              _________________________________________              : BODY FLUID TYPE :     TOTAL PROTEIN     :              :_________________:_______________________:              : Amniotic Fluid  :               <0.4    :              :_________________:_______________________:              :                 : Nonmalignant: <3.0    :              : Ascitic Fluid   : Malignant:    >3.0    :              :_________________:_______________________:              : Bile, Clear     :               <0.9    :              :_________________:_______________________:              : Bile, Yellow    :          0.2 - 0.6    :              :_________________:_______________________:              : Lymph           :          2.2 - 6.0    :              :_________________:_______________________:              : Human Milk      :          1.9 - 2.0    :              :_________________: ______________________:              : Nasal Secretion :          0.1 - 3.5    :              :_________________:_______________________:               : Pancreatic      :          0.0 - 0.1    :              : Juice           :    (post stimulation) :              :_________________:_______________________:              :                 : Transudate:   <0.3    :              : Pleural Fluid   : Exudate:      >0.3    :              :_________________:_______________________:              : Saliva          :          0.1 - 0.2    :              : (Mixed Glands)  :                       :              :_________________:_______________________:              : Synovial Fluid  :               <2.5    :              :_________________:_______________________:              : Tears           :          0.8 - 0.9    :              :_________________:_______________________:               Hernan Doe. Reference Intervals               for  Adults and Children 2008. Ninth               Edition (V9.1) [employer],               [facility]; Nicholas: July 2009.       Narrative:      Performed at:  [address]  [id number]  : Nicholas Null PhD, Phone:  [phone]    Albumin, Fluid - Pericardial Fluid, Pericardium [[id number]] Collected: 04/07/23 1253    Specimen: Pericardial Fluid from Pericardium Updated: 04/08/23 1111     Albumin, Fluid 3.6 g/dL      Comment: The reference interval(s) and other method performance specifications  have not been established for this body fluid. The test result must be  integrated into the clinical context for interpretation.       Narrative:      Performed at:  [address]  [id number]  : Nicholas Null PhD, Phone:  [phone]    AFB Culture - Body Fluid, Pericardium [[id number]] Collected: 04/07/23 1253    Specimen: Body Fluid from Pericardium Updated: 04/08/23 0841     AFB Stain No acid fast bacilli seen on direct smear      No acid fast bacilli seen on concentrated smear    CBC & Differential [[id number]]   (Abnormal) Collected: 04/08/23 0514    Specimen: Blood Updated: 04/08/23 0624    Narrative:      The following orders were created for panel order CBC & Differential.  Procedure                               Abnormality         Status                     ---------                               -----------         ------                     CBC Auto Differential[[id number]]        Abnormal            Final result                 Please view results for these tests on the individual orders.    CBC Auto Differential [[id number]]  (Abnormal) Collected: 04/08/23 0514    Specimen: Blood Updated: 04/08/23 0624     WBC 5.94 10*3/mm3      RBC 3.99 10*6/mm3      Hemoglobin 11.3 g/dL      Hematocrit 38.7 %      MCV 97.0 fL      MCH 28.3 pg      MCHC 29.2 g/dL      RDW 14.1 %      RDW-SD 50.2 fl      MPV 11.9 fL      Platelets 96 10*3/mm3      Neutrophil % 83.0 %      Lymphocyte % 8.1 %      Monocyte % 5.7 %      Eosinophil % 2.4 %      Basophil % 0.3 %      Neutrophils, Absolute 4.93 10*3/mm3      Lymphocytes, Absolute 0.48 10*3/mm3      Monocytes, Absolute 0.34 10*3/mm3      Eosinophils, Absolute 0.14 10*3/mm3      Basophils, Absolute 0.02 10*3/mm3     Narrative:      RIMMED AND RERUN CKD DELTA    Body Fluid Cell Count With Differential - Body Fluid, Pericardium [[id number]]  (Abnormal) Collected: 04/07/23 1253    Specimen: Body Fluid from Pericardium Updated: 04/07/23 1412    Narrative:      The following orders were created for panel order Body Fluid Cell Count With Differential - Body Fluid, Pericardium.  Procedure                               Abnormality         Status                     ---------                               -----------         ------                     Body fluid cell count - ...[[id number]]  Abnormal            Final result               Body fluid differential ...[[id number]]                      Final result                 Please view results for these tests on the individual orders.    Body fluid  differential - Body Fluid, Pericardium [[id number]] Collected: 04/07/23 1253    Specimen: Body Fluid from Pericardium Updated: 04/07/23 1412     Neutrophils, Fluid 39 %      Lymphocytes, Fluid 25 %      Monocytes, Fluid 21 %      Eosinophils, Fluid 1 %      Unclassified Cells, Fluid 14 %     Non-gynecologic Cytology [[id number]] Collected: 04/07/23 1253    Specimen: Body Fluid from Pericardium Updated: 04/07/23 1337    Body fluid cell count - Body Fluid, Pericardium [[id number]]  (Abnormal) Collected: 04/07/23 1253    Specimen: Body Fluid from Pericardium Updated: 04/07/23 1334     Color, Fluid Brown     Appearance, Fluid Bloody     RBC, Fluid 1,665,000 /mm3      Nucleated Cells, Fluid 2,478 /mm3     Basic Metabolic Panel [[id number]]  (Abnormal) Collected: 04/07/23 1009    Specimen: Blood Updated: 04/07/23 1055     Glucose 109 mg/dL      BUN 42 mg/dL      Creatinine 1.05 mg/dL      Sodium 141 mmol/L      Potassium 4.1 mmol/L      Comment: Slight hemolysis detected by analyzer. Results may be affected.        Chloride 100 mmol/L      CO2 34.0 mmol/L      Calcium 8.9 mg/dL      BUN/Creatinine Ratio 40.0     Anion Gap 7.0 mmol/L      eGFR 75.0 mL/min/1.73     Narrative:      GFR Normal >60  Chronic Kidney Disease <60  Kidney Failure <15    The GFR formula is only valid for adults with stable renal function between ages 18 and 70.    CBC & Differential [[id number]]  (Abnormal) Collected: 04/07/23 0638    Specimen: Blood Updated: 04/07/23 0705    Narrative:      The following orders were created for panel order CBC & Differential.  Procedure                               Abnormality         Status                     ---------                               -----------         ------                     CBC Auto Differential[[id number]]        Abnormal            Final result                 Please view results for these tests on the individual orders.    CBC Auto Differential [[id number]]  (Abnormal) Collected: 04/07/23 0638     Specimen: Blood Updated: 04/07/23 0705     WBC 13.49 10*3/mm3      RBC 4.68 10*6/mm3      Hemoglobin 13.2 g/dL      Hematocrit 43.4 %      MCV 92.7 fL      MCH 28.2 pg      MCHC 30.4 g/dL      RDW 14.9 %      RDW-SD 50.9 fl      MPV 11.5 fL      Platelets 128 10*3/mm3      Neutrophil % 81.9 %      Lymphocyte % 7.3 %      Monocyte % 6.6 %      Eosinophil % 2.4 %      Basophil % 0.4 %      Immature Grans % 1.4 %      Neutrophils, Absolute 11.06 10*3/mm3      Lymphocytes, Absolute 0.98 10*3/mm3      Monocytes, Absolute 0.89 10*3/mm3      Eosinophils, Absolute 0.32 10*3/mm3      Basophils, Absolute 0.05 10*3/mm3      Immature Grans, Absolute 0.19 10*3/mm3      nRBC 0.0 /100 WBC     Narrative:      CKD    Blood Gas, Arterial - [[id number]]  (Abnormal) Collected: 04/06/23 1710    Specimen: Arterial Blood Updated: 04/06/23 1739     Site Right Radial     Bryant's Test Positive     pH, Arterial 7.312 pH units      Comment: 84 Value below reference range        pCO2, Arterial 69.2 mm Hg      Comment: 86 Value above critical limit        pO2, Arterial 79.9 mm Hg      Comment: 84 Value below reference range        HCO3, Arterial 34.9 mmol/L      Comment: 83 Value above reference range        Base Excess, Arterial 6.5 mmol/L      Comment: 83 Value above reference range        O2 Saturation, Arterial 96.3 %      Temperature 37.0 C      Barometric Pressure for Blood Gas 759 mmHg      Modality BiPap     FIO2 30 %      Comment: Appended report. These results have been appended to a previously final verified report.        Ventilator Mode NA     Set [facility] Resp Rate 12.0     IPAP 16     Comment: Meter: [id number]     :  [id number]        EPAP 10     Notified [facility] [id number]     Notified By [id number]     Notified Time 04/06/2023 17:12     Collected by [id number]     pCO2, Temperature Corrected 69.2 mm Hg      pH, Temp Corrected 7.312 pH Units      pO2, Temperature Corrected 79.9 mm Hg     Blood Gas, Arterial - [[id number]]  (Abnormal)  Collected: 04/06/23 1547    Specimen: Arterial Blood Updated: 04/06/23 1547     Site Right Radial     Bryant's Test Positive     pH, Arterial 7.297 pH units      Comment: 84 Value below reference range        pCO2, Arterial 71.2 mm Hg      Comment: 86 Value above critical limit        pO2, Arterial 79.8 mm Hg      Comment: 84 Value below reference range        HCO3, Arterial 34.8 mmol/L      Comment: 83 Value above reference range        Base Excess, Arterial 6.1 mmol/L      Comment: 83 Value above reference range        O2 Saturation, Arterial 95.8 %      Temperature 37.0 C      Barometric Pressure for Blood Gas 759 mmHg      Modality Nasal Cannula     Flow Rate 3.0 lpm      Ventilator Mode NA     Notified [facility] [id number]     Notified By [id number]     Notified Time 04/06/2023 15:49     Collected by [id number]     Comment: Meter: [id number]     :  [id number]        pCO2, Temperature Corrected 71.2 mm Hg      pH, Temp Corrected 7.297 pH Units      pO2, Temperature Corrected 79.8 mm Hg               Echo EF Estimated  No results found for: ECHOEFEST      Cath Ejection Fraction Quantitative  No results found for: CATHEF        Medication Review: yes  Current Facility-Administered Medications   Medication Dose Route Frequency Provider Last Rate Last Admin   • amLODIPine (NORVASC) tablet 5 mg  5 mg Oral Daily Chago Gonsalves MD   5 mg at 04/09/23 0821   • busPIRone (BUSPAR) tablet 10 mg  10 mg Oral BID Chago Gonsalves MD   10 mg at 04/09/23 0821   • cholecalciferol (VITAMIN D3) tablet 1,000 Units  1,000 Units Oral Daily Chago Gonsalves MD   1,000 Units at 04/09/23 0819   • colchicine tablet 0.6 mg  0.6 mg Oral BID Chago Gonsalves MD   0.6 mg at 04/09/23 0819   • docusate sodium (COLACE) capsule 100 mg  100 mg Oral Daily Chago Gonsalves MD   100 mg at 04/09/23 0821   • ferrous sulfate tablet 325 mg  325 mg Oral Daily With Breakfast Chago Gonsalves MD   325 mg at 04/09/23 0821   • furosemide (LASIX) tablet 40 mg  40 mg Oral BID  Chago Gonsalves MD   40 mg at 04/09/23 0819   • heparin (porcine) 5000 UNIT/ML injection 5,000 Units  5,000 Units Subcutaneous Q8H Carlos Nguyen APRN   5,000 Units at 04/09/23 0526   • mirtazapine (REMERON) tablet 15 mg  15 mg Oral Nightly Chago Gonsalves MD   15 mg at 04/08/23 2105   • multivitamin with minerals 1 tablet  1 tablet Oral Daily Chago Gonsalves MD   1 tablet at 04/09/23 0819   • sertraline (ZOLOFT) tablet 200 mg  200 mg Oral Daily Chago Gonsalves MD   200 mg at 04/09/23 0819   • sotalol (BETAPACE) tablet 80 mg  80 mg Oral BID Chago Gonsalves MD   80 mg at 04/09/23 0821   • terazosin (HYTRIN) capsule 5 mg  5 mg Oral Q12H Chago Gonsalves MD   5 mg at 04/09/23 0821   • traMADol (ULTRAM) tablet 50 mg  50 mg Oral BID PRN Chago Gonsalves MD             Assessment & Plan       Pericardial effusion: Drainage still appears to be bloody but is decreasing in amount  -Plans are to remove drain tomorrow if drainage is decreased.  -Continue heparin with plans to transition to Eliquis after drain removal  -Continue diuretic therapy    Hypertension: Normotensive readings.  Continue current therapy.    Paroxysmal atrial flutter: It appears patient converted to normal sinus rhythm around 1954 last night.  Patient's rates have been running 60s and 70s.  -On heparin with plans to transition to Eliquis once drain removed      Further orders per Dr. Holder      Electronically signed by MARGUERITE Pina, 04/09/23, 12:28 PM CDT.

## 2023-04-09 NOTE — PROGRESS NOTES
[facility] Medicine Services  INPATIENT PROGRESS NOTE    Patient Name: Norm Tracey  Date of Admission: 4/4/2023  Today's Date: 04/09/23  Length of Stay: 5  Primary Care Physician: Provider, No Known    Subjective   Chief Complaint: Follow-up shortness of breath  HPI   Mr. Tracey transferred from [facility] 4/4/23 with worsening shortness of breath, atrial fibrillation with RVR on sotalol and Eliquis, and CT scan showing large pericardial effusion.  Patient treated for pericarditis as outpatient by Dr. Soria and was originally referred to him for new onset atrial fibrillation and found to have pericardial effusion.  He was started on colchicine and Lasix.  Pericardial effusion has worsened over the last few weeks associate with increasing shortness of breath.  Patient has a history of morbid obesity, hypertension, Combs, new diagnosis of obstructive sleep apnea.  Etiology of pericardial effusion unknown.  Patient undergoing work-up for hip surgery and found to have pericardial effusion and atrial fibrillation during work-up.  He represented to [facility] with worsening shortness of breath noted to be in A-fib with RVR.  He was started on a Cardizem drip and placed on BiPAP.  CT scan of the chest reported large pericardial effusion with bilateral pleural effusions but no tamponade.  Discussed with CT surgery Dr. Yip accepted patient in transfer.    Sitting up in bed.  Oxygen at 1 L.  Saturation 96%.  Wife in room.  Patient reports he continues to breathe better.  Will attempt to wean oxygen and ambulate on room air today.  He still has edema in his feet and I raised the foot of his bed.  No complaints of chest pain, palpitations.  He denies nausea, vomiting or abdominal pain.  Patient wore BiPAP for 5 hours during the night.  Pericardial tube remains in place with serosanguineous drainage 100 mL past 24 hours.  Discussed with MARGUERITE Murphy today and  would like pericardial drainage less than 50 mL 24 hours prior to discontinuing tube.  Patient remains on heparin and will resume Eliquis once pericardial tube removed.  Social service assisting with determining when [facility] is recently [facility].    Review of Systems   Constitutional: Positive for activity change. Negative for chills, fatigue and fever.   HENT: Negative for congestion and trouble swallowing.    Eyes: Negative for photophobia and visual disturbance.   Respiratory: Positive for shortness of breath (Improved after pericardiocentesis). Negative for wheezing.    Cardiovascular: Negative for chest pain and palpitations.   Gastrointestinal: Positive for abdominal distention. Negative for constipation, diarrhea, nausea and vomiting.   Endocrine: Negative for cold intolerance, heat intolerance and polyuria.   Genitourinary: Negative for dysuria, frequency and urgency.   Musculoskeletal: Negative for back pain.   Skin: Negative for color change, pallor, rash and wound.   Allergic/Immunologic: Negative for immunocompromised state.   Neurological: Positive for weakness. Negative for light-headedness.   Hematological: Negative for adenopathy. Does not bruise/bleed easily.   Psychiatric/Behavioral: Negative for agitation, behavioral problems and confusion.      All pertinent negatives and positives are as above. All other systems have been reviewed and are negative unless otherwise stated.     Objective    Temp:  [97.4 °F (36.3 °C)-97.9 °F (36.6 °C)] 97.4 °F (36.3 °C)  Heart Rate:  [67-89] 73  Resp:  [16-20] 16  BP: (103-126)/(54-74) 103/54  Physical Exam  Vitals and nursing note reviewed.   Constitutional:       Appearance: He is obese.      Comments: Eating up in bed.  Oxygen at 1 L.  Saturation 96%.    HENT:      Head: Normocephalic and atraumatic.      Nose: No congestion.      Mouth/Throat:      Pharynx: Oropharynx is clear.   Cardiovascular:      Rate and Rhythm: Normal rate and regular  rhythm.      Heart sounds: No murmur heard.     Comments: Normal sinus rhythm 80 on telemetry.  Pulmonary:      Breath sounds: No wheezing, rhonchi or rales.      Comments: Oxygen at 1 L.  Saturation 96%.  Pericardial tube left anterior chest with serosanguineous drainage.  Abdominal:      General: There is distension.      Palpations: Abdomen is soft.   Genitourinary:     Comments: Voiding.  Musculoskeletal:         General: Swelling (Feet bilaterally) present.   Skin:     General: Skin is warm and dry.   Neurological:      General: No focal deficit present.      Mental Status: He is alert and oriented to person, place, and time.   Psychiatric:         Mood and Affect: Mood normal.         Behavior: Behavior normal.         Thought Content: Thought content normal.         Judgment: Judgment normal.       Results Review:  I have reviewed the labs, radiology results, and diagnostic studies.    Laboratory Data:   Results from last 7 days   Lab Units 04/09/23 0458 04/08/23  0514 04/07/23  0638   WBC 10*3/mm3 4.33 5.94 13.49*   HEMOGLOBIN g/dL 10.9* 11.3* 13.2   HEMATOCRIT % 35.9* 38.7 43.4   PLATELETS 10*3/mm3 83* 96* 128*      Results from last 7 days   Lab Units 04/09/23  0458 04/08/23  1120 04/07/23  1009 04/06/23  0954   SODIUM mmol/L 146* 144 141 143   POTASSIUM mmol/L 3.6 3.9 4.1 4.1   CHLORIDE mmol/L 101 104 100 99   CO2 mmol/L 40.0* 34.0* 34.0* 36.0*   BUN mg/dL 19 26* 42* 47*   CREATININE mg/dL 0.76 0.73* 1.05 1.27   CALCIUM mg/dL 8.4* 8.5* 8.9 8.9   BILIRUBIN mg/dL  --   --   --  0.5   ALK PHOS U/L  --   --   --  67   ALT (SGPT) U/L  --   --   --  31   AST (SGOT) U/L  --   --   --  17   GLUCOSE mg/dL 110* 126* 109* 186*     Imaging Results (All)     Procedure Component Value Units Date/Time    CT Chest Without Contrast Diagnostic [[id number]] Collected: 04/05/23 0913     Updated: 04/05/23 0932    Narrative:      EXAMINATION: CT CHEST WO CONTRAST DIAGNOSTIC-      4/5/2023 8:48 AM CDT     HISTORY: pericardial  effusion     In order to have a CT radiation dose as low as reasonably achievable  Automated Exposure Control was utilized for adjustment of the mA and/or  KV according to patient size.     DLP in mGycm= 969     The CT scan of the chest is performed without intravenous contrast  enhancement.     Images are acquired in axial plane and subsequent reconstruction in  coronal and sagittal planes.     There is no previous similar study for comparison.     There is significant respiratory motion artifacts which is noted  diagnostic quality of the study. This may obscure a subtle  nodule/lesion.     The lungs are poorly expanded.     There is a moderate right basal pleural effusion and a trace left basal  pleural effusion.     There is a right lower lobar consolidation adjacent to the pleural  effusion which may suggest compression atelectasis. Mild atelectatic  changes and pleural thickening is seen at the left base posteriorly.     Visualized central airway is patent. The distal airway is poorly  visualized and evaluated due to respiratory motion artifacts. No  significant endobronchial abnormality or a lesion.     The limited visualized soft tissues of the neck are unremarkable.  Probable low-density nodule in the left lobe of the thyroid gland. There  is no axillary lymphadenopathy.     Atheromatous changes of the thoracic aorta noted. No aneurysmal  dilatation. The pulmonary arteries are of normal size and shape. The  coronary arteries are not optimally visualized or evaluated. No  significant calcification the course of the coronary arteries.     There is a large pericardial effusion which measures up to 3.2 cm in  width.     There is a large Bochdalek hernia with peritoneal fat and infiltration.     The limited visualized unenhanced abdomen show small amount of fluid in  the upper abdomen around the liver and spleen. Exophytic nodule/masses  are seen in the limited visualized kidneys. The gallbladder is small  and  contracted. No radiopaque calculi are noted. Pancreas and kidneys are  incompletely included and not evaluated. The abdomen is separately  reviewed and reported with same day CT scan of the abdomen     Images reviewed in bone windows show chronic degenerative changes of the  thoracic spine with a mild levoscoliosis. No acute bony abnormality.  There is accentuated thoracic kyphosis. Hardware fusion of the lower  cervical spine is visualized. No hardware complication. Old healed  fractures of the left ribs are noted.       Impression:      1. A large pericardial effusion.  2. A moderate right and a trace left pleural effusion.  3. Right lower lobar consolidation/compression atelectasis.   This report was finalized on 04/05/2023 09:29 by Dr. Carly Espino MD.    CT Abdomen Pelvis Without Contrast [[id number]] Collected: 04/05/23 0909     Updated: 04/05/23 0922    Narrative:      EXAM/TECHNIQUE: CT abdomen pelvis without contrast     INDICATION: Lymphadenopathy, groin     COMPARISON: None     DLP: 969 mGy cm. Automated exposure control was also utilized to  decrease patient radiation dose.     FINDINGS:     Large pericardial effusion measuring up to 3 cm in thickness. Moderate  RIGHT pleural effusion with atelectasis.     Unenhanced liver appears unremarkable. Gallbladder is decompressed. No  gallstones or biliary ductal dilatation.      Pancreas, spleen, and RIGHT adrenal gland are unremarkable.  Indeterminate 4.0 x 2.3 cm LEFT adrenal gland nodule.      Multiple bilateral low-density renal lesions compatible with cysts. No  renal or ureteral calculi. No hydronephrosis. 1.7 cm calculus layering  in the posterior midline urinary bladder. Small locules of gas in the  urinary bladder lumen are noted. No focal urinary bladder wall  thickening.     Colonic diverticulosis without evidence of diverticulitis. No colonic  wall thickening or pericolonic fat stranding. No abnormal small bowel  distention or evidence of  active small bowel inflammation. No  pneumatosis or evidence of pneumoperitoneum.     Small volume ascites throughout the abdomen and pelvis. No pelvic mass  organized pelvic collection. Prostate is enlarged measuring 5 cm  transverse dimension. Nonaneurysmal atherosclerotic abdominal aorta. No  enlarged abdominal or pelvic lymph nodes. Moderate to large right-sided  hydrocele.     Diffuse body wall soft tissue edema. Severe osteoarthritis in the LEFT  hip. Grade 1 anterolisthesis of L4 on L5.       Impression:         1.  Large pericardial effusion. Close clinical follow-up is recommended.  2.  Moderate RIGHT pleural effusion with overlying atelectasis.  3.  Small volume ascites throughout the abdomen and pelvis.  4.  No abdominal or pelvic lymphadenopathy.  5.  Moderate to large right-sided hydrocele.  6.  1.7 cm calculus in the urinary bladder.  7.  Indeterminate 4 cm LEFT adrenal gland nodule. Differential includes  adrenal gland mass as well as focal adrenal gland hemorrhage. Recommend  follow-up adrenal protocol CT or MR.  8.  Enlarged prostate.  This report was finalized on 04/05/2023 09:19 by Dr. Jacoby Oscar MD.        Results for orders placed during the hospital encounter of 04/04/23    Adult Transthoracic Echo Limited W/ Cont if Necessary Per Protocol    Interpretation Summary  •  There is a trivial pericardial effusion that is significantly reduced in size compared to the prior study from 4/5/23 s/p pericardial drain.  •  There is thickening of the pericardium present. 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  •  Left ventricular systolic function is normal.  •  The right ventricle is not well visualized but appears probably in size with low normal systolic function.      Scheduled medications:  amLODIPine, 5 mg, Oral, Daily  busPIRone, 10 mg, Oral, BID  cholecalciferol, 1,000 Units, Oral, Daily  colchicine, 0.6 mg, Oral, BID  docusate sodium, 100  mg, Oral, Daily  ferrous sulfate, 325 mg, Oral, Daily With Breakfast  furosemide, 40 mg, Oral, BID  heparin (porcine), 5,000 Units, Subcutaneous, Q8H  mirtazapine, 15 mg, Oral, Nightly  multivitamin with minerals, 1 tablet, Oral, Daily  sertraline, 200 mg, Oral, Daily  sotalol, 80 mg, Oral, BID  terazosin, 5 mg, Oral, Q12H      I have reviewed the patient's current medications.     Assessment/Plan   Assessment  Active Hospital Problems    Diagnosis    • **Pericardial effusion    • Hypercapnia    • Essential hypertension    • Suspected obstructive sleep apnea with hypercapnia and probable obesity hypoventilation    • Obesity, morbid, BMI 50 or higher    • Paroxysmal atrial fibrillation    • Stage 3a chronic kidney disease      Treatment Plan  Large pericardial effusion greater than 2 cm per echocardiogram 4/5/2023.  Cardiology, Dr. Eid and CTS Dr. Yip consulted.  Patient received 1 dose of Eliquis on admission and has been on hold since.  Dr. Yip notes patient with ascites and history of BRYAN and does not want to create true pericardial window as if patient developed worsening portal hypertension and ascites this could cause worsening pericardial effusion.  Pericardial effusion should be amendable to percutaneous drainage.  Dr. Gonsalves performed pericardiocentesis 4/7/23 with 1150 cc bloody fluid removed.  Fluid sent for for cytologies and cultures.  Patient had an additional 600 mL drainage and 100 mL past 24 hours.  Discussed with MARGUERITE Murphy cardiology today and would like for pericardial fluid drainage to be less than 50 mL 24 hours prior to removal.  Continue heparin and restart Eliquis once pericardial tube removed.  Follow-up echocardiogram  4/8 notes trivial pericardial effusion and significant decrease in size prior to pericardiocentesis.    Paroxysmal atrial fibrillation.  Patient normal sinus rhythm today.  Atrial fibrillation yesterday.  Continue sotalol and heparin.  Restart Eliquis after  pericardial tube removed.    Stage IIIa chronic kidney disease.  Creatinine 1.4 on admit.  Creatinine 0.76 today.  BMP in AM.    Suspected obstructive sleep apnea with hypercapnia and probably component of obesity hypoventilation.  Wife indicates patient prescribed BiPAP and oxygen and [facility].  However, BiPAP and oxygen not yet delivered.   contacting [facility] and confirming device.  Pulmonary medicine consulted and discussed with Dr. Espinoza who notes hypercapnia and suspects FELIX with obesity hypoventilation.  If [facility] requires sleep study then can confirm and get arranged.  Continue BiPAP at night while hospitalized.  [facility]up with the [facility] in [place] or pulmonary medicine [place].  No additional pulmonary testing recommended at this time. CMNs or any other paperwork involved with arranging for sleep testing or ordering noninvasive respiratory support devices should be routed to his [facility] doctors or other local doctor who will be managing this going forward    Hypercapnia.  ABGs 4/6/23 pH 7.29, PCO2 71 on 3 L.  Placed on BiPAP.  Pulmonary medicine consulted.    Oxygen at 1 L with saturation 96%.  Wean oxygen as tolerated.  Ambulate and monitor saturation.    Primary hypertension.  Blood pressure 103/54.  Amlodipine continued.    Morbid obesity with BMI 51.92.     Consult physical therapy.  Ambulate in watts and monitor saturation.    Medical Decision Making  Number and Complexity of problems: 6  Large pericardial effusion: Acute, high complexity  Paroxysmal atrial fibrillation: Chronic, moderate complexity  Stage IIIa chronic kidney disease: Chronic, moderate complexity  Suspected obstructive sleep apnea: New diagnosis, moderate complexity.  Primary hypertension: Chronic, low complexity  Morbid obesity: Chronic, low complexity    Differential Diagnosis: Malignant pericardial effusion    Conditions and Status        Condition is unchanged.     [facility] Data  External documents reviewed: [facility] transfer records  Cardiac tracing (EKG, telemetry) interpretation: Normal sinus rhythm, intermittent atrial flutter per telemetry  Radiology interpretation: Radiology interpretation CT abdomen, reviewed echo  Labs reviewed:   CMP 4/9/2023  CBC  4/9/23    Any tests that were considered but not ordered: None     Decision rules/scores evaluated (example [id number], Wells, etc): None     Discussed with: Carlos Villareal APRN cardiology patient and wife Roselia.     Care Planning  Shared decision making: Carlos Villareal APRN cardiology and patient. Patient agreeable to pericardiocentesis tube, wean oxygen, ambulate  Code status and discussions: Full code  The patient surrogate decision maker is his wife, Roselia    Disposition  Social Determinants of Health that impact treatment or disposition: None  I expect the patient to be discharged to home in 2 days.     Electronically signed by MARGUERITE William, 04/09/23, 10:44 CDT.

## 2023-04-09 NOTE — PLAN OF CARE
Problem: Adult Inpatient Plan of Care  Goal: Plan of Care Review  Recent Flowsheet Documentation  Taken 4/9/2023 1431 by Josefa Espinoza, PTA  Progress: improving  Plan of Care Reviewed With: patient  Outcome Evaluation: Pt amb 140' CGA with mild unsteadiness. Balance sightly improved from yesterday. Pt o2 was 90% on RA while amb.

## 2023-04-10 LAB
ANION GAP SERPL CALCULATED.3IONS-SCNC: 5 MMOL/L (ref 5–15)
BASOPHILS # BLD AUTO: 0.02 10*3/MM3 (ref 0–0.2)
BASOPHILS NFR BLD AUTO: 0.5 % (ref 0–1.5)
BUN SERPL-MCNC: 13 MG/DL (ref 8–23)
BUN/CREAT SERPL: 21.7 (ref 7–25)
CALCIUM SPEC-SCNC: 8.4 MG/DL (ref 8.6–10.5)
CHLORIDE SERPL-SCNC: 100 MMOL/L (ref 98–107)
CO2 SERPL-SCNC: 40 MMOL/L (ref 22–29)
CREAT SERPL-MCNC: 0.6 MG/DL (ref 0.76–1.27)
DEPRECATED RDW RBC AUTO: 48.1 FL (ref 37–54)
EGFRCR SERPLBLD CKD-EPI 2021: 101.9 ML/MIN/1.73
EOSINOPHIL # BLD AUTO: 0.1 10*3/MM3 (ref 0–0.4)
EOSINOPHIL NFR BLD AUTO: 2.7 % (ref 0.3–6.2)
ERYTHROCYTE [DISTWIDTH] IN BLOOD BY AUTOMATED COUNT: 14 % (ref 12.3–15.4)
GLUCOSE SERPL-MCNC: 101 MG/DL (ref 65–99)
HCT VFR BLD AUTO: 36.4 % (ref 37.5–51)
HGB BLD-MCNC: 10.9 G/DL (ref 13–17.7)
LYMPHOCYTES # BLD AUTO: 0.46 10*3/MM3 (ref 0.7–3.1)
LYMPHOCYTES NFR BLD AUTO: 12.5 % (ref 19.6–45.3)
MAXIMAL PREDICTED HEART RATE: 147 BPM
MCH RBC QN AUTO: 28.1 PG (ref 26.6–33)
MCHC RBC AUTO-ENTMCNC: 29.9 G/DL (ref 31.5–35.7)
MCV RBC AUTO: 93.8 FL (ref 79–97)
MONOCYTES # BLD AUTO: 0.22 10*3/MM3 (ref 0.1–0.9)
MONOCYTES NFR BLD AUTO: 6 % (ref 5–12)
NEUTROPHILS NFR BLD AUTO: 2.85 10*3/MM3 (ref 1.7–7)
NEUTROPHILS NFR BLD AUTO: 77.8 % (ref 42.7–76)
PLATELET # BLD AUTO: 81 10*3/MM3 (ref 140–450)
PMV BLD AUTO: 11.1 FL (ref 6–12)
POTASSIUM SERPL-SCNC: 3.5 MMOL/L (ref 3.5–5.2)
RBC # BLD AUTO: 3.88 10*6/MM3 (ref 4.14–5.8)
SODIUM SERPL-SCNC: 145 MMOL/L (ref 136–145)
STRESS TARGET HR: 125 BPM
WBC NRBC COR # BLD: 3.67 10*3/MM3 (ref 3.4–10.8)

## 2023-04-10 PROCEDURE — 94799 UNLISTED PULMONARY SVC/PX: CPT

## 2023-04-10 PROCEDURE — 80048 BASIC METABOLIC PNL TOTAL CA: CPT | Performed by: HOSPITALIST

## 2023-04-10 PROCEDURE — 94761 N-INVAS EAR/PLS OXIMETRY MLT: CPT

## 2023-04-10 PROCEDURE — 97110 THERAPEUTIC EXERCISES: CPT

## 2023-04-10 PROCEDURE — 99232 SBSQ HOSP IP/OBS MODERATE 35: CPT | Performed by: NURSE PRACTITIONER

## 2023-04-10 PROCEDURE — 97116 GAIT TRAINING THERAPY: CPT

## 2023-04-10 PROCEDURE — 93005 ELECTROCARDIOGRAM TRACING: CPT | Performed by: INTERNAL MEDICINE

## 2023-04-10 PROCEDURE — 85025 COMPLETE CBC W/AUTO DIFF WBC: CPT | Performed by: INTERNAL MEDICINE

## 2023-04-10 PROCEDURE — 25010000002 HEPARIN (PORCINE) PER 1000 UNITS

## 2023-04-10 RX ORDER — POTASSIUM CHLORIDE 750 MG/1
40 CAPSULE, EXTENDED RELEASE ORAL ONCE
Status: COMPLETED | OUTPATIENT
Start: 2023-04-10 | End: 2023-04-10

## 2023-04-10 RX ADMIN — Medication 1 TABLET: at 08:48

## 2023-04-10 RX ADMIN — SOTALOL HYDROCHLORIDE 80 MG: 80 TABLET ORAL at 21:12

## 2023-04-10 RX ADMIN — COLCHICINE 0.6 MG: 0.6 TABLET ORAL at 08:47

## 2023-04-10 RX ADMIN — HEPARIN SODIUM 5000 UNITS: 5000 INJECTION INTRAVENOUS; SUBCUTANEOUS at 06:28

## 2023-04-10 RX ADMIN — HEPARIN SODIUM 5000 UNITS: 5000 INJECTION INTRAVENOUS; SUBCUTANEOUS at 21:11

## 2023-04-10 RX ADMIN — COLCHICINE 0.6 MG: 0.6 TABLET ORAL at 21:11

## 2023-04-10 RX ADMIN — SERTRALINE HYDROCHLORIDE 200 MG: 100 TABLET, FILM COATED ORAL at 08:48

## 2023-04-10 RX ADMIN — FUROSEMIDE 40 MG: 40 TABLET ORAL at 17:54

## 2023-04-10 RX ADMIN — FERROUS SULFATE TAB 325 MG (65 MG ELEMENTAL FE) 325 MG: 325 (65 FE) TAB at 08:48

## 2023-04-10 RX ADMIN — AMLODIPINE BESYLATE 5 MG: 5 TABLET ORAL at 08:48

## 2023-04-10 RX ADMIN — TERAZOSIN HYDROCHLORIDE 5 MG: 5 CAPSULE ORAL at 08:48

## 2023-04-10 RX ADMIN — SOTALOL HYDROCHLORIDE 80 MG: 80 TABLET ORAL at 08:47

## 2023-04-10 RX ADMIN — BUSPIRONE HYDROCHLORIDE 10 MG: 10 TABLET ORAL at 21:12

## 2023-04-10 RX ADMIN — HEPARIN SODIUM 5000 UNITS: 5000 INJECTION INTRAVENOUS; SUBCUTANEOUS at 15:05

## 2023-04-10 RX ADMIN — TERAZOSIN HYDROCHLORIDE 5 MG: 5 CAPSULE ORAL at 21:12

## 2023-04-10 RX ADMIN — FUROSEMIDE 40 MG: 40 TABLET ORAL at 08:48

## 2023-04-10 RX ADMIN — Medication 1000 UNITS: at 08:48

## 2023-04-10 RX ADMIN — MIRTAZAPINE 15 MG: 15 TABLET, FILM COATED ORAL at 21:12

## 2023-04-10 RX ADMIN — POTASSIUM CHLORIDE 40 MEQ: 10 CAPSULE, COATED, EXTENDED RELEASE ORAL at 08:47

## 2023-04-10 RX ADMIN — BUSPIRONE HYDROCHLORIDE 10 MG: 10 TABLET ORAL at 08:48

## 2023-04-10 NOTE — THERAPY TREATMENT NOTE
Acute Care - Physical Therapy Treatment Note  [facility]     Patient Name: Norm Tracey  : 1950  MRN: [id number]  Today's Date: 4/10/2023      Visit Dx:     ICD-10-CM ICD-9-CM   1. Pericardial effusion  I31.39 423.9   2. Impaired mobility  Z74.09 799.89     Patient Active Problem List   Diagnosis   • Pericardial effusion   • Essential hypertension   • Suspected obstructive sleep apnea with hypercapnia and probable obesity hypoventilation   • Obesity, morbid, BMI 50 or higher   • Paroxysmal atrial fibrillation   • Stage 3a chronic kidney disease   • Hypercapnia     Past Medical History:   Diagnosis Date   • Atrial fibrillation with rapid ventricular response    • Essential hypertension    • Obesity due to excess calories with serious comorbidity    • Pneumonia    • Respiratory failure    • Sleep apnea      Past Surgical History:   Procedure Laterality Date   • CARDIAC CATHETERIZATION N/A 2023    Procedure: Pericardiocentesis;  Surgeon: Chago Gonsalves MD;  Location: [facility] INVASIVE LOCATION;  Service: Cardiology;  Laterality: N/A;     PT Assessment (last 12 hours)     PT Evaluation and Treatment     Row Name 04/10/23 0939          Physical Therapy Time and Intention    Subjective Information no complaints  -WK     Document Type therapy note (daily note)  -WK     Mode of Treatment physical therapy  -WK     Row Name 04/10/23 0939          General Information    Existing Precautions/Restrictions fall  pericardialcentesis bag  -WK     Row Name 04/10/23 0939          Bed Mobility    Supine-Sit [place] (Bed Mobility) standby assist  -WK     Row Name 04/10/23 0939          Sit-Stand Transfer    Sit-Stand [place] (Transfers) standby assist  -WK     Row Name 04/10/23 0939          Gait/Stairs (Locomotion)    [place] Level (Gait) verbal cues;contact guard  -WK     Distance in Feet (Gait) 160  -WK     Deviations/Abnormal Patterns (Gait) base of support, wide;gait speed decreased  -WK      [place] Level (Stairs) verbal cues;contact guard  -WK     Handrail Location (Stairs) right side (descending);right side (ascending)  -WK     Number of Steps (Stairs) 2  -WK     Descending Technique (Stairs) step-to-step  -WK     Row Name 04/10/23 0939          Motor Skills    Therapeutic Exercise aerobic  -WK     Row Name 04/10/23 0939          Aerobic Exercise    Comment, Aerobic Exercise (Therapeutic Exercise) BLE AROM 20 reps sitting.  -WK     Row Name 04/10/23 0939          Plan of Care Review    Plan of Care Reviewed With patient  -WK     Progress improving  -WK     Outcome Evaluation Pt amb 160' CGA with mild unsteadiness. At times pt reaches for object but does not require support. Pt performed 2 steps CGA.  -WK     Row Name 04/10/23 0939          Positioning and Restraints    Pre-Treatment Position in bed  -WK     Post Treatment Position bed  -WK     In Bed fowlers;call light within reach;encouraged to call for assist;with family/caregiver  -WK           User Key  (r) = Recorded By, (t) = Taken By, (c) = Cosigned By    Initials Name Provider Type    WK Josefa Espinoza, PTA Physical Therapist Assistant                Physical Therapy Education     Title: PT OT SLP Therapies (In Progress)     Topic: Physical Therapy (In Progress)     Point: Mobility training (Done)     Learning Progress Summary           Patient Acceptance, E, VU by MARSHALL at 4/7/2023 0930    Comment: Pt was educated on POC, benefits of activity, and safety with transfers and gait.                   Point: Home exercise program (Not Started)     Learner Progress:  Not documented in this visit.          Point: Body mechanics (Not Started)     Learner Progress:  Not documented in this visit.          Point: Precautions (Not Started)     Learner Progress:  Not documented in this visit.                      User Key     Initials Effective Dates Name Provider Type Ana OLIVARES 01/03/23 -  Pamela Tinajero, PT Student PT Student PT               PT Recommendation and Plan     Plan of Care Reviewed With: patient  Progress: improving  Outcome Evaluation: Pt amb 160' CGA with mild unsteadiness. At times pt reaches for object but does not require support. Pt performed 2 steps CGA.   Outcome Measures     Row Name 04/08/23 1441             How much help from another person do you currently need...    Turning from your back to your side while in flat bed without using bedrails? 3  -WK      Moving from lying on back to sitting on the side of a flat bed without bedrails? 3  -WK      Moving to and from a bed to a chair (including a wheelchair)? 3  -WK      Standing up from a chair using your arms (e.g., wheelchair, bedside chair)? 3  -WK      Climbing 3-5 steps with a railing? 3  -WK      To walk in hospital room? 3  -WK      AM-PAC 6 Clicks Score (PT) 18  -WK         Functional Assessment    Outcome Measure Options AM-PAC 6 Clicks Basic Mobility (PT)  -WK            User Key  (r) = Recorded By, (t) = Taken By, (c) = Cosigned By    Initials Name Provider Type    WK Josefa Espinoza PTA Physical Therapist Assistant                 Time Calculation:    PT Charges     Row Name 04/10/23 1011             Time Calculation    Start Time 0939  -WK      Stop Time 1007  -WK      Time Calculation (min) 28 min  -WK      PT Received On 04/10/23  -WK         Time Calculation- PT    Total Timed Code Minutes- PT 28 minute(s)  -WK            User Key  (r) = Recorded By, (t) = Taken By, (c) = Cosigned By    Initials Name Provider Type    WK Josefa Espinoza PTA Physical Therapist Assistant              Therapy Charges for Today     Code Description Service Date Service Provider Modifiers Qty    01283152858 HC GAIT TRAINING EA 15 MIN 4/9/2023 Josefa Espinoza PTA GP 1    79240796615 HC GAIT TRAINING EA 15 MIN 4/10/2023 Josefa Espinoza, NATE GP 1    80587256288 HC PT THER PROC EA 15 MIN 4/10/2023 Josefa Espinoza PTA GP 1          PT G-Codes  Outcome Measure Options: AM-PAC 6  Clicks Basic Mobility (PT)  -[facility] 6 Clicks Score (PT): 18    Josefa Espinoza, PTA  4/10/2023

## 2023-04-10 NOTE — PROGRESS NOTES
RT EQUIPMENT DEVICE RELATED - SKIN ASSESSMENT    RT Medical Equipment/Device:     NIV Mask:  Full-face    size: l    Skin Assessment:      Nose:  Intact    Device Skin Pressure Protection:  Skin-to-device areas padded:  Protecta-Gel nasal gel pad    Nurse Notification:  Jacquie Link, RRT

## 2023-04-10 NOTE — PLAN OF CARE
Problem: Adult Inpatient Plan of Care  Goal: Plan of Care Review  Outcome: Ongoing, Progressing  Flowsheets (Taken 4/10/2023 0306)  Progress: no change  Outcome Evaluation: Patient sat up and dangled, voided well clear yellow urine, transparent dressing to left lateral chest clean and intact connected to drainage bag , serosanguineous fluid noted, bi-pap on and intact  Goal: Patient-Specific Goal (Individualized)  Outcome: Ongoing, Progressing  Goal: Absence of Hospital-Acquired Illness or Injury  Outcome: Ongoing, Progressing  Intervention: Identify and Manage Fall Risk  Recent Flowsheet Documentation  Taken 4/10/2023 0000 by Guillaume Lopez RN  Safety Promotion/Fall Prevention: safety round/check completed  Taken 4/9/2023 2200 by Guillaume Lopez RN  Safety Promotion/Fall Prevention: safety round/check completed  Taken 4/9/2023 2100 by Guillaume Lopez RN  Safety Promotion/Fall Prevention: safety round/check completed  Taken 4/9/2023 2000 by Guillaume Lopez RN  Safety Promotion/Fall Prevention: safety round/check completed  Intervention: Prevent Skin Injury  Recent Flowsheet Documentation  Taken 4/10/2023 0000 by Guillaume Lopez RN  Body Position: position changed independently  Taken 4/9/2023 2200 by Guillaume Lopez RN  Body Position: position changed independently  Taken 4/9/2023 2100 by Guillaume Lopez RN  Body Position: position changed independently  Skin Protection:   tubing/devices free from skin contact   transparent dressing maintained   adhesive use limited  Taken 4/9/2023 2000 by Guillaume Lopez RN  Body Position: position changed independently  Intervention: Prevent and Manage VTE (Venous Thromboembolism) Risk  Recent Flowsheet Documentation  Taken 4/9/2023 2200 by Guillaume Lopez, RN  Activity Management: back to bed  Taken 4/9/2023 2100 by Guillaume Lopez, RN  Activity Management: sitting, edge of bed  VTE Prevention/Management: (heparin) other (see comments)  Range of Motion: active ROM (range of  motion) encouraged  Intervention: Prevent Infection  Recent Flowsheet Documentation  Taken 4/10/2023 0000 by Guillaume Lopez RN  Infection Prevention:   single patient room provided   rest/sleep promoted  Taken 4/9/2023 2100 by Guillaume Lopez RN  Infection Prevention:   single patient room provided   visitors restricted/screened  Taken 4/9/2023 2000 by Guillaume Lopez RN  Infection Prevention:   single patient room provided   rest/sleep promoted  Goal: Optimal Comfort and Wellbeing  Outcome: Ongoing, Progressing  Intervention: Provide Person-Centered Care  Recent Flowsheet Documentation  Taken 4/9/2023 2100 by Guillaume Lopez RN  Trust Relationship/Rapport:   care explained   questions answered   questions encouraged  Goal: Readiness for Transition of Care  Outcome: Ongoing, Progressing     Problem: Fall Injury Risk  Goal: Absence of Fall and Fall-Related Injury  Outcome: Ongoing, Progressing  Intervention: Identify and Manage Contributors  Recent Flowsheet Documentation  Taken 4/9/2023 2200 by Guillaume Lopez RN  Medication Review/Management: medications reviewed  Taken 4/9/2023 2100 by Guillaume Lopez RN  Medication Review/Management: medications reviewed  Intervention: Promote Injury-Free Environment  Recent Flowsheet Documentation  Taken 4/10/2023 0000 by Guillaume Lopez RN  Safety Promotion/Fall Prevention: safety round/check completed  Taken 4/9/2023 2200 by Guillaume Lopez RN  Safety Promotion/Fall Prevention: safety round/check completed  Taken 4/9/2023 2100 by Guillaume Lopez RN  Safety Promotion/Fall Prevention: safety round/check completed  Taken 4/9/2023 2000 by Guillaume Lopez RN  Safety Promotion/Fall Prevention: safety round/check completed     Problem: Obstructive Sleep Apnea Risk or Actual Comorbidity Management  Goal: Unobstructed Breathing During Sleep  Outcome: Ongoing, Progressing     Problem: Noninvasive Ventilation Acute  Goal: Effective Unassisted Ventilation and Oxygenation  Outcome:  Ongoing, Progressing     Problem: Fluid Volume Excess  Goal: Fluid Balance  Outcome: Ongoing, Progressing  Intervention: Monitor and Manage Hypervolemia  Recent Flowsheet Documentation  Taken 4/9/2023 2100 by Guillaume Lopez RN  Skin Protection:   tubing/devices free from skin contact   transparent dressing maintained   adhesive use limited     Problem: Skin Injury Risk Increased  Goal: Skin Health and Integrity  Outcome: Ongoing, Progressing  Intervention: Optimize Skin Protection  Recent Flowsheet Documentation  Taken 4/10/2023 0000 by Guillaume Lopez RN  Head of Bed (HOB) Positioning: HOB at 30 degrees  Taken 4/9/2023 2200 by Guillaume Lopez RN  Head of Bed ([facility]) Positioning: HOB at 30-45 degrees  Taken 4/9/2023 2100 by Guillaume Lopez RN  Head of Bed ([facility]) Positioning: HOB at 30-45 degrees  Skin Protection:   tubing/devices free from skin contact   transparent dressing maintained   adhesive use limited  Taken 4/9/2023 2000 by Guillaume Lopez RN  Head of Bed ([facility]) Positioning: HOB at 30-45 degrees   Goal Outcome Evaluation:           Progress: no change  Outcome Evaluation: Patient sat up and dangled, voided well clear yellow urine, transparent dressing to left lateral chest clean and intact connected to drainage bag , serosanguineous fluid noted, bi-pap on and intact

## 2023-04-10 NOTE — CASE MANAGEMENT/SOCIAL WORK
Continued Stay Note   [facility]     Patient Name: Norm Tracey  MRN: [id number]  Today's Date: 4/10/2023    Admit Date: 4/4/2023    Plan: Home with Spouse / [facility] ([facility]val pending)   Discharge Plan     Row Name 04/10/23 1036       Plan    Plan Home with Spouse / [facility] (approval pending)    Plan Comments APRIL called [facility] at [phone] and was able to speak to RN, Isidra. Isidra has been working on bipap approval but says approval is still pending. Isidra's direct number for further contact is [phone]. Isidra is unsure if bipap will be approved before patient is medically ready for discharge. Isidra asked that APRIL call her when patient is discharged to notify and also fax discharge summary to [facility] at [phone].                       Expected Discharge Date and Time     Expected Discharge Date Expected Discharge Time    Apr 7, 2023             TANESHA Raza

## 2023-04-10 NOTE — PROGRESS NOTES
[facility] -  Progress Note     LOS: 6 days   Patient Care Team:  Provider, No Known as PCP - General    Chief Complaint: pericardial effusion follow up     Subjective     Interval History: Awake and alert lying in bed, wife at the bedside. Feels at his baseline denying any current shortness of breath, chest pain. He has pericardial drain in place.  75 cc out in 12 hours prior to am shift today. Since 6 am, there is minimal drainage. He has been ambulatory, without complaints.     Review of Systems:     Review of Systems   Constitutional: Negative for activity change and fatigue.   HENT: Negative for congestion.    Respiratory: Negative for cough, chest tightness, shortness of breath and wheezing.    Cardiovascular: Negative for chest pain, palpitations and leg swelling.   Gastrointestinal: Negative for abdominal pain, diarrhea, nausea and vomiting.   Genitourinary: Negative for difficulty urinating.   Neurological: Negative for dizziness, tremors, weakness and light-headedness.     Objective     Vital Sign Min/Max for last 24 hours  Temp  Min: 97.6 °F (36.4 °C)  Max: 98.7 °F (37.1 °C)   BP  Min: 108/59  Max: 132/72   Pulse  Min: 66  Max: 80   Resp  Min: 16  Max: 16   SpO2  Min: 91 %  Max: 96 %   No data recorded   Weight  Min: 122 kg (269 lb)  Max: 122 kg (269 lb)         04/09/23 2000   Weight: 122 kg (269 lb)       Physical Exam:    Vitals reviewed.   Constitutional:       General: Awake. Not in acute distress.     Appearance: Normal appearance. Well-developed and not in distress. Morbidly obese. Chronically ill-appearing.   HENT:      Head: Normocephalic and atraumatic.   Pulmonary:      Effort: Pulmonary effort is normal.      Breath sounds: Normal breath sounds. No wheezing. No rhonchi. No rales.   Cardiovascular:      Normal rate. Regular rhythm.      Murmurs: There is no murmur.      No gallop. No rub.   Edema:     Ankle: trace edema of the left ankle.     Feet: trace edema of the  right foot.  Abdominal:      General: Bowel sounds are normal.      Tenderness: There is no abdominal tenderness.   Musculoskeletal:      Cervical back: Normal range of motion and neck supple. Skin:     General: Skin is warm and dry.   Neurological:      Mental Status: Alert, oriented to person, place, and time and oriented to person, place and time.   Psychiatric:         Attention and Perception: Attention normal.         Mood and Affect: Mood normal.         Speech: Speech normal.         Behavior: Behavior normal. Behavior is cooperative.         Thought Content: Thought content normal.         Cognition and Memory: Cognition normal.         Judgment: Judgment normal.       Results Review:     Lab Results   Component Value Date    GLUCOSE 101 (H) 04/10/2023    CALCIUM 8.4 (L) 04/10/2023     04/10/2023    K 3.5 04/10/2023    CO2 40.0 (H) 04/10/2023     04/10/2023    BUN 13 04/10/2023    CREATININE 0.60 (L) 04/10/2023    EGFR 101.9 04/10/2023    BCR 21.7 04/10/2023    ANIONGAP 5.0 04/10/2023     Lab Results   Component Value Date    WBC 3.67 04/10/2023    HGB 10.9 (L) 04/10/2023    HCT 36.4 (L) 04/10/2023    MCV 93.8 04/10/2023    PLT 81 (L) 04/10/2023       Medication Review: yes  Current Facility-Administered Medications   Medication Dose Route Frequency Provider Last Rate Last Admin   • amLODIPine (NORVASC) tablet 5 mg  5 mg Oral Daily Chago Gonsalves MD   5 mg at 04/10/23 0848   • busPIRone (BUSPAR) tablet 10 mg  10 mg Oral BID Chago Gonsalves MD   10 mg at 04/10/23 0848   • cholecalciferol (VITAMIN D3) tablet 1,000 Units  1,000 Units Oral Daily Chago Gonsalves MD   1,000 Units at 04/10/23 0848   • colchicine tablet 0.6 mg  0.6 mg Oral BID Chago Gonsalves MD   0.6 mg at 04/10/23 0847   • docusate sodium (COLACE) capsule 100 mg  100 mg Oral Daily Chago Gonsalves MD   100 mg at 04/09/23 0821   • ferrous sulfate tablet 325 mg  325 mg Oral Daily With Breakfast Chago Gonsalves MD   325 mg at 04/10/23 0894   •  furosemide (LASIX) tablet 40 mg  40 mg Oral BID Chago Gonsalves MD   40 mg at 04/10/23 0848   • heparin (porcine) 5000 UNIT/ML injection 5,000 Units  5,000 Units Subcutaneous Q8H Carlos Nguyen APRN   5,000 Units at 04/10/23 0628   • mirtazapine (REMERON) tablet 15 mg  15 mg Oral Nightly Chago Gonsalves MD   15 mg at 04/09/23 2112   • multivitamin with minerals 1 tablet  1 tablet Oral Daily Chago Gonsalves MD   1 tablet at 04/10/23 0848   • sertraline (ZOLOFT) tablet 200 mg  200 mg Oral Daily Chago Gonsalves MD   200 mg at 04/10/23 0848   • sotalol (BETAPACE) tablet 80 mg  80 mg Oral BID Chago Gonsalves MD   80 mg at 04/10/23 0847   • terazosin (HYTRIN) capsule 5 mg  5 mg Oral Q12H GonsalvesChago ho MD   5 mg at 04/10/23 0848   • traMADol (ULTRAM) tablet 50 mg  50 mg Oral BID PRN Chago Gonsalves MD         Results for orders placed during the hospital encounter of 04/04/23    Adult Transthoracic Echo Limited W/ Cont if Necessary Per Protocol    Interpretation Summary  •  There is a trivial pericardial effusion that is significantly reduced in size compared to the prior study from 4/5/23 s/p pericardial drain.  •  There is thickening of the pericardium present. 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  •  Left ventricular systolic function is normal.  •  The right ventricle is not well visualized but appears probably in size with low normal systolic function.    Interpretation Summary  TTE 4/5/2023     •  Left ventricular ejection fraction appears to be 56 - 60%.  •  Left ventricular diastolic function was indeterminate.  •  Left atrial volume is mildly increased.  •  Estimated right ventricular systolic pressure from tricuspid regurgitation is normal (<35 mmHg).  •  There is a large (>2cm) pericardial effusion.  •  Unclear if tamponade. No detailed interrogation performed. No clearcut RV or RA compression      Assessment & Plan     1. Pericardial effusion: status post  pericardiocentesis. Output reduced significantly. 75 cc on 12 hour shift last night. Minimal output this morning, less than 30 cc in the past 7 hours. Drain was removed at bedside. Pericardial fluid cytology is pending.  2.  Paroxysmal Atrial Fibrillation: currently in SR on tele. He has a two hour episode of AF this am and was asymptomatic. Eliquis on hold due to pericardial drain. We will plan to resume tomorrow. On AAD therapy as at home.  3.  FELIX: currently on Bipap with sleep - attending is working on discharge arrangements as this is a current concern with the wife.   4.  HTN: well controlled on home medication  5.  Morbid Obesity      Drain removal today.   Resume home Eliquis tomorrow.   Likely home tomorrow and arrange follow up with primary cardiologist, Dr. Soria in one week with plans for follow up echo.  Continue colchicine as per admission med list.         Electronically signed by MARGUERITE Ortiz, 04/10/23, 2:12 PM CDT.

## 2023-04-10 NOTE — PROGRESS NOTES
[facility]  INPATIENT PROGRESS NOTE    Patient Name: Norm Tracey  Date of Admission: 4/4/2023  Today's Date: 04/10/23  Length of Stay: 6  Primary Care Physician: Provider, No Known    Subjective   Chief Complaint: Follow-up shortness of breath  HPI   Mr. Tracey transferred from [facility] 4/4/23 with worsening shortness of breath, atrial fibrillation with RVR on sotalol and Eliquis, and CT scan showing large pericardial effusion.  Patient treated for pericarditis as outpatient by Dr. Soria and was originally referred to him for new onset atrial fibrillation and found to have pericardial effusion.  He was started on colchicine and Lasix.  Pericardial effusion has worsened over the last few weeks associate with increasing shortness of breath.  Patient has a history of morbid obesity, hypertension, Combs, new diagnosis of obstructive sleep apnea.  Etiology of pericardial effusion unknown.  Patient undergoing work-up for hip surgery and found to have pericardial effusion and atrial fibrillation during work-up.  He represented to [facility] with worsening shortness of breath noted to be in A-fib with RVR.  He was started on a Cardizem drip and placed on BiPAP.  CT scan of the chest reported large pericardial effusion with bilateral pleural effusions but no tamponade.  Discussed with CT surgery Dr. Yip accepted patient in transfer.    Patient up in room and up to bathroom per self.  He has been weaned off oxygen and is maintaining saturation greater than 91%.  He ambulated 160 feet with physical therapy today without oxygen.  No complaints of nausea, vomiting or diarrhea.  He had a bowel movement today.  No complaints of chest pain or palpitation.  Foot of bed elevated yesterday and peripheral edema has improved.  Legs are not as tight.  Continue Lasix.  Pericardial tube remains in place with 75 mL past 12 hours.  Discussed with cardiology and  would like pericardial drainage less than 50 mL per 24 hours prior to discontinue tube.  Eliquis remains on hold and will resume once pericardial tube removed.  With social service as well as patient and daughter present in room regarding BiPAP which [facility].  [facility]a [facility] and [facility] still not approved.  I did arrange follow-up for this Friday at [facility].    Review of Systems   Constitutional: Positive for activity change. Negative for chills, fatigue and fever.   HENT: Negative for congestion and trouble swallowing.    Eyes: Negative for photophobia and visual disturbance.   Respiratory: Positive for shortness of breath (Improved after pericardiocentesis). Negative for wheezing.    Cardiovascular: Negative for chest pain and palpitations.   Gastrointestinal: Positive for abdominal distention. Negative for constipation, diarrhea, nausea and vomiting.   Endocrine: Negative for cold intolerance, heat intolerance and polyuria.   Genitourinary: Negative for dysuria, frequency and urgency.   Musculoskeletal: Negative for back pain.   Skin: Negative for color change, pallor, rash and wound.   Allergic/Immunologic: Negative for immunocompromised state.   Neurological: Positive for weakness. Negative for light-headedness.   Hematological: Negative for adenopathy. Does not bruise/bleed easily.   Psychiatric/Behavioral: Negative for agitation, behavioral problems and confusion.      All pertinent negatives and positives are as above. All other systems have been reviewed and are negative unless otherwise stated.     Objective    Temp:  [97.6 °F (36.4 °C)-98.7 °F (37.1 °C)] 98.6 °F (37 °C)  Heart Rate:  [66-80] 66  Resp:  [16] 16  BP: (108-132)/(56-91) 111/64  Physical Exam  Vitals and nursing note reviewed.   Constitutional:       Appearance: He is obese.      Comments: Up in the room and sitting on side of bed.  No oxygen in use.  Saturation 91%.  Family members in room.    HENT:      Head: Normocephalic and atraumatic.      Nose: No congestion.      Mouth/Throat:      Pharynx: Oropharynx is clear.   Cardiovascular:      Rate and Rhythm: Normal rate and regular rhythm.      Heart sounds: No murmur heard.     Comments: Normal sinus rhythm 67 on telemetry.  Pulmonary:      Breath sounds: No wheezing, rhonchi or rales.      Comments: No oxygen in use.  Saturation 91-96%. Pericardial tube left anterior chest with serosanguineous drainage.  Abdominal:      General: There is distension.      Palpations: Abdomen is soft.   Genitourinary:     Comments: Voiding.  Musculoskeletal:         General: Swelling (Feet bilaterally, legs not as tight today.) present.   Skin:     General: Skin is warm and dry.   Neurological:      General: No focal deficit present.      Mental Status: He is alert and oriented to person, place, and time.   Psychiatric:         Mood and Affect: Mood normal.         Behavior: Behavior normal.         Thought Content: Thought content normal.         Judgment: Judgment normal.       Results Review:  I have reviewed the labs, radiology results, and diagnostic studies.    Laboratory Data:   Results from last 7 days   Lab Units 04/10/23  0531 04/09/23  0458 04/08/23  0514   WBC 10*3/mm3 3.67 4.33 5.94   HEMOGLOBIN g/dL 10.9* 10.9* 11.3*   HEMATOCRIT % 36.4* 35.9* 38.7   PLATELETS 10*3/mm3 81* 83* 96*      Results from last 7 days   Lab Units 04/10/23  0531 04/09/23  0458 04/08/23  1120 04/07/23  1009 04/06/23  0954   SODIUM mmol/L 145 146* 144   < > 143   POTASSIUM mmol/L 3.5 3.6 3.9   < > 4.1   CHLORIDE mmol/L 100 101 104   < > 99   CO2 mmol/L 40.0* 40.0* 34.0*   < > 36.0*   BUN mg/dL 13 19 26*   < > 47*   CREATININE mg/dL 0.60* 0.76 0.73*   < > 1.27   CALCIUM mg/dL 8.4* 8.4* 8.5*   < > 8.9   BILIRUBIN mg/dL  --   --   --   --  0.5   ALK PHOS U/L  --   --   --   --  67   ALT (SGPT) U/L  --   --   --   --  31   AST (SGOT) U/L  --   --   --   --  17   GLUCOSE mg/dL 101* 110*  126*   < > 186*    < > = values in this interval not displayed.     Imaging Results (All)     Procedure Component Value Units Date/Time    CT Chest Without Contrast Diagnostic [[id number]] Collected: 04/05/23 0913     Updated: 04/05/23 0932    Narrative:      EXAMINATION: CT CHEST WO CONTRAST DIAGNOSTIC-      4/5/2023 8:48 AM CDT     HISTORY: pericardial effusion     In order to have a CT radiation dose as low as reasonably achievable  Automated Exposure Control was utilized for adjustment of the mA and/or  KV according to patient size.     DLP in mGycm= 969     The CT scan of the chest is performed without intravenous contrast  enhancement.     Images are acquired in axial plane and subsequent reconstruction in  coronal and sagittal planes.     There is no previous similar study for comparison.     There is significant respiratory motion artifacts which is noted  diagnostic quality of the study. This may obscure a subtle  nodule/lesion.     The lungs are poorly expanded.     There is a moderate right basal pleural effusion and a trace left basal  pleural effusion.     There is a right lower lobar consolidation adjacent to the pleural  effusion which may suggest compression atelectasis. Mild atelectatic  changes and pleural thickening is seen at the left base posteriorly.     Visualized central airway is patent. The distal airway is poorly  visualized and evaluated due to respiratory motion artifacts. No  significant endobronchial abnormality or a lesion.     The limited visualized soft tissues of the neck are unremarkable.  Probable low-density nodule in the left lobe of the thyroid gland. There  is no axillary lymphadenopathy.     Atheromatous changes of the thoracic aorta noted. No aneurysmal  dilatation. The pulmonary arteries are of normal size and shape. The  coronary arteries are not optimally visualized or evaluated. No  significant calcification the course of the coronary arteries.     There is a large  pericardial effusion which measures up to 3.2 cm in  width.     There is a large Bochdalek hernia with peritoneal fat and infiltration.     The limited visualized unenhanced abdomen show small amount of fluid in  the upper abdomen around the liver and spleen. Exophytic nodule/masses  are seen in the limited visualized kidneys. The gallbladder is small and  contracted. No radiopaque calculi are noted. Pancreas and kidneys are  incompletely included and not evaluated. The abdomen is separately  reviewed and reported with same day CT scan of the abdomen     Images reviewed in bone windows show chronic degenerative changes of the  thoracic spine with a mild levoscoliosis. No acute bony abnormality.  There is accentuated thoracic kyphosis. Hardware fusion of the lower  cervical spine is visualized. No hardware complication. Old healed  fractures of the left ribs are noted.       Impression:      1. A large pericardial effusion.  2. A moderate right and a trace left pleural effusion.  3. Right lower lobar consolidation/compression atelectasis.   This report was finalized on 04/05/2023 09:29 by Dr. Carly Espino MD.    CT Abdomen Pelvis Without Contrast [[id number]] Collected: 04/05/23 0909     Updated: 04/05/23 0922    Narrative:      EXAM/TECHNIQUE: CT abdomen pelvis without contrast     INDICATION: Lymphadenopathy, groin     COMPARISON: None     DLP: 969 mGy cm. Automated exposure control was also utilized to  decrease patient radiation dose.     FINDINGS:     Large pericardial effusion measuring up to 3 cm in thickness. Moderate  RIGHT pleural effusion with atelectasis.     Unenhanced liver appears unremarkable. Gallbladder is decompressed. No  gallstones or biliary ductal dilatation.      Pancreas, spleen, and RIGHT adrenal gland are unremarkable.  Indeterminate 4.0 x 2.3 cm LEFT adrenal gland nodule.      Multiple bilateral low-density renal lesions compatible with cysts. No  renal or ureteral calculi. No  hydronephrosis. 1.7 cm calculus layering  in the posterior midline urinary bladder. Small locules of gas in the  urinary bladder lumen are noted. No focal urinary bladder wall  thickening.     Colonic diverticulosis without evidence of diverticulitis. No colonic  wall thickening or pericolonic fat stranding. No abnormal small bowel  distention or evidence of active small bowel inflammation. No  pneumatosis or evidence of pneumoperitoneum.     Small volume ascites throughout the abdomen and pelvis. No pelvic mass  organized pelvic collection. Prostate is enlarged measuring 5 cm  transverse dimension. Nonaneurysmal atherosclerotic abdominal aorta. No  enlarged abdominal or pelvic lymph nodes. Moderate to large right-sided  hydrocele.     Diffuse body wall soft tissue edema. Severe osteoarthritis in the LEFT  hip. Grade 1 anterolisthesis of L4 on L5.       Impression:         1.  Large pericardial effusion. Close clinical follow-up is recommended.  2.  Moderate RIGHT pleural effusion with overlying atelectasis.  3.  Small volume ascites throughout the abdomen and pelvis.  4.  No abdominal or pelvic lymphadenopathy.  5.  Moderate to large right-sided hydrocele.  6.  1.7 cm calculus in the urinary bladder.  7.  Indeterminate 4 cm LEFT adrenal gland nodule. Differential includes  adrenal gland mass as well as focal adrenal gland hemorrhage. Recommend  follow-up adrenal protocol CT or MR.  8.  Enlarged prostate.  This report was finalized on 04/05/2023 09:19 by Dr. Jacoby Oscar MD.        Results for orders placed during the hospital encounter of 04/04/23    Adult Transthoracic Echo Limited W/ Cont if Necessary Per Protocol    Interpretation Summary  •  There is a trivial pericardial effusion that is significantly reduced in size compared to the prior study from 4/5/23 s/p pericardial drain.  •  There is thickening of the pericardium present. There is evidence of effusive-constrictive pericarditis with excessive  respiratory variation.  •  Left ventricular systolic function is normal.  •  The right ventricle is not well visualized but appears probably in size with low normal systolic function.      Scheduled medications:  amLODIPine, 5 mg, Oral, Daily  busPIRone, 10 mg, Oral, BID  cholecalciferol, 1,000 Units, Oral, Daily  colchicine, 0.6 mg, Oral, BID  docusate sodium, 100 mg, Oral, Daily  ferrous sulfate, 325 mg, Oral, Daily With Breakfast  furosemide, 40 mg, Oral, BID  heparin (porcine), 5,000 Units, Subcutaneous, Q8H  mirtazapine, 15 mg, Oral, Nightly  multivitamin with minerals, 1 tablet, Oral, Daily  sertraline, 200 mg, Oral, Daily  sotalol, 80 mg, Oral, BID  terazosin, 5 mg, Oral, Q12H      I have reviewed the patient's current medications.     Assessment/Plan   Assessment  Active Hospital Problems    Diagnosis    • **Pericardial effusion    • Hypercapnia    • Essential hypertension    • Suspected obstructive sleep apnea with hypercapnia and probable obesity hypoventilation    • Obesity, morbid, BMI 50 or higher    • Paroxysmal atrial fibrillation    • Stage 3a chronic kidney disease      Treatment Plan  Large pericardial effusion greater than 2 cm per echocardiogram 4/5/2023.  Cardiology, Dr. Eid and CTS Dr. Yip consulted.  Patient received 1 dose of Eliquis on admission and has been on hold since.  Dr. Yip notes patient with ascites and history of BRYAN and does not want to create true pericardial window as if patient developed worsening portal hypertension and ascites this could cause worsening pericardial effusion.  Pericardial effusion amendable to percutaneous drainage.  Dr. Gonsalves performed pericardiocentesis 4/7/23 with 1150 cc bloody fluid removed.  Fluid sent for for cytologies and cultures.  Patient had an additional 600 mL drainage and 75 mL past 12 hours.  Discussed with MARGUERITE Murphy cardiology would like for pericardial fluid drainage to be less than 50 mL 24 hours prior to removal.  Continue  heparin and restart Eliquis once pericardial tube removed.  Follow-up echo  4/8 notes trivial pericardial effusion and significant decrease in size prior to pericardiocentesis.    Paroxysmal atrial fibrillation.  Patient normal sinus rhythm today. Continue sotalol and heparin.  Restart Eliquis after pericardial tube removed.    Stage IIIa chronic kidney disease.  Creatinine 1.4 on admit.  Creatinine 0.6 today.  BMP in AM.    Suspected obstructive sleep apnea with hypercapnia and probably component of obesity hypoventilation.  Wife indicates patient prescribed BiPAP and oxygen and [facility].  However, BiPAP and oxygen not yet delivered.   contacting [facility] and confirming device.  Pulmonary medicine consulted and discussed with Dr. Espinoza who notes hypercapnia and suspects FELIX with obesity hypoventilation.  If [facility] requires sleep study then can confirm and get arranged.  Continue BiPAP at night while hospitalized.  [facility] with the [facility] [place] or pulmonary medicine [place].  No additional pulmonary testing recommended at this time. CMNs or any other paperwork involved with arranging for sleep testing or ordering noninvasive respiratory support devices should be routed to his [facility] doctors or other local doctor who will be managing this going forward.    Discussed with LATRICIA Guevara [facility] clinic today. BiPAP still not approved and may not be approved prior to patient being medically ready for discharge.  Have arranged follow-up with [facility]UERITE Paz on [facility] at [facility].    Hypercapnia.  ABGs 4/6/23 pH 7.29, PCO2 71 on 3 L.  Placed on BiPAP.  Pulmonary medicine consulted.  Patient has been weaned to room air and is long longer requiring supplemental oxygen but continues wearing BiPAP at night for hypercapnia.  Saturation 91-96% on room air.  Ambulating without desaturation.    Primary hypertension.  Blood pressure 111/64.  Amlodipine continued.    Potassium 3.5 today.  Replace potassium x1  dose.  BMP in AM.    Morbid obesity with BMI 51.92.     Patient ambulated 160 feet with physical therapy.    Medical Decision Making  Number and Complexity of problems: 6  Large pericardial effusion: Acute, high complexity  Paroxysmal atrial fibrillation: Chronic, moderate complexity  Stage IIIa chronic kidney disease: Chronic, moderate complexity  Suspected obstructive sleep apnea: New diagnosis, moderate complexity.  Primary hypertension: Chronic, low complexity  Morbid obesity: Chronic, low complexity    Differential Diagnosis: Malignant pericardial effusion    Conditions and Status        Condition is improving     MDM Data  External documents reviewed: [facility] transfer records  Cardiac tracing (EKG, telemetry) interpretation: Normal sinus rhythm, intermittent atrial flutter per telemetry  Radiology interpretation: Radiology interpretation CT abdomen, reviewed echo  Labs reviewed:   CMP 4/10/2023  CBC  4/10/23    Any tests that were considered but not ordered: None     Decision rules/scores evaluated (example [id number], Wells, etc): None     Discussed with: Dr. Euceda, Cardiology, patient and wife Roselia.  Called and discussed with LATRICIA Guevara [facility]     Care Planning  Shared decision making: Dr. Euceda, cardiology and patient. Patient agreeable to pericardiocentesis tube, wean oxygen, ambulate  Code status and discussions: Full code  The patient surrogate decision maker is his wife, Roselia    Disposition  Social Determinants of Health that impact treatment or disposition: None  I expect the patient to be discharged to home in 1-2 days.     Electronically signed by MARGUERITE William, 04/10/23, 14:11 CDT.

## 2023-04-10 NOTE — THERAPY TREATMENT NOTE
Acute Care - Physical Therapy Treatment Note  [facility]     Patient Name: Norm Tracey  : 1950  MRN: [id number]  Today's Date: 4/10/2023      Visit Dx:     ICD-10-CM ICD-9-CM   1. Pericardial effusion  I31.39 423.9   2. Impaired mobility  Z74.09 799.89     Patient Active Problem List   Diagnosis   • Pericardial effusion   • Essential hypertension   • Suspected obstructive sleep apnea with hypercapnia and probable obesity hypoventilation   • Obesity, morbid, BMI 50 or higher   • Paroxysmal atrial fibrillation   • Stage 3a chronic kidney disease   • Hypercapnia     Past Medical History:   Diagnosis Date   • Atrial fibrillation with rapid ventricular response    • Essential hypertension    • Obesity due to excess calories with serious comorbidity    • Pneumonia    • Respiratory failure    • Sleep apnea      Past Surgical History:   Procedure Laterality Date   • CARDIAC CATHETERIZATION N/A 2023    Procedure: Pericardiocentesis;  Surgeon: Chago Gonsalves MD;  Location: [facility] INVASIVE LOCATION;  Service: Cardiology;  Laterality: N/A;     PT Assessment (last 12 hours)     PT Evaluation and Treatment     Row Name 04/10/23 1453 04/10/23 0939       Physical Therapy Time and Intention    Subjective Information no complaints  -WK no complaints  -WK    Document Type therapy note (daily note)  -WK therapy note (daily note)  -WK    Mode of Treatment physical therapy  -WK physical therapy  -WK    Row Name 04/10/23 1453 04/10/23 0939       General Information    Existing Precautions/Restrictions fall  -WK fall  pericardialcentesis bag  -WK    Row Name 04/10/23 1453 04/10/23 0939       Bed Mobility    Supine-Sit [place] (Bed Mobility) supervision  -WK standby assist  -WK    Row Name 04/10/23 1453 04/10/23 0939       Sit-Stand Transfer    Sit-Stand [place] (Transfers) supervision  -WK standby assist  -WK    Row Name 04/10/23 1453 04/10/23 0939       Gait/Stairs (Locomotion)    [place] Level (Gait)  standby assist  -WK verbal cues;contact guard  -WK    Distance in Feet (Gait) 120  10' into BR.  -  -WK    Deviations/Abnormal Patterns (Gait) base of support, wide;gait speed decreased  -WK base of support, wide;gait speed decreased  -WK    [place] Level (Stairs) -- verbal cues;contact guard  -WK    Handrail Location (Stairs) -- right side (descending);right side (ascending)  -WK    Number of Steps (Stairs) -- 2  -WK    Descending Technique (Stairs) -- step-to-step  -WK    Comment, (Gait/Stairs) increase in L hip pain and stated he didn't want to walk as far as this morning.  -WK --    Row Name 04/10/23 0939          Motor Skills    Therapeutic Exercise aerobic  -WK     Row Name 04/10/23 0939          Aerobic Exercise    Comment, Aerobic Exercise (Therapeutic Exercise) BLE AROM 20 reps sitting.  -WK     Row Name 04/10/23 0939          Plan of Care Review    Plan of Care Reviewed With patient  -WK     Progress improving  -WK     Outcome Evaluation Pt amb 160' CGA with mild unsteadiness. At times pt reaches for object but does not require support. Pt performed 2 steps CGA.  -WK     Row Name 04/10/23 1453 04/10/23 0939       Positioning and Restraints    Pre-Treatment Position in bed  -WK in bed  -WK    Post Treatment Position bed  -WK bed  -WK    In Bed fowlers;call light within reach;encouraged to call for assist  -WK fowlers;call light within reach;encouraged to call for assist;with family/caregiver  -WK          User Key  (r) = Recorded By, (t) = Taken By, (c) = Cosigned By    Initials Name Provider Type    WK Josefa Espinoza, PTA Physical Therapist Assistant                Physical Therapy Education     Title: PT OT SLP Therapies (In Progress)     Topic: Physical Therapy (In Progress)     Point: Mobility training (Done)     Learning Progress Summary           Patient Acceptance, E, VU by AA at 4/7/2023 0930    Comment: Pt was educated on POC, benefits of activity, and safety with transfers and gait.                    Point: Home exercise program (Not Started)     Learner Progress:  Not documented in this visit.          Point: Body mechanics (Not Started)     Learner Progress:  Not documented in this visit.          Point: Precautions (Not Started)     Learner Progress:  Not documented in this visit.                      User Key     Initials Effective Dates Name Provider Type Discipline    AA 01/03/23 -  Pamela Tinajero, PT Student PT Student PT              PT Recommendation and Plan     Plan of Care Reviewed With: patient  Progress: improving  Outcome Evaluation: Pt amb 160' CGA with mild unsteadiness. At times pt reaches for object but does not require support. Pt performed 2 steps CGA.   Outcome Measures     Row Name 04/08/23 1441             How much help from another person do you currently need...    Turning from your back to your side while in flat bed without using bedrails? 3  -WK      Moving from lying on back to sitting on the side of a flat bed without bedrails? 3  -WK      Moving to and from a bed to a chair (including a wheelchair)? 3  -WK      Standing up from a chair using your arms (e.g., wheelchair, bedside chair)? 3  -WK      Climbing 3-5 steps with a railing? 3  -WK      To walk in hospital room? 3  -WK      AM-PAC 6 Clicks Score (PT) 18  -WK         Functional Assessment    Outcome Measure Options AM-PAC 6 Clicks Basic Mobility (PT)  -WK            User Key  (r) = Recorded By, (t) = Taken By, (c) = Cosigned By    Initials Name Provider Type    WK Josefa Espinoza, PTA Physical Therapist Assistant                 Time Calculation:    PT Charges     Row Name 04/10/23 1457 04/10/23 1011          Time Calculation    Start Time 1443  -WK 0939  -WK     Stop Time 1453  -WK 1007  -WK     Time Calculation (min) 10 min  -WK 28 min  -WK     PT Received On 04/10/23  -WK 04/10/23  -WK        Time Calculation- PT    Total Timed Code Minutes- PT 10 minute(s)  -WK 28 minute(s)  -WK           User Key  (r)  = Recorded By, (t) = Taken By, (c) = Cosigned By    Initials Name Provider Type    WK Josefa Espinoza PTA Physical Therapist Assistant              Therapy Charges for Today     Code Description Service Date Service Provider Modifiers Qty    05533033123 HC GAIT TRAINING EA 15 MIN 4/9/2023 Josefa Espinoza, NATE GP 1    73986119954 HC GAIT TRAINING EA 15 MIN 4/10/2023 Josefa Espinoza, NATE GP 1    61219445271 HC PT THER PROC EA 15 MIN 4/10/2023 Josefa Espinoza, NATE GP 1    88790708155 HC GAIT TRAINING EA 15 MIN 4/10/2023 Josefa Espinoza, PTA GP 1          PT G-Codes  Outcome Measure Options: AM-PAC 6 Clicks Basic Mobility (PT)  AM-PAC 6 Clicks Score (PT): 20    Josefa Espinoza PTA  4/10/2023

## 2023-04-10 NOTE — PLAN OF CARE
Goal Outcome Evaluation:  Plan of Care Reviewed With: patient        Progress: improving  Outcome Evaluation: Pt amb 160' CGA with mild unsteadiness. At times pt reaches for object but does not require support. Pt performed 2 steps CGA.

## 2023-04-11 ENCOUNTER — READMISSION MANAGEMENT (OUTPATIENT)
Dept: CALL CENTER | Facility: HOSPITAL | Age: 73
End: 2023-04-11
Payer: MEDICARE

## 2023-04-11 VITALS
HEART RATE: 70 BPM | HEIGHT: 61 IN | TEMPERATURE: 97.8 F | RESPIRATION RATE: 16 BRPM | SYSTOLIC BLOOD PRESSURE: 131 MMHG | WEIGHT: 263 LBS | BODY MASS INDEX: 49.65 KG/M2 | OXYGEN SATURATION: 90 % | DIASTOLIC BLOOD PRESSURE: 73 MMHG

## 2023-04-11 LAB
ANION GAP SERPL CALCULATED.3IONS-SCNC: 9 MMOL/L (ref 5–15)
BASOPHILS # BLD AUTO: 0.02 10*3/MM3 (ref 0–0.2)
BASOPHILS NFR BLD AUTO: 0.4 % (ref 0–1.5)
BUN SERPL-MCNC: 10 MG/DL (ref 8–23)
BUN/CREAT SERPL: 13.9 (ref 7–25)
CALCIUM SPEC-SCNC: 8.7 MG/DL (ref 8.6–10.5)
CHLORIDE SERPL-SCNC: 96 MMOL/L (ref 98–107)
CO2 SERPL-SCNC: 37 MMOL/L (ref 22–29)
CREAT SERPL-MCNC: 0.72 MG/DL (ref 0.76–1.27)
DEPRECATED RDW RBC AUTO: 47.8 FL (ref 37–54)
EGFRCR SERPLBLD CKD-EPI 2021: 96.5 ML/MIN/1.73
EOSINOPHIL # BLD AUTO: 0.15 10*3/MM3 (ref 0–0.4)
EOSINOPHIL NFR BLD AUTO: 2.9 % (ref 0.3–6.2)
ERYTHROCYTE [DISTWIDTH] IN BLOOD BY AUTOMATED COUNT: 13.8 % (ref 12.3–15.4)
GLUCOSE SERPL-MCNC: 128 MG/DL (ref 65–99)
HCT VFR BLD AUTO: 41 % (ref 37.5–51)
HGB BLD-MCNC: 12.4 G/DL (ref 13–17.7)
LYMPHOCYTES # BLD AUTO: 0.64 10*3/MM3 (ref 0.7–3.1)
LYMPHOCYTES NFR BLD AUTO: 12.4 % (ref 19.6–45.3)
MCH RBC QN AUTO: 28.2 PG (ref 26.6–33)
MCHC RBC AUTO-ENTMCNC: 30.2 G/DL (ref 31.5–35.7)
MCV RBC AUTO: 93.2 FL (ref 79–97)
MONOCYTES # BLD AUTO: 0.26 10*3/MM3 (ref 0.1–0.9)
MONOCYTES NFR BLD AUTO: 5 % (ref 5–12)
NEUTROPHILS NFR BLD AUTO: 4.06 10*3/MM3 (ref 1.7–7)
NEUTROPHILS NFR BLD AUTO: 78.7 % (ref 42.7–76)
PLATELET # BLD AUTO: 99 10*3/MM3 (ref 140–450)
PMV BLD AUTO: 10.9 FL (ref 6–12)
POTASSIUM SERPL-SCNC: 4 MMOL/L (ref 3.5–5.2)
RBC # BLD AUTO: 4.4 10*6/MM3 (ref 4.14–5.8)
SODIUM SERPL-SCNC: 142 MMOL/L (ref 136–145)
WBC NRBC COR # BLD: 5.16 10*3/MM3 (ref 3.4–10.8)

## 2023-04-11 PROCEDURE — 94799 UNLISTED PULMONARY SVC/PX: CPT

## 2023-04-11 PROCEDURE — 80048 BASIC METABOLIC PNL TOTAL CA: CPT | Performed by: HOSPITALIST

## 2023-04-11 PROCEDURE — 85025 COMPLETE CBC W/AUTO DIFF WBC: CPT | Performed by: INTERNAL MEDICINE

## 2023-04-11 PROCEDURE — 97116 GAIT TRAINING THERAPY: CPT

## 2023-04-11 PROCEDURE — 97530 THERAPEUTIC ACTIVITIES: CPT

## 2023-04-11 RX ADMIN — FUROSEMIDE 40 MG: 40 TABLET ORAL at 08:19

## 2023-04-11 RX ADMIN — APIXABAN 5 MG: 5 TABLET, FILM COATED ORAL at 08:19

## 2023-04-11 RX ADMIN — AMLODIPINE BESYLATE 5 MG: 5 TABLET ORAL at 08:18

## 2023-04-11 RX ADMIN — Medication 1 TABLET: at 08:19

## 2023-04-11 RX ADMIN — DOCUSATE SODIUM 100 MG: 100 CAPSULE ORAL at 08:19

## 2023-04-11 RX ADMIN — SERTRALINE HYDROCHLORIDE 200 MG: 100 TABLET, FILM COATED ORAL at 08:19

## 2023-04-11 RX ADMIN — BUSPIRONE HYDROCHLORIDE 10 MG: 10 TABLET ORAL at 08:21

## 2023-04-11 RX ADMIN — Medication 1000 UNITS: at 08:19

## 2023-04-11 RX ADMIN — SOTALOL HYDROCHLORIDE 80 MG: 80 TABLET ORAL at 08:19

## 2023-04-11 RX ADMIN — TERAZOSIN HYDROCHLORIDE 5 MG: 5 CAPSULE ORAL at 08:21

## 2023-04-11 RX ADMIN — COLCHICINE 0.6 MG: 0.6 TABLET ORAL at 08:18

## 2023-04-11 RX ADMIN — FERROUS SULFATE TAB 325 MG (65 MG ELEMENTAL FE) 325 MG: 325 (65 FE) TAB at 08:19

## 2023-04-11 NOTE — THERAPY TREATMENT NOTE
Acute Care - Physical Therapy Treatment Note  [facility]     Patient Name: Norm Tracey  : 1950  MRN: [id number]  Today's Date: 2023      Visit Dx:     ICD-10-CM ICD-9-CM   1. Pericardial effusion  I31.39 423.9   2. Impaired mobility  Z74.09 799.89     Patient Active Problem List   Diagnosis   • Pericardial effusion   • Essential hypertension   • Suspected obstructive sleep apnea with hypercapnia and probable obesity hypoventilation   • Obesity, morbid, BMI 50 or higher   • Paroxysmal atrial fibrillation   • Stage 3a chronic kidney disease   • Hypercapnia     Past Medical History:   Diagnosis Date   • Atrial fibrillation with rapid ventricular response    • Essential hypertension    • Obesity due to excess calories with serious comorbidity    • Pneumonia    • Respiratory failure    • Sleep apnea      Past Surgical History:   Procedure Laterality Date   • CARDIAC CATHETERIZATION N/A 2023    Procedure: Pericardiocentesis;  Surgeon: Chago Gonsalves MD;  Location: [facility] INVASIVE LOCATION;  Service: Cardiology;  Laterality: N/A;     PT Assessment (last 12 hours)     PT Evaluation and Treatment     Row Name 23          Physical Therapy Time and Intention    Subjective Information no complaints  -TB     Document Type therapy note (daily note)  -TB     Mode of Treatment physical therapy  -TB     Row Name 23          General Information    Existing Precautions/Restrictions fall  -TB     Row Name 23          Pain    Pretreatment Pain Rating 0/10 - no pain  -TB     Posttreatment Pain Rating 0/10 - no pain  -TB     Row Name 23          Bed Mobility    Bed Mobility scooting/bridging;supine-sit  -TB     Scooting/Bridging [place] (Bed Mobility) independent  -TB     Supine-Sit [place] (Bed Mobility) independent  -TB     Row Name 23          Transfers    Transfers sit-stand transfer;stand-sit transfer  -TB     Row Name 23           Sit-Stand Transfer    Sit-Stand [place] (Transfers) independent  -TB     Row Name 04/11/23 0900          Stand-Sit Transfer    Stand-Sit [place] (Transfers) independent  -TB     Row Name 04/11/23 0900          Gait/Stairs (Locomotion)    [place] Level (Gait) standby assist  -TB     Distance in Feet (Gait) 20'x3  -TB     Row Name 04/11/23 0900          Positioning and Restraints    Pre-Treatment Position in bed  -TB     Post Treatment Position bathroom  -TB     Bathroom notified nsg  -TB           User Key  (r) = Recorded By, (t) = Taken By, (c) = Cosigned By    Initials Name Provider Type    TB Aramis Rose, PTA Physical Therapist Assistant                Physical Therapy Education     Title: PT OT SLP Therapies (In Progress)     Topic: Physical Therapy (In Progress)     Point: Mobility training (Done)     Learning Progress Summary           Patient Acceptance, E, VU by MARSHALL at 4/7/2023 0930    Comment: Pt was educated on POC, benefits of activity, and safety with transfers and gait.                   Point: Home exercise program (Not Started)     Learner Progress:  Not documented in this visit.          Point: Body mechanics (Not Started)     Learner Progress:  Not documented in this visit.          Point: Precautions (Not Started)     Learner Progress:  Not documented in this visit.                      User Key     Initials Effective Dates Name Provider Type Discipline    MARSHALL 01/03/23 -  Pamela Tinajero, PT Student PT Student PT              PT Recommendation and Plan         Outcome Measures     Row Name 04/08/23 1441             How much help from another person do you currently need...    Turning from your back to your side while in flat bed without using bedrails? 3  -WK      Moving from lying on back to sitting on the side of a flat bed without bedrails? 3  -WK      Moving to and from a bed to a chair (including a wheelchair)? 3  -WK      Standing up from a chair using your arms (e.g.,  wheelchair, bedside chair)? 3  -WK      Climbing 3-5 steps with a railing? 3  -WK      To walk in hospital room? 3  -WK      AM-PAC 6 Clicks Score (PT) 18  -WK         Functional Assessment    Outcome Measure Options AM-PAC 6 Clicks Basic Mobility (PT)  -WK            User Key  (r) = Recorded By, (t) = Taken By, (c) = Cosigned By    Initials Name Provider Type    WK Josefa Espinoza PTA Physical Therapist Assistant                 Time Calculation:    PT Charges     Row Name 04/11/23 1338             Time Calculation    Start Time 0900  -TB      Stop Time 0925  -TB      Time Calculation (min) 25 min  -TB      PT Received On 04/11/23  -TB         Time Calculation- PT    Total Timed Code Minutes- PT 25 minute(s)  -TB            User Key  (r) = Recorded By, (t) = Taken By, (c) = Cosigned By    Initials Name Provider Type    TB Aramis Rose PTA Physical Therapist Assistant              Therapy Charges for Today     Code Description Service Date Service Provider Modifiers Qty    26093921695 HC GAIT TRAINING EA 15 MIN 4/11/2023 Aramis Rose PTA GP 1    02992026971 HC PT THERAPEUTIC ACT EA 15 MIN 4/11/2023 Aramis Rose, PTA GP 1          PT G-Codes  Outcome Measure Options: AM-PAC 6 Clicks Basic Mobility (PT)  AM-PAC 6 Clicks Score (PT): (P) 20    Aramis Rose PTA  4/11/2023

## 2023-04-11 NOTE — THERAPY DISCHARGE NOTE
Acute Care - Physical Therapy Discharge Summary  [facility]       Patient Name: Norm Tracey  : 1950  MRN: [id number]    Today's Date: 2023                 Admit Date: 2023      PT Recommendation and Plan    Visit Dx:    ICD-10-CM ICD-9-CM   1. Pericardial effusion  I31.39 423.9   2. Impaired mobility  Z74.09 799.89        Outcome Measures     Row Name 23 1441             How much help from another person do you currently need...    Turning from your back to your side while in flat bed without using bedrails? 3  -WK      Moving from lying on back to sitting on the side of a flat bed without bedrails? 3  -WK      Moving to and from a bed to a chair (including a wheelchair)? 3  -WK      Standing up from a chair using your arms (e.g., wheelchair, bedside chair)? 3  -WK      Climbing 3-5 steps with a railing? 3  -WK      To walk in hospital room? 3  -WK      AM-PAC 6 Clicks Score (PT) 18  -WK         Functional Assessment    Outcome Measure Options AM-PAC 6 Clicks Basic Mobility (PT)  -WK            User Key  (r) = Recorded By, (t) = Taken By, (c) = Cosigned By    Initials Name Provider Type    WK Josefa Espinoza PTA Physical Therapist Assistant                 PT Charges     Row Name 23 1338             Time Calculation    Start Time 0900  -TB      Stop Time 0925  -TB      Time Calculation (min) 25 min  -TB      PT Received On 23  -TB         Time Calculation- PT    Total Timed Code Minutes- PT 25 minute(s)  -TB            User Key  (r) = Recorded By, (t) = Taken By, (c) = Cosigned By    Initials Name Provider Type    TB Aramis Rose PTA Physical Therapist Assistant                 PT Rehab Goals     Row Name 23 1300             Bed Mobility Goal 1 (PT)    Activity/Assistive Device (Bed Mobility Goal 1, PT) sit to supine/supine to sit  -TB      [place] Level/Cues Needed (Bed Mobility Goal 1, PT) contact guard required  -TB      Time Frame (Bed Mobility Goal 1, PT)  long term goal (LTG)  -TB      Progress/Outcomes (Bed Mobility Goal 1, PT) goal met  -TB         Transfer Goal 1 (PT)    Activity/Assistive Device (Transfer Goal 1, PT) sit-to-stand/stand-to-sit;bed-to-chair/chair-to-bed;walker, rolling  -TB      [place] Level/Cues Needed (Transfer Goal 1, PT) contact guard required  -TB      Time Frame (Transfer Goal 1, PT) long term goal (LTG)  -TB      Progress/Outcome (Transfer Goal 1, PT) goal met  -TB         Gait Training Goal 1 (PT)    Activity/Assistive Device (Gait Training Goal 1, PT) gait (walking locomotion);improve balance and speed;increase endurance/gait distance;walker, rolling  -TB      [place] Level (Gait Training Goal 1, PT) contact guard required  -TB      Distance (Gait Training Goal 1, PT) 250 ft  -TB      Time Frame (Gait Training Goal 1, PT) long term goal (LTG)  -TB      Progress/Outcome (Gait Training Goal 1, PT) goal not met  -TB         Stairs Goal 1 (PT)    Activity/Assistive Device (Stairs Goal 1, PT) ascending stairs;descending stairs;using handrail, left;using handrail, right;improve balance and speed  -TB      [place] Level/Cues Needed (Stairs Goal 1, PT) contact guard required  -TB      Number of Stairs (Stairs Goal 1, PT) 2  -TB      Time Frame (Stairs Goal 1, PT) long term goal (LTG)  -TB      Progress/Outcome (Stairs Goal 1, PT) goal met  -TB            User Key  (r) = Recorded By, (t) = Taken By, (c) = Cosigned By    Initials Name Provider Type Discipline    TB Aramis Rose PTA Physical Therapist Assistant PT                Therapy Charges for Today     Code Description Service Date Service Provider Modifiers Qty    15045183764 HC GAIT TRAINING EA 15 MIN 4/11/2023 Aramis Rose, PTA GP 1    83208008934 HC PT THERAPEUTIC ACT EA 15 MIN 4/11/2023 Araims Rose PTA GP 1          PT Discharge Summary  Anticipated Discharge Disposition (PT): home with assist, home with home health  Reason for Discharge:  Discharge from facility  Outcomes Achieved: Refer to plan of care for updates on goals achieved  Discharge Destination: Home      Aramis Rose, PTA   4/11/2023

## 2023-04-11 NOTE — PLAN OF CARE
Goal Outcome Evaluation:              Outcome Evaluation: No c/o pain. Pt alternated between atrial flutter and sinus rhythm, and his HR once fell into the 20s for a few seconds. Pt's SpO2 dropped frequently during the night--pt initially refused BiPAP and nasal cannula, later wearing the BiPAP for a short time. Vitals otherwise stable. SpO2 91-95 on room air or BiPAP. Sinus 77-90 and flutter 87-90.

## 2023-04-11 NOTE — DISCHARGE SUMMARY
[facility] Medicine Services  DISCHARGE SUMMARY     Date of Admission: 4/4/2023  Date of Discharge:  4/11/2023  Primary Care Physician: Darrick Beltran FNP    Presenting Problem/History of Present Illness:  Shortness of breath    Final Discharge Diagnoses:  Active Hospital Problems    Diagnosis    • **Pericardial effusion    • Hypercapnia    • Essential hypertension    • Suspected obstructive sleep apnea with hypercapnia and probable obesity hypoventilation    • Obesity, morbid, BMI 50 or higher    • Paroxysmal atrial fibrillation    • Stage 3a chronic kidney disease      Consults:   1.  Cardiology, Dr. Eid/Dr. Gonsalves  2.  Dr. Espinoza, pulmonology    Procedures Performed: Pericardiocentesis/7/23, Dr. Gonsalves.  1150 cc bloody fluid removed.    Pertinent Test Results:   Results for orders placed during the hospital encounter of 04/04/23    Adult Transthoracic Echo Limited W/ Cont if Necessary Per Protocol    Interpretation Summary  •  There is a trivial pericardial effusion that is significantly reduced in size compared to the prior study from 4/5/23 s/p pericardial drain.  •  There is thickening of the pericardium present. 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  •  Left ventricular systolic function is normal.  •  The right ventricle is not well visualized but appears probably in size with low normal systolic function.    Transthoracic echocardiogram  4/5/23  •  Left ventricular ejection fraction appears to be 56 - 60%.  •  Left ventricular diastolic function was indeterminate.  •  Left atrial volume is mildly increased.  •  Estimated right ventricular systolic pressure from tricuspid regurgitation is normal (<35 mmHg).  •  There is a large (>2cm) pericardial effusion.  •  Unclear if tamponade. No detailed interrogation performed. No clearcut RV or RA compression     Imaging Results (All)     Procedure Component Value  Units Date/Time    CT Chest Without Contrast Diagnostic [[id number]] Collected: 04/05/23 0913     Updated: 04/05/23 0932    Narrative:      EXAMINATION: CT CHEST WO CONTRAST DIAGNOSTIC-      4/5/2023 8:48 AM CDT     HISTORY: pericardial effusion     In order to have a CT radiation dose as low as reasonably achievable  Automated Exposure Control was utilized for adjustment of the mA and/or  KV according to patient size.     DLP in mGycm= 969     The CT scan of the chest is performed without intravenous contrast  enhancement.     Images are acquired in axial plane and subsequent reconstruction in  coronal and sagittal planes.     There is no previous similar study for comparison.     There is significant respiratory motion artifacts which is noted  diagnostic quality of the study. This may obscure a subtle  nodule/lesion.     The lungs are poorly expanded.     There is a moderate right basal pleural effusion and a trace left basal  pleural effusion.     There is a right lower lobar consolidation adjacent to the pleural  effusion which may suggest compression atelectasis. Mild atelectatic  changes and pleural thickening is seen at the left base posteriorly.     Visualized central airway is patent. The distal airway is poorly  visualized and evaluated due to respiratory motion artifacts. No  significant endobronchial abnormality or a lesion.     The limited visualized soft tissues of the neck are unremarkable.  Probable low-density nodule in the left lobe of the thyroid gland. There  is no axillary lymphadenopathy.     Atheromatous changes of the thoracic aorta noted. No aneurysmal  dilatation. The pulmonary arteries are of normal size and shape. The  coronary arteries are not optimally visualized or evaluated. No  significant calcification the course of the coronary arteries.     There is a large pericardial effusion which measures up to 3.2 cm in  width.     There is a large Bochdalek hernia with peritoneal fat and  infiltration.     The limited visualized unenhanced abdomen show small amount of fluid in  the upper abdomen around the liver and spleen. Exophytic nodule/masses  are seen in the limited visualized kidneys. The gallbladder is small and  contracted. No radiopaque calculi are noted. Pancreas and kidneys are  incompletely included and not evaluated. The abdomen is separately  reviewed and reported with same day CT scan of the abdomen     Images reviewed in bone windows show chronic degenerative changes of the  thoracic spine with a mild levoscoliosis. No acute bony abnormality.  There is accentuated thoracic kyphosis. Hardware fusion of the lower  cervical spine is visualized. No hardware complication. Old healed  fractures of the left ribs are noted.       Impression:      1. A large pericardial effusion.  2. A moderate right and a trace left pleural effusion.  3. Right lower lobar consolidation/compression atelectasis.   This report was finalized on 04/05/2023 09:29 by Dr. Carly Espino MD.    CT Abdomen Pelvis Without Contrast [[id number]] Collected: 04/05/23 0909     Updated: 04/05/23 0922    Narrative:      EXAM/TECHNIQUE: CT abdomen pelvis without contrast     INDICATION: Lymphadenopathy, groin     COMPARISON: None     DLP: 969 mGy cm. Automated exposure control was also utilized to  decrease patient radiation dose.     FINDINGS:     Large pericardial effusion measuring up to 3 cm in thickness. Moderate  RIGHT pleural effusion with atelectasis.     Unenhanced liver appears unremarkable. Gallbladder is decompressed. No  gallstones or biliary ductal dilatation.      Pancreas, spleen, and RIGHT adrenal gland are unremarkable.  Indeterminate 4.0 x 2.3 cm LEFT adrenal gland nodule.      Multiple bilateral low-density renal lesions compatible with cysts. No  renal or ureteral calculi. No hydronephrosis. 1.7 cm calculus layering  in the posterior midline urinary bladder. Small locules of gas in the  urinary bladder  lumen are noted. No focal urinary bladder wall  thickening.     Colonic diverticulosis without evidence of diverticulitis. No colonic  wall thickening or pericolonic fat stranding. No abnormal small bowel  distention or evidence of active small bowel inflammation. No  pneumatosis or evidence of pneumoperitoneum.     Small volume ascites throughout the abdomen and pelvis. No pelvic mass  organized pelvic collection. Prostate is enlarged measuring 5 cm  transverse dimension. Nonaneurysmal atherosclerotic abdominal aorta. No  enlarged abdominal or pelvic lymph nodes. Moderate to large right-sided  hydrocele.     Diffuse body wall soft tissue edema. Severe osteoarthritis in the LEFT  hip. Grade 1 anterolisthesis of L4 on L5.       Impression:         1.  Large pericardial effusion. Close clinical follow-up is recommended.  2.  Moderate RIGHT pleural effusion with overlying atelectasis.  3.  Small volume ascites throughout the abdomen and pelvis.  4.  No abdominal or pelvic lymphadenopathy.  5.  Moderate to large right-sided hydrocele.  6.  1.7 cm calculus in the urinary bladder.  7.  Indeterminate 4 cm LEFT adrenal gland nodule. Differential includes  adrenal gland mass as well as focal adrenal gland hemorrhage. Recommend  follow-up adrenal protocol CT or MR.  8.  Enlarged prostate.  This report was finalized on 04/05/2023 09:19 by Dr. Jacoby Oscar MD.        LAB RESULTS:      Lab 04/11/23  0517 04/10/23  0531 04/09/23  0458 04/08/23  0514 04/07/23  0638 04/06/23  0834 04/05/23  0530   WBC 5.16 3.67 4.33 5.94 13.49* 11.47* 12.81*   HEMOGLOBIN 12.4* 10.9* 10.9* 11.3* 13.2 13.4 12.7*   HEMATOCRIT 41.0 36.4* 35.9* 38.7 43.4 44.6 42.1   PLATELETS 99* 81* 83* 96* 128* 166 132*   NEUTROS ABS 4.06 2.85 3.51 4.93 11.06* 9.51* 11.24*   IMMATURE GRANS (ABS)  --   --   --   --  0.19* 0.08* 0.10*   LYMPHS ABS 0.64* 0.46* 0.43* 0.48* 0.98 0.88 0.59*   MONOS ABS 0.26 0.22 0.23 0.34 0.89 0.77 0.80   EOS ABS 0.15 0.10 0.13 0.14 0.32  0.20 0.06   MCV 93.2 93.8 95.0 97.0 92.7 97.0 95.2   CRP  --   --   --   --   --   --  <0.30         Lab 04/11/23  0517 04/10/23  0531 04/09/23  0458 04/08/23  1120 04/07/23  1009 04/06/23  0954 04/05/23  0530   SODIUM 142 145 146* 144 141   < >  --    POTASSIUM 4.0 3.5 3.6 3.9 4.1   < >  --    CHLORIDE 96* 100 101 104 100   < >  --    CO2 37.0* 40.0* 40.0* 34.0* 34.0*   < >  --    ANION GAP 9.0 5.0 5.0 6.0 7.0   < >  --    BUN 10 13 19 26* 42*   < >  --    CREATININE 0.72* 0.60* 0.76 0.73* 1.05   < >  --    EGFR 96.5 101.9 94.9 96.1 75.0   < >  --    GLUCOSE 128* 101* 110* 126* 109*   < >  --    CALCIUM 8.7 8.4* 8.4* 8.5* 8.9   < >  --    TSH  --   --   --   --   --   --  2.720    < > = values in this interval not displayed.         Lab 04/06/23  0954   TOTAL PROTEIN 6.7   ALBUMIN 4.2   GLOBULIN 2.5   ALT (SGPT) 31   AST (SGOT) 17   BILIRUBIN 0.5   ALK PHOS 67                     Lab 04/06/23  1710 04/06/23  1547   PH, ARTERIAL 7.312* 7.297*   PCO2, ARTERIAL 69.2* 71.2*   PO2 ART 79.9* 79.8*   O2 SATURATION ART 96.3 95.8   FIO2 30  --    HCO3 ART 34.9* 34.8*   BASE EXCESS ART 6.5* 6.1*     Brief Urine Lab Results     None        Microbiology Results (last 10 days)     Procedure Component Value - Date/Time    Body Fluid Culture - Body Fluid, Pericardium [[id number]] Collected: 04/07/23 1253    Lab Status: Preliminary result Specimen: Body Fluid from Pericardium Updated: 04/11/23 0622     Body Fluid Culture No growth at 4 days     Gram Stain Moderate (3+) WBCs seen      No organisms seen    AFB Culture - Body Fluid, Pericardium [[id number]] Collected: 04/07/23 1253    Lab Status: Preliminary result Specimen: Body Fluid from Pericardium Updated: 04/08/23 0841     AFB Stain No acid fast bacilli seen on direct smear      No acid fast bacilli seen on concentrated smear        Hospital Course: Mr. Tracey transferred from [facility] 4/4/23 with worsening shortness of breath, atrial fibrillation with RVR on sotalol  and Eliquis, and CT scan showing large pericardial effusion.  Patient treated for pericarditis as outpatient by Dr. Soria and was originally referred to him for new onset atrial fibrillation and found to have pericardial effusion.  He was started on colchicine and Lasix.  Pericardial effusion worsened over the last few weeks associate with increasing shortness of breath.  Patient has a history of morbid obesity, hypertension, Combs, new diagnosis of obstructive sleep apnea.  Etiology of pericardial effusion unknown.  Patient undergoing work-up for hip surgery and found to have pericardial effusion and atrial fibrillation during work-up.  He represented to [facility] with worsening shortness of breath noted to be in A-fib with RVR.  He was started on a Cardizem drip and placed on BiPAP.  CT scan of the chest reported large pericardial effusion with bilateral pleural effusions but no tamponade. Case was discussed with CT surgery Dr. Yip who accepted patient in transfer.    He was admitted to the telemetry floor with large pericardial effusion greater than 2 cm per echocardiogram 4/5/2023.  Cardiology, Dr. Eid and CTS Dr. Yip consulted.  Patient received 1 dose of Eliquis on admission and was then placed on hold.  Dr. Yip, CTS noted patient with ascites and history of COMBS and does not want to create true pericardial window as if patient developed worsening portal hypertension and ascites this could cause worsening pericardial effusion.  Dr. Yip believed pericardial effusion amendable to percutaneous drainage.  Dr. Gonsalves cardiology performed pericardiocentesis 4/7/23 with 1150 cc bloody fluid removed.  Fluid sent for for cytologies and cultures results remain pending at discharge.  Patient had an additional 600 mL drainage initially.  Follow-up echocardiogram on 4/8 noted trivial pericardial effusion and significant decrease in size prior to pericardiocentesis. Pericardial tube remained in place until  4/10/2023 and was removed by cardiology after drainage decreased.  Cardiology cleared Eliquis to be resumed on 4/112023.  Patient was cleared for discharge home to follow-up with primary cardiologist Dr. Ch 4/18/2023.  Recommend follow-up echocardiogram to assess pericardial effusion.  Continue colchicine as prior to admission.    Patient has a history of paroxysmal atrial fibrillation and has been in normal sinus rhythm and atrial fibrillation throughout hospital stay.  He remains rate controlled.  Sotalol continued.  Patient was started on heparin while Eliquis placed on hold.  Eliquis restarted on 4/11/2023.    Stage IIIa chronic kidney disease.  Creatinine 1.4 on admission.  Creatinine .72 at discharge.    Suspected obstructive sleep apnea with hypercapnia and probably component of obesity hypoventilation.  Wife indicates patient prescribed BiPAP and oxygen and [facility] to be [facility].  However, BiPAP and oxygen not yet delivered.   contacting [facility] and confirming device.  Pulmonary medicine consulted and discussed with Dr. sEpinoza who notes hypercapnia and suspects FELIX with obesity hypoventilation.  If [facility] requires sleep study then can confirm and get arranged.  Continue BiPAP at night while hospitalized.  [facility] with the [facility] [place] or pulmonary medicine [place].  No additional pulmonary testing recommended at this time. CMNs or any other paperwork involved with arranging for sleep testing or ordering noninvasive respiratory support devices should be routed to his [facility] doctors or other local doctor who will be managing this going forward.    Discussed with LATRICIA Guevara at [facility] on 4/10/2023 and noted BiPAP still not approved and may not be approved prior to discharge if patient medically ready for discharge.  Arranged follow-up with MARGUERITE Paz on [facility].  I received a phone call from LATRICIA Guevara at [facility] on 4/11 as patient was being discharged and she  "advised that [facility] pulmonologist had approved the BiPAP and [facility] would be reaching out to wife regarding equipment.  Discussed this information with wife.    Hypercapnia.  ABGs 4/6/23 pH 7.29, PCO2 71 on 3 L.  Placed on BiPAP.  Pulmonary medicine consulted.  Patient weaned to room air and is long longer requiring supplemental oxygen but continues wearing BiPAP at night for hypercapnia.  Saturation 91-96% on room air.    Amlodipine continued for primary hypertension.  Blood pressure 131/73.    Potassium 3.5 on 4/10 and replaced 40 mEq of potassium.  Follow-up potassium 4 on 4/11.    Physical therapy consulted.  Patient was able to ambulate 160 feet out oxygen with assistance of physical therapy prior to discharge.    Patient continues with lower extremity edema that has improved throughout hospitalization.  I have recommended to keep lower extremities elevated and wear TEDs after discharge.    On 4/11/2023, he is stable for discharge.  Pericardial tube removed on 4/10 per cardiology.  Eliquis resumed on 4/11.  Follow-up with MARGUERITE Paz [facility] on 4/14/2023 at 1430 and follow-up with  4/18/2023 at 2:50 PM.  Recommend follow-up echocardiogram with follow-up appointment.  BiPAP approval per [facility].    Physical Exam on Discharge:  /73 (BP Location: Left arm, Patient Position: Lying)   Pulse 70   Temp 97.8 °F (36.6 °C) (Oral)   Resp 16   Ht 154.9 cm (61\")   Wt 119 kg (263 lb)   SpO2 90%   BMI 49.69 kg/m²   Physical Exam  Vitals and nursing note reviewed.   Constitutional:       Comments: Sitting up on side of bed.  No oxygen in use.  Saturation 90-94%.   HENT:      Head: Atraumatic.      Mouth/Throat:      Pharynx: Oropharynx is clear. No oropharyngeal exudate or posterior oropharyngeal erythema.   Eyes:      Extraocular Movements: Extraocular movements intact.   Cardiovascular:      Rate and Rhythm: Normal rate and regular rhythm.      Heart sounds: No murmur heard.     Comments: Normal sinus " rhythm 67 on telemetry.  Pulmonary:      Breath sounds: No wheezing, rhonchi or rales.      Comments: No oxygen in use.  Abdominal:      General: There is distension (Improved).   Genitourinary:     Comments: Voiding.  Musculoskeletal:         General: Swelling (Feet and ankles bilaterally, improved) present.      Cervical back: Normal range of motion and neck supple.   Skin:     General: Skin is warm and dry.   Neurological:      General: No focal deficit present.      Mental Status: He is alert and oriented to person, place, and time.   Psychiatric:         Mood and Affect: Mood normal.         Behavior: Behavior normal.         Thought Content: Thought content normal.         Judgment: Judgment normal.       Condition on Discharge: Stable    Discharge Disposition: Home with family    Discharge Medications:     Discharge Medications      Continue These Medications      Instructions Start Date   amLODIPine 10 MG tablet  Commonly known as: NORVASC   5 mg, Oral, Daily, 1/2 tablet      apixaban 5 MG tablet tablet  Commonly known as: ELIQUIS   5 mg, Oral, 2 Times Daily      busPIRone 10 MG tablet  Commonly known as: BUSPAR   10 mg, Oral, 2 Times Daily      cholecalciferol 25 MCG (1000 UT) tablet  Commonly known as: VITAMIN D3   1,000 Units, Oral, Daily      colchicine 0.6 MG tablet   0.6 mg, Oral, 2 Times Daily      docusate sodium 100 MG capsule  Commonly known as: COLACE   100 mg, Oral, Daily      fenofibrate 54 MG tablet  Commonly known as: TRICOR   54 mg, Oral, Daily      ferrous sulfate 325 (65 FE) MG tablet   325 mg, Oral, Daily With Breakfast      furosemide 40 MG tablet  Commonly known as: LASIX   40 mg, Oral, 2 Times Daily      mirtazapine 30 MG tablet  Commonly known as: REMERON   15 mg, Oral, Nightly, 1/2 tablet      multivitamin with minerals tablet tablet   1 tablet, Oral, Daily      prazosin 5 MG capsule  Commonly known as: MINIPRESS   5-10 mg, Oral, 2 Times Daily, 1 capsule QAM & 2 capsules QHS       sertraline 100 MG tablet  Commonly known as: ZOLOFT   200 mg, Oral, Daily      sotalol 80 MG tablet  Commonly known as: BETAPACE   80 mg, Oral, 2 Times Daily      traMADol 50 MG tablet  Commonly known as: ULTRAM   50 mg, Oral, 2 Times Daily PRN           Discharge Diet:   Diet Instructions     Diet: Regular/House Diet; Regular Texture (IDDSI 7); Thin (IDDSI 0)      Discharge Diet: Regular/House Diet    Texture: Regular Texture (IDDSI 7)    Fluid Consistency: Thin (IDDSI 0)        Activity at Discharge:   Activity Instructions     Activity as Tolerated      Measure Weight      Other Activity Instructions      Activity Instructions: Keep lower extremities elevated when not ambulating.  Recommend TEDs bilateral lower extremities.         Discharge instructions:  1.  For worsening shortness of breath seek medical attention.  2.  BiPAP at night after approval per [facility].  [facility] currently working on approval.  3.  Keep feet elevated and place TEDs bilateral lower extremities.  4.  Follow-up with MARGUERITE Paz [facility] on 4/14/2023.  5.  Follow-up with Dr. Soria 4/18/2023.  Recommend follow-up echocardiogram    Follow-up Appointments:   Follow-up with MARGUERITE Paz [facility] on 4/14/2023 at 1430  Follow-up with  4/18/2023 at 2:50 PM.  Recommend follow-up echocardiogram    Test Results Pending at Discharge: Cytology pericardiocentesis pending    Electronically signed by MARGUERITE William, 04/11/23, 11:02 CDT.    Time: 35 minutes.  Discussed with Dr. Euceda, MARGUERITE Peguero cardiology, patient, and wife.    The above documentation resulted from a face-to-face encounter by me Virginia EVERETT, [facility].

## 2023-04-11 NOTE — PROGRESS NOTES
PT WAS SLEEPING UPON ENTERING ROOM NOT WEARING THE NPPV--EXPLAINED THE RISK AND BENEFITS OF WEARING PRESCRIBED NPPV WITH LATRICIA PLASCENCIA AT BEDSIDE. PT VOICED UNDERSTANDING BUT CONTINUES TO NOT WEAR AS PRESCRIBED

## 2023-04-11 NOTE — CASE MANAGEMENT/SOCIAL WORK
Continued Stay Note  [facility]     Patient Name: Norm Tracey  MRN: [id number]  Today's Date: 4/11/2023    Admit Date: 4/4/2023    Plan: [facility]   Discharge Plan     Row Name 04/11/23 1041       Plan    Plan [facility]    Final Discharge Disposition Code 01 - home or self-care    Final Note Patient has discharged home, has already left [facility]. Discharge summary is not yet complete.  will check back later and will fax discharge summary to Isidra at [facility] [phone].                Expected Discharge Date and Time     Expected Discharge Date Expected Discharge Time    Apr 11, 2023             TANESHA Raza

## 2023-04-11 NOTE — PAYOR COMM NOTE
"REF:  [id number].    [facility]  JOI,   [phone]  OR  FAX   [phone]     Norm Prado (72 y.o. Male)     Date of Birth   1950    Social Security Number       Address   [address] [place] [address]    Home Phone   [phone]    MRN   [id number]       Jain   Other    Marital Status                               Admission Date   4/4/23    Admission Type   Urgent    Admitting Provider   Sina Euceda MD    Attending Provider       Department, Room/Bed   [facility] 4B, 465/1       Discharge Date   4/11/2023    Discharge Disposition   Home or Self Care    Discharge Destination   Home                            Attending Provider: (none)   Allergies: No Known Allergies    Isolation: None   Infection: None   Code Status: CPR    Ht: 154.9 cm (61\")   Wt: 119 kg (263 lb)    Admission Cmt: None   Principal Problem: Pericardial effusion [I31.39]                 Active Insurance as of 4/4/2023     Primary Coverage     Payor Plan Insurance Group Employer/Plan Group    [facility] [employer] OPTUM      Payor Plan Address Payor Plan Phone Number Payor Plan Fax Number Effective Dates    [address] [phone]  4/4/2023 - None Entered    [facility] [id number]       Subscriber Name Subscriber Birth Date Member ID       NORM PRADO 1950 [id number]           Secondary Coverage     Payor Plan Insurance Group Employer/Plan Group    [facility] MEDICARE REPLACEMENT [facility] MEDICARE REPLACEMENT [id number]     Payor Plan Address Payor Plan Phone Number Payor Plan Fax Number Effective Dates    [address] [address]   1/1/2023 - None Entered    [facility] [id number]       Subscriber Name Subscriber Birth Date Member ID       NORM PRADO 1950 [id number]                 Emergency Contacts      (Rel.) Home Phone Work Phone Mobile Phone    EVEMJ GUERRA (Spouse) -- -- [phone]    RAFAEL KAISER (Relative) -- -- --               Discharge " Summary      Virginia Wells, APRN at 04/11/23 0821                [facility]  DISCHARGE SUMMARY     Date of Admission: 4/4/2023  Date of Discharge:  4/11/2023  Primary Care Physician: Darrick Beltran FNP    Presenting Problem/History of Present Illness:  Shortness of breath    Final Discharge Diagnoses:  Active Hospital Problems    Diagnosis    • **Pericardial effusion    • Hypercapnia    • Essential hypertension    • Suspected obstructive sleep apnea with hypercapnia and probable obesity hypoventilation    • Obesity, morbid, BMI 50 or higher    • Paroxysmal atrial fibrillation    • Stage 3a chronic kidney disease      Consults:   1.  Cardiology, Dr. Eid/Dr. Gonsalves  2.  Dr. Espinoza, pulmonology    Procedures Performed: Pericardiocentesis/7/23, Dr. Gonsalves.  1150 cc bloody fluid removed.    Pertinent Test Results:   Results for orders placed during the hospital encounter of 04/04/23    Adult Transthoracic Echo Limited W/ Cont if Necessary Per Protocol    Interpretation Summary  •  There is a trivial pericardial effusion that is significantly reduced in size compared to the prior study from 4/5/23 s/p pericardial drain.  •  There is thickening of the pericardium present. 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  •  Left ventricular systolic function is normal.  •  The right ventricle is not well visualized but appears probably in size with low normal systolic function.    Transthoracic echocardiogram  4/5/23  •  Left ventricular ejection fraction appears to be 56 - 60%.  •  Left ventricular diastolic function was indeterminate.  •  Left atrial volume is mildly increased.  •  Estimated right ventricular systolic pressure from tricuspid regurgitation is normal (<35 mmHg).  •  There is a large (>2cm) pericardial effusion.  •  Unclear if tamponade. No detailed interrogation performed. No clearcut RV or RA  compression     Imaging Results (All)     Procedure Component Value Units Date/Time    CT Chest Without Contrast Diagnostic [[id number]] Collected: 04/05/23 0913     Updated: 04/05/23 0932    Narrative:      EXAMINATION: CT CHEST WO CONTRAST DIAGNOSTIC-      4/5/2023 8:48 AM CDT     HISTORY: pericardial effusion     In order to have a CT radiation dose as low as reasonably achievable  Automated Exposure Control was utilized for adjustment of the mA and/or  KV according to patient size.     DLP in mGycm= 969     The CT scan of the chest is performed without intravenous contrast  enhancement.     Images are acquired in axial plane and subsequent reconstruction in  coronal and sagittal planes.     There is no previous similar study for comparison.     There is significant respiratory motion artifacts which is noted  diagnostic quality of the study. This may obscure a subtle  nodule/lesion.     The lungs are poorly expanded.     There is a moderate right basal pleural effusion and a trace left basal  pleural effusion.     There is a right lower lobar consolidation adjacent to the pleural  effusion which may suggest compression atelectasis. Mild atelectatic  changes and pleural thickening is seen at the left base posteriorly.     Visualized central airway is patent. The distal airway is poorly  visualized and evaluated due to respiratory motion artifacts. No  significant endobronchial abnormality or a lesion.     The limited visualized soft tissues of the neck are unremarkable.  Probable low-density nodule in the left lobe of the thyroid gland. There  is no axillary lymphadenopathy.     Atheromatous changes of the thoracic aorta noted. No aneurysmal  dilatation. The pulmonary arteries are of normal size and shape. The  coronary arteries are not optimally visualized or evaluated. No  significant calcification the course of the coronary arteries.     There is a large pericardial effusion which measures up to 3.2 cm  in  width.     There is a large Bochdalek hernia with peritoneal fat and infiltration.     The limited visualized unenhanced abdomen show small amount of fluid in  the upper abdomen around the liver and spleen. Exophytic nodule/masses  are seen in the limited visualized kidneys. The gallbladder is small and  contracted. No radiopaque calculi are noted. Pancreas and kidneys are  incompletely included and not evaluated. The abdomen is separately  reviewed and reported with same day CT scan of the abdomen     Images reviewed in bone windows show chronic degenerative changes of the  thoracic spine with a mild levoscoliosis. No acute bony abnormality.  There is accentuated thoracic kyphosis. Hardware fusion of the lower  cervical spine is visualized. No hardware complication. Old healed  fractures of the left ribs are noted.       Impression:      1. A large pericardial effusion.  2. A moderate right and a trace left pleural effusion.  3. Right lower lobar consolidation/compression atelectasis.   This report was finalized on 04/05/2023 09:29 by Dr. Carly Espino MD.    CT Abdomen Pelvis Without Contrast [[id number]] Collected: 04/05/23 0909     Updated: 04/05/23 0922    Narrative:      EXAM/TECHNIQUE: CT abdomen pelvis without contrast     INDICATION: Lymphadenopathy, groin     COMPARISON: None     DLP: 969 mGy cm. Automated exposure control was also utilized to  decrease patient radiation dose.     FINDINGS:     Large pericardial effusion measuring up to 3 cm in thickness. Moderate  RIGHT pleural effusion with atelectasis.     Unenhanced liver appears unremarkable. Gallbladder is decompressed. No  gallstones or biliary ductal dilatation.      Pancreas, spleen, and RIGHT adrenal gland are unremarkable.  Indeterminate 4.0 x 2.3 cm LEFT adrenal gland nodule.      Multiple bilateral low-density renal lesions compatible with cysts. No  renal or ureteral calculi. No hydronephrosis. 1.7 cm calculus layering  in the posterior  midline urinary bladder. Small locules of gas in the  urinary bladder lumen are noted. No focal urinary bladder wall  thickening.     Colonic diverticulosis without evidence of diverticulitis. No colonic  wall thickening or pericolonic fat stranding. No abnormal small bowel  distention or evidence of active small bowel inflammation. No  pneumatosis or evidence of pneumoperitoneum.     Small volume ascites throughout the abdomen and pelvis. No pelvic mass  organized pelvic collection. Prostate is enlarged measuring 5 cm  transverse dimension. Nonaneurysmal atherosclerotic abdominal aorta. No  enlarged abdominal or pelvic lymph nodes. Moderate to large right-sided  hydrocele.     Diffuse body wall soft tissue edema. Severe osteoarthritis in the LEFT  hip. Grade 1 anterolisthesis of L4 on L5.       Impression:         1.  Large pericardial effusion. Close clinical follow-up is recommended.  2.  Moderate RIGHT pleural effusion with overlying atelectasis.  3.  Small volume ascites throughout the abdomen and pelvis.  4.  No abdominal or pelvic lymphadenopathy.  5.  Moderate to large right-sided hydrocele.  6.  1.7 cm calculus in the urinary bladder.  7.  Indeterminate 4 cm LEFT adrenal gland nodule. Differential includes  adrenal gland mass as well as focal adrenal gland hemorrhage. Recommend  follow-up adrenal protocol CT or MR.  8.  Enlarged prostate.  This report was finalized on 04/05/2023 09:19 by Dr. Jacoby Oscar MD.        LAB RESULTS:      Lab 04/11/23  0517 04/10/23  0531 04/09/23  0458 04/08/23  0514 04/07/23  0638 04/06/23  0834 04/05/23  0530   WBC 5.16 3.67 4.33 5.94 13.49* 11.47* 12.81*   HEMOGLOBIN 12.4* 10.9* 10.9* 11.3* 13.2 13.4 12.7*   HEMATOCRIT 41.0 36.4* 35.9* 38.7 43.4 44.6 42.1   PLATELETS 99* 81* 83* 96* 128* 166 132*   NEUTROS ABS 4.06 2.85 3.51 4.93 11.06* 9.51* 11.24*   IMMATURE GRANS (ABS)  --   --   --   --  0.19* 0.08* 0.10*   LYMPHS ABS 0.64* 0.46* 0.43* 0.48* 0.98 0.88 0.59*   MONOS ABS  0.26 0.22 0.23 0.34 0.89 0.77 0.80   EOS ABS 0.15 0.10 0.13 0.14 0.32 0.20 0.06   MCV 93.2 93.8 95.0 97.0 92.7 97.0 95.2   CRP  --   --   --   --   --   --  <0.30         Lab 04/11/23  0517 04/10/23  0531 04/09/23  0458 04/08/23  1120 04/07/23  1009 04/06/23  0954 04/05/23  0530   SODIUM 142 145 146* 144 141   < >  --    POTASSIUM 4.0 3.5 3.6 3.9 4.1   < >  --    CHLORIDE 96* 100 101 104 100   < >  --    CO2 37.0* 40.0* 40.0* 34.0* 34.0*   < >  --    ANION GAP 9.0 5.0 5.0 6.0 7.0   < >  --    BUN 10 13 19 26* 42*   < >  --    CREATININE 0.72* 0.60* 0.76 0.73* 1.05   < >  --    EGFR 96.5 101.9 94.9 96.1 75.0   < >  --    GLUCOSE 128* 101* 110* 126* 109*   < >  --    CALCIUM 8.7 8.4* 8.4* 8.5* 8.9   < >  --    TSH  --   --   --   --   --   --  2.720    < > = values in this interval not displayed.         Lab 04/06/23  0954   TOTAL PROTEIN 6.7   ALBUMIN 4.2   GLOBULIN 2.5   ALT (SGPT) 31   AST (SGOT) 17   BILIRUBIN 0.5   ALK PHOS 67                     Lab 04/06/23  1710 04/06/23  1547   PH, ARTERIAL 7.312* 7.297*   PCO2, ARTERIAL 69.2* 71.2*   PO2 ART 79.9* 79.8*   O2 SATURATION ART 96.3 95.8   FIO2 30  --    HCO3 ART 34.9* 34.8*   BASE EXCESS ART 6.5* 6.1*     Brief Urine Lab Results     None        Microbiology Results (last 10 days)     Procedure Component Value - Date/Time    Body Fluid Culture - Body Fluid, Pericardium [[id number]] Collected: 04/07/23 1253    Lab Status: Preliminary result Specimen: Body Fluid from Pericardium Updated: 04/11/23 0622     Body Fluid Culture No growth at 4 days     Gram Stain Moderate (3+) WBCs seen      No organisms seen    AFB Culture - Body Fluid, Pericardium [[id number]] Collected: 04/07/23 1253    Lab Status: Preliminary result Specimen: Body Fluid from Pericardium Updated: 04/08/23 0841     AFB Stain No acid fast bacilli seen on direct smear      No acid fast bacilli seen on concentrated smear        Hospital Course: Mr. Tracey transferred from [facility] 4/4/23 with  worsening shortness of breath, atrial fibrillation with RVR on sotalol and Eliquis, and CT scan showing large pericardial effusion.  Patient treated for pericarditis as outpatient by Dr. Soria and was originally referred to him for new onset atrial fibrillation and found to have pericardial effusion.  He was started on colchicine and Lasix.  Pericardial effusion worsened over the last few weeks associate with increasing shortness of breath.  Patient has a history of morbid obesity, hypertension, Combs, new diagnosis of obstructive sleep apnea.  Etiology of pericardial effusion unknown.  Patient undergoing work-up for hip surgery and found to have pericardial effusion and atrial fibrillation during work-up.  He represented to [facility] with worsening shortness of breath noted to be in A-fib with RVR.  He was started on a Cardizem drip and placed on BiPAP.  CT scan of the chest reported large pericardial effusion with bilateral pleural effusions but no tamponade. Case was discussed with CT surgery Dr. Yip who accepted patient in transfer.    He was admitted to the telemetry floor with large pericardial effusion greater than 2 cm per echocardiogram 4/5/2023.  Cardiology, Dr. Eid and CTS Dr. Yip consulted.  Patient received 1 dose of Eliquis on admission and was then placed on hold.  Dr. Yip, CTS noted patient with ascites and history of COMBS and does not want to create true pericardial window as if patient developed worsening portal hypertension and ascites this could cause worsening pericardial effusion.  Dr. Yip believed pericardial effusion amendable to percutaneous drainage.  Dr. Gonsalves cardiology performed pericardiocentesis 4/7/23 with 1150 cc bloody fluid removed.  Fluid sent for for cytologies and cultures results remain pending at discharge.  Patient had an additional 600 mL drainage initially.  Follow-up echocardiogram on 4/8 noted trivial pericardial effusion and significant decrease in  size prior to pericardiocentesis. Pericardial tube remained in place until 4/10/2023 and was removed by cardiology after drainage decreased.  Cardiology cleared Eliquis to be resumed on 4/112023.  Patient was cleared for discharge home to follow-up with primary cardiologist Dr. Ch 4/18/2023.  Recommend follow-up echocardiogram to assess pericardial effusion.  Continue colchicine as prior to admission.    Patient has a history of paroxysmal atrial fibrillation and has been in normal sinus rhythm and atrial fibrillation throughout hospital stay.  He remains rate controlled.  Sotalol continued.  Patient was started on heparin while Eliquis placed on hold.  Eliquis restarted on 4/11/2023.    Stage IIIa chronic kidney disease.  Creatinine 1.4 on admission.  Creatinine .72 at discharge.    Suspected obstructive sleep apnea with hypercapnia and probably component of obesity hypoventilation.  Wife indicates patient prescribed BiPAP and oxygen and [facility] to be [facility].  However, BiPAP and oxygen not yet delivered.   contacting [facility] and confirming device.  Pulmonary medicine consulted and discussed with Dr. Espinoza who notes hypercapnia and suspects FELIX with obesity hypoventilation.  If [facility] requires sleep study then can confirm and get arranged.  Continue BiPAP at night while hospitalized.  [facility] with the [facility] [place] or pulmonary medicine [place].  No additional pulmonary testing recommended at this time. CMNs or any other paperwork involved with arranging for sleep testing or ordering noninvasive respiratory support devices should be routed to his [facility] doctors or other local doctor who will be managing this going forward.    Discussed with LATRICIA Guevara at [facility] clinic on 4/10/2023 and noted BiPAP still not approved and may not be approved prior to discharge if patient medically ready for discharge.  Arranged follow-up with MARGUERITE Paz on [facility].  I received a phone call from  "Paola RN at [facility] clinic on 4/11 as patient was being discharged and she advised that [facility] pulmonologist had approved the BiPAP and [facility] would be reaching out to wife regarding equipment.  Discussed this information with wife.    Hypercapnia.  ABGs 4/6/23 pH 7.29, PCO2 71 on 3 L.  Placed on BiPAP.  Pulmonary medicine consulted.  Patient weaned to room air and is long longer requiring supplemental oxygen but continues wearing BiPAP at night for hypercapnia.  Saturation 91-96% on room air.    Amlodipine continued for primary hypertension.  Blood pressure 131/73.    Potassium 3.5 on 4/10 and replaced 40 mEq of potassium.  Follow-up potassium 4 on 4/11.    Physical therapy consulted.  Patient was able to ambulate 160 feet out oxygen with assistance of physical therapy prior to discharge.    Patient continues with lower extremity edema that has improved throughout hospitalization.  I have recommended to keep lower extremities elevated and wear TEDs after discharge.    On 4/11/2023, he is stable for discharge.  Pericardial tube removed on 4/10 per cardiology.  Eliquis resumed on 4/11.  Follow-up with MARGUERITE Paz, [facility] on 4/14/2023 at 1430 and follow-up with  4/18/2023 at 2:50 PM.  Recommend follow-up echocardiogram with follow-up appointment.  BiPAP approval per [facility].    Physical Exam on Discharge:  /73 (BP Location: Left arm, Patient Position: Lying)   Pulse 70   Temp 97.8 °F (36.6 °C) (Oral)   Resp 16   Ht 154.9 cm (61\")   Wt 119 kg (263 lb)   SpO2 90%   BMI 49.69 kg/m²   Physical Exam  Vitals and nursing note reviewed.   Constitutional:       Comments: Sitting up on side of bed.  No oxygen in use.  Saturation 90-94%.   HENT:      Head: Atraumatic.      Mouth/Throat:      Pharynx: Oropharynx is clear. No oropharyngeal exudate or posterior oropharyngeal erythema.   Eyes:      Extraocular Movements: Extraocular movements intact.   Cardiovascular:      Rate and Rhythm: Normal rate and " regular rhythm.      Heart sounds: No murmur heard.     Comments: Normal sinus rhythm 67 on telemetry.  Pulmonary:      Breath sounds: No wheezing, rhonchi or rales.      Comments: No oxygen in use.  Abdominal:      General: There is distension (Improved).   Genitourinary:     Comments: Voiding.  Musculoskeletal:         General: Swelling (Feet and ankles bilaterally, improved) present.      Cervical back: Normal range of motion and neck supple.   Skin:     General: Skin is warm and dry.   Neurological:      General: No focal deficit present.      Mental Status: He is alert and oriented to person, place, and time.   Psychiatric:         Mood and Affect: Mood normal.         Behavior: Behavior normal.         Thought Content: Thought content normal.         Judgment: Judgment normal.       Condition on Discharge: Stable    Discharge Disposition: Home with family    Discharge Medications:     Discharge Medications      Continue These Medications      Instructions Start Date   amLODIPine 10 MG tablet  Commonly known as: NORVASC   5 mg, Oral, Daily, 1/2 tablet      apixaban 5 MG tablet tablet  Commonly known as: ELIQUIS   5 mg, Oral, 2 Times Daily      busPIRone 10 MG tablet  Commonly known as: BUSPAR   10 mg, Oral, 2 Times Daily      cholecalciferol 25 MCG (1000 UT) tablet  Commonly known as: VITAMIN D3   1,000 Units, Oral, Daily      colchicine 0.6 MG tablet   0.6 mg, Oral, 2 Times Daily      docusate sodium 100 MG capsule  Commonly known as: COLACE   100 mg, Oral, Daily      fenofibrate 54 MG tablet  Commonly known as: TRICOR   54 mg, Oral, Daily      ferrous sulfate 325 (65 FE) MG tablet   325 mg, Oral, Daily With Breakfast      furosemide 40 MG tablet  Commonly known as: LASIX   40 mg, Oral, 2 Times Daily      mirtazapine 30 MG tablet  Commonly known as: REMERON   15 mg, Oral, Nightly, 1/2 tablet      multivitamin with minerals tablet tablet   1 tablet, Oral, Daily      prazosin 5 MG capsule  Commonly known as:  MINIPRESS   5-10 mg, Oral, 2 Times Daily, 1 capsule QAM & 2 capsules QHS      sertraline 100 MG tablet  Commonly known as: ZOLOFT   200 mg, Oral, Daily      sotalol 80 MG tablet  Commonly known as: BETAPACE   80 mg, Oral, 2 Times Daily      traMADol 50 MG tablet  Commonly known as: ULTRAM   50 mg, Oral, 2 Times Daily PRN           Discharge Diet:   Diet Instructions     Diet: Regular/House Diet; Regular Texture (IDDSI 7); Thin (IDDSI 0)      Discharge Diet: Regular/House Diet    Texture: Regular Texture (IDDSI 7)    Fluid Consistency: Thin (IDDSI 0)        Activity at Discharge:   Activity Instructions     Activity as Tolerated      Measure Weight      Other Activity Instructions      Activity Instructions: Keep lower extremities elevated when not ambulating.  Recommend TEDs bilateral lower extremities.         Discharge instructions:  1.  For worsening shortness of breath seek medical attention.  2.  BiPAP at night after approval per [facility].  [facility] currently working on approval.  3.  Keep feet elevated and place TEDs bilateral lower extremities.  4.  Follow-up with MARGUERITE Paz [facility] on 4/14/2023.  5.  Follow-up with Dr. Soria 4/18/2023.  Recommend follow-up echocardiogram    Follow-up Appointments:   Follow-up with MARGUERITE Paz [facility] on 4/14/2023 at 1430  Follow-up with  4/18/2023 at 2:50 PM.  Recommend follow-up echocardiogram    Test Results Pending at Discharge: Cytology pericardiocentesis pending    Electronically signed by MARGUERITE William, 04/11/23, 11:02 CDT.    Time: 35 minutes.  Discussed with Dr. Euceda, MARGUERITE Peguero cardiology, patient, and wife.    The above documentation resulted from a face-to-face encounter by me Virginia EVERETT, [facility].          Electronically signed by Virginia Wells APRN at 04/11/23 1125       Discharge Order (From admission, onward)     Start     Ordered    04/11/23 0827  Discharge patient  Once        Expected  Discharge Date: 04/11/23    Discharge Disposition: Home or Self Care    Physician of Record for Attribution - Please select from Treatment Team: JAMIN MCKEON [[id number]]    Review needed by CMO to determine Physician of Record: No       Question Answer Comment   Physician of Record for Attribution - Please select from Treatment Team JAMIN MCKEON    Review needed by CMO to determine Physician of Record No        04/11/23 0833

## 2023-04-11 NOTE — PAYOR COMM NOTE
"REF:  [facility] [id number]    [facility]  JOI,  [phone]  OR  FAX   [phone]     Norm Prado (72 y.o. Male)     Date of Birth   1950    Social Security Number       Address   [address] [place] [address]    Home Phone   [phone]    MRN   [id number]       Jehovah's witness   Other    Marital Status                               Admission Date   4/4/23    Admission Type   Urgent    Admitting Provider   Sina Euceda MD    Attending Provider       Department, Room/Bed   [facility] 4B, 465/1       Discharge Date   4/11/2023    Discharge Disposition   Home or Self Care    Discharge Destination   Home                            Attending Provider: (none)   Allergies: No Known Allergies    Isolation: None   Infection: None   Code Status: CPR    Ht: 154.9 cm (61\")   Wt: 119 kg (263 lb)    Admission Cmt: None   Principal Problem: Pericardial effusion [I31.39]                 Active Insurance as of 4/4/2023     Primary Coverage     Payor Plan Insurance Group Employer/Plan Group    [facility] CCN OPTUM      Payor Plan Address Payor Plan Phone Number Payor Plan Fax Number Effective Dates    [address] [phone]  4/4/2023 - None Entered    [facility] [id number]       Subscriber Name Subscriber Birth Date Member ID       NORM PRADO 1950 [id number]           Secondary Coverage     Payor Plan Insurance Group Employer/Plan Group    [facility] MEDICARE REPLACEMENT [facility] MEDICARE REPLACEMENT [id number]     Payor Plan Address Payor Plan Phone Number Payor Plan Fax Number Effective Dates    [address] [address]   1/1/2023 - None Entered    [facility] [id number]       Subscriber Name Subscriber Birth Date Member ID       NORM PRADO 1950 [id number]                 Emergency Contacts      (Rel.) Home Phone Work Phone Mobile Phone    EVEMJ GUERRA (Spouse) -- -- [phone]    RAFAEL KAISER (Relative) -- -- --               Discharge " Summary      Virginia Wells, APRN at 04/11/23 0821                [facility] Medicine Services  DISCHARGE SUMMARY     Date of Admission: 4/4/2023  Date of Discharge:  4/11/2023  Primary Care Physician: Darrick Beltran FNP    Presenting Problem/History of Present Illness:  Shortness of breath    Final Discharge Diagnoses:  Active Hospital Problems    Diagnosis    • **Pericardial effusion    • Hypercapnia    • Essential hypertension    • Suspected obstructive sleep apnea with hypercapnia and probable obesity hypoventilation    • Obesity, morbid, BMI 50 or higher    • Paroxysmal atrial fibrillation    • Stage 3a chronic kidney disease      Consults:   1.  Cardiology, Dr. Eid/Dr. Gonsalves  2.  Dr. Espinoza, pulmonology    Procedures Performed: Pericardiocentesis/7/23, Dr. Gonsalves.  1150 cc bloody fluid removed.    Pertinent Test Results:   Results for orders placed during the hospital encounter of 04/04/23    Adult Transthoracic Echo Limited W/ Cont if Necessary Per Protocol    Interpretation Summary  •  There is a trivial pericardial effusion that is significantly reduced in size compared to the prior study from 4/5/23 s/p pericardial drain.  •  There is thickening of the pericardium present. 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  •  Left ventricular systolic function is normal.  •  The right ventricle is not well visualized but appears probably in size with low normal systolic function.    Transthoracic echocardiogram  4/5/23  •  Left ventricular ejection fraction appears to be 56 - 60%.  •  Left ventricular diastolic function was indeterminate.  •  Left atrial volume is mildly increased.  •  Estimated right ventricular systolic pressure from tricuspid regurgitation is normal (<35 mmHg).  •  There is a large (>2cm) pericardial effusion.  •  Unclear if tamponade. No detailed interrogation performed. No clearcut RV or RA  compression     Imaging Results (All)     Procedure Component Value Units Date/Time    CT Chest Without Contrast Diagnostic [[id number]] Collected: 04/05/23 0913     Updated: 04/05/23 0932    Narrative:      EXAMINATION: CT CHEST WO CONTRAST DIAGNOSTIC-      4/5/2023 8:48 AM CDT     HISTORY: pericardial effusion     In order to have a CT radiation dose as low as reasonably achievable  Automated Exposure Control was utilized for adjustment of the mA and/or  KV according to patient size.     DLP in mGycm= 969     The CT scan of the chest is performed without intravenous contrast  enhancement.     Images are acquired in axial plane and subsequent reconstruction in  coronal and sagittal planes.     There is no previous similar study for comparison.     There is significant respiratory motion artifacts which is noted  diagnostic quality of the study. This may obscure a subtle  nodule/lesion.     The lungs are poorly expanded.     There is a moderate right basal pleural effusion and a trace left basal  pleural effusion.     There is a right lower lobar consolidation adjacent to the pleural  effusion which may suggest compression atelectasis. Mild atelectatic  changes and pleural thickening is seen at the left base posteriorly.     Visualized central airway is patent. The distal airway is poorly  visualized and evaluated due to respiratory motion artifacts. No  significant endobronchial abnormality or a lesion.     The limited visualized soft tissues of the neck are unremarkable.  Probable low-density nodule in the left lobe of the thyroid gland. There  is no axillary lymphadenopathy.     Atheromatous changes of the thoracic aorta noted. No aneurysmal  dilatation. The pulmonary arteries are of normal size and shape. The  coronary arteries are not optimally visualized or evaluated. No  significant calcification the course of the coronary arteries.     There is a large pericardial effusion which measures up to 3.2 cm  in  width.     There is a large Bochdalek hernia with peritoneal fat and infiltration.     The limited visualized unenhanced abdomen show small amount of fluid in  the upper abdomen around the liver and spleen. Exophytic nodule/masses  are seen in the limited visualized kidneys. The gallbladder is small and  contracted. No radiopaque calculi are noted. Pancreas and kidneys are  incompletely included and not evaluated. The abdomen is separately  reviewed and reported with same day CT scan of the abdomen     Images reviewed in bone windows show chronic degenerative changes of the  thoracic spine with a mild levoscoliosis. No acute bony abnormality.  There is accentuated thoracic kyphosis. Hardware fusion of the lower  cervical spine is visualized. No hardware complication. Old healed  fractures of the left ribs are noted.       Impression:      1. A large pericardial effusion.  2. A moderate right and a trace left pleural effusion.  3. Right lower lobar consolidation/compression atelectasis.   This report was finalized on 04/05/2023 09:29 by Dr. Carly Espino MD.    CT Abdomen Pelvis Without Contrast [[id number]] Collected: 04/05/23 0909     Updated: 04/05/23 0922    Narrative:      EXAM/TECHNIQUE: CT abdomen pelvis without contrast     INDICATION: Lymphadenopathy, groin     COMPARISON: None     DLP: 969 mGy cm. Automated exposure control was also utilized to  decrease patient radiation dose.     FINDINGS:     Large pericardial effusion measuring up to 3 cm in thickness. Moderate  RIGHT pleural effusion with atelectasis.     Unenhanced liver appears unremarkable. Gallbladder is decompressed. No  gallstones or biliary ductal dilatation.      Pancreas, spleen, and RIGHT adrenal gland are unremarkable.  Indeterminate 4.0 x 2.3 cm LEFT adrenal gland nodule.      Multiple bilateral low-density renal lesions compatible with cysts. No  renal or ureteral calculi. No hydronephrosis. 1.7 cm calculus layering  in the posterior  midline urinary bladder. Small locules of gas in the  urinary bladder lumen are noted. No focal urinary bladder wall  thickening.     Colonic diverticulosis without evidence of diverticulitis. No colonic  wall thickening or pericolonic fat stranding. No abnormal small bowel  distention or evidence of active small bowel inflammation. No  pneumatosis or evidence of pneumoperitoneum.     Small volume ascites throughout the abdomen and pelvis. No pelvic mass  organized pelvic collection. Prostate is enlarged measuring 5 cm  transverse dimension. Nonaneurysmal atherosclerotic abdominal aorta. No  enlarged abdominal or pelvic lymph nodes. Moderate to large right-sided  hydrocele.     Diffuse body wall soft tissue edema. Severe osteoarthritis in the LEFT  hip. Grade 1 anterolisthesis of L4 on L5.       Impression:         1.  Large pericardial effusion. Close clinical follow-up is recommended.  2.  Moderate RIGHT pleural effusion with overlying atelectasis.  3.  Small volume ascites throughout the abdomen and pelvis.  4.  No abdominal or pelvic lymphadenopathy.  5.  Moderate to large right-sided hydrocele.  6.  1.7 cm calculus in the urinary bladder.  7.  Indeterminate 4 cm LEFT adrenal gland nodule. Differential includes  adrenal gland mass as well as focal adrenal gland hemorrhage. Recommend  follow-up adrenal protocol CT or MR.  8.  Enlarged prostate.  This report was finalized on 04/05/2023 09:19 by Dr. Jacoby Oscar MD.        LAB RESULTS:      Lab 04/11/23  0517 04/10/23  0531 04/09/23  0458 04/08/23  0514 04/07/23  0638 04/06/23  0834 04/05/23  0530   WBC 5.16 3.67 4.33 5.94 13.49* 11.47* 12.81*   HEMOGLOBIN 12.4* 10.9* 10.9* 11.3* 13.2 13.4 12.7*   HEMATOCRIT 41.0 36.4* 35.9* 38.7 43.4 44.6 42.1   PLATELETS 99* 81* 83* 96* 128* 166 132*   NEUTROS ABS 4.06 2.85 3.51 4.93 11.06* 9.51* 11.24*   IMMATURE GRANS (ABS)  --   --   --   --  0.19* 0.08* 0.10*   LYMPHS ABS 0.64* 0.46* 0.43* 0.48* 0.98 0.88 0.59*   MONOS ABS  0.26 0.22 0.23 0.34 0.89 0.77 0.80   EOS ABS 0.15 0.10 0.13 0.14 0.32 0.20 0.06   MCV 93.2 93.8 95.0 97.0 92.7 97.0 95.2   CRP  --   --   --   --   --   --  <0.30         Lab 04/11/23  0517 04/10/23  0531 04/09/23  0458 04/08/23  1120 04/07/23  1009 04/06/23  0954 04/05/23  0530   SODIUM 142 145 146* 144 141   < >  --    POTASSIUM 4.0 3.5 3.6 3.9 4.1   < >  --    CHLORIDE 96* 100 101 104 100   < >  --    CO2 37.0* 40.0* 40.0* 34.0* 34.0*   < >  --    ANION GAP 9.0 5.0 5.0 6.0 7.0   < >  --    BUN 10 13 19 26* 42*   < >  --    CREATININE 0.72* 0.60* 0.76 0.73* 1.05   < >  --    EGFR 96.5 101.9 94.9 96.1 75.0   < >  --    GLUCOSE 128* 101* 110* 126* 109*   < >  --    CALCIUM 8.7 8.4* 8.4* 8.5* 8.9   < >  --    TSH  --   --   --   --   --   --  2.720    < > = values in this interval not displayed.         Lab 04/06/23  0954   TOTAL PROTEIN 6.7   ALBUMIN 4.2   GLOBULIN 2.5   ALT (SGPT) 31   AST (SGOT) 17   BILIRUBIN 0.5   ALK PHOS 67                     Lab 04/06/23  1710 04/06/23  1547   PH, ARTERIAL 7.312* 7.297*   PCO2, ARTERIAL 69.2* 71.2*   PO2 ART 79.9* 79.8*   O2 SATURATION ART 96.3 95.8   FIO2 30  --    HCO3 ART 34.9* 34.8*   BASE EXCESS ART 6.5* 6.1*     Brief Urine Lab Results     None        Microbiology Results (last 10 days)     Procedure Component Value - Date/Time    Body Fluid Culture - Body Fluid, Pericardium [[id number]] Collected: 04/07/23 1253    Lab Status: Preliminary result Specimen: Body Fluid from Pericardium Updated: 04/11/23 0622     Body Fluid Culture No growth at 4 days     Gram Stain Moderate (3+) WBCs seen      No organisms seen    AFB Culture - Body Fluid, Pericardium [[id number]] Collected: 04/07/23 1253    Lab Status: Preliminary result Specimen: Body Fluid from Pericardium Updated: 04/08/23 0841     AFB Stain No acid fast bacilli seen on direct smear      No acid fast bacilli seen on concentrated smear        Hospital Course: Mr. Tracey transferred from [facility] 4/4/23 with  worsening shortness of breath, atrial fibrillation with RVR on sotalol and Eliquis, and CT scan showing large pericardial effusion.  Patient treated for pericarditis as outpatient by Dr. Soria and was originally referred to him for new onset atrial fibrillation and found to have pericardial effusion.  He was started on colchicine and Lasix.  Pericardial effusion worsened over the last few weeks associate with increasing shortness of breath.  Patient has a history of morbid obesity, hypertension, Combs, new diagnosis of obstructive sleep apnea.  Etiology of pericardial effusion unknown.  Patient undergoing work-up for hip surgery and found to have pericardial effusion and atrial fibrillation during work-up.  He represented to [facility] with worsening shortness of breath noted to be in A-fib with RVR.  He was started on a Cardizem drip and placed on BiPAP.  CT scan of the chest reported large pericardial effusion with bilateral pleural effusions but no tamponade. Case was discussed with CT surgery Dr. Yip who accepted patient in transfer.    He was admitted to the telemetry floor with large pericardial effusion greater than 2 cm per echocardiogram 4/5/2023.  Cardiology, Dr. Eid and CTS Dr. Yip consulted.  Patient received 1 dose of Eliquis on admission and was then placed on hold.  Dr. Yip, CTS noted patient with ascites and history of COMBS and does not want to create true pericardial window as if patient developed worsening portal hypertension and ascites this could cause worsening pericardial effusion.  Dr. Yip believed pericardial effusion amendable to percutaneous drainage.  Dr. Gonsalves cardiology performed pericardiocentesis 4/7/23 with 1150 cc bloody fluid removed.  Fluid sent for for cytologies and cultures results remain pending at discharge.  Patient had an additional 600 mL drainage initially.  Follow-up echocardiogram on 4/8 noted trivial pericardial effusion and significant decrease in  size prior to pericardiocentesis. Pericardial tube remained in place until 4/10/2023 and was removed by cardiology after drainage decreased.  Cardiology cleared Eliquis to be resumed on 4/112023.  Patient was cleared for discharge home to follow-up with primary cardiologist Dr. Ch 4/18/2023.  Recommend follow-up echocardiogram to assess pericardial effusion.  Continue colchicine as prior to admission.    Patient has a history of paroxysmal atrial fibrillation and has been in normal sinus rhythm and atrial fibrillation throughout hospital stay.  He remains rate controlled.  Sotalol continued.  Patient was started on heparin while Eliquis placed on hold.  Eliquis restarted on 4/11/2023.    Stage IIIa chronic kidney disease.  Creatinine 1.4 on admission.  Creatinine .72 at discharge.    Suspected obstructive sleep apnea with hypercapnia and probably component of obesity hypoventilation.  Wife indicates patient prescribed BiPAP and oxygen and [facility] to be delivering device.  However, BiPAP and oxygen not yet delivered.   contacting [facility] and confirming device.  Pulmonary medicine consulted and discussed with Dr. Espinoza who notes hypercapnia and suspects FELIX with obesity hypoventilation.  If [facility] requires sleep study then can confirm and get arranged.  Continue BiPAP at night while hospitalized.  [facility]up with the [facility] in [place] or pulmonary medicine [place].  No additional pulmonary testing recommended at this time. CMNs or any other paperwork involved with arranging for sleep testing or ordering noninvasive respiratory support devices should be routed to his [facility] doctors or other local doctor who will be managing this going forward.    Discussed with LATRICIA Guevara at [facility] clinic on 4/10/2023 and noted BiPAP still not approved and may not be approved prior to discharge if patient medically ready for discharge.  Arranged follow-up with MARGUERITE Paz on [facility] the [facility].  I received a phone call from  "Paola RN at [facility] clinic on 4/11 as patient was being discharged and she advised that [facility] pulmonologist had approved the BiPAP and [facility] would be reaching out to wife regarding equipment.  Discussed this information with wife.    Hypercapnia.  ABGs 4/6/23 pH 7.29, PCO2 71 on 3 L.  Placed on BiPAP.  Pulmonary medicine consulted.  Patient weaned to room air and is long longer requiring supplemental oxygen but continues wearing BiPAP at night for hypercapnia.  Saturation 91-96% on room air.    Amlodipine continued for primary hypertension.  Blood pressure 131/73.    Potassium 3.5 on 4/10 and replaced 40 mEq of potassium.  Follow-up potassium 4 on 4/11.    Physical therapy consulted.  Patient was able to ambulate 160 feet out oxygen with assistance of physical therapy prior to discharge.    Patient continues with lower extremity edema that has improved throughout hospitalization.  I have recommended to keep lower extremities elevated and wear TEDs after discharge.    On 4/11/2023, he is stable for discharge.  Pericardial tube removed on 4/10 per cardiology.  Eliquis resumed on 4/11.  Follow-up with MARGUERITE Paz, [facility] on 4/14/2023 at 1430 and follow-up with  4/18/2023 at 2:50 PM.  Recommend follow-up echocardiogram with follow-up appointment.  BiPAP approval per [facility].    Physical Exam on Discharge:  /73 (BP Location: Left arm, Patient Position: Lying)   Pulse 70   Temp 97.8 °F (36.6 °C) (Oral)   Resp 16   Ht 154.9 cm (61\")   Wt 119 kg (263 lb)   SpO2 90%   BMI 49.69 kg/m²   Physical Exam  Vitals and nursing note reviewed.   Constitutional:       Comments: Sitting up on side of bed.  No oxygen in use.  Saturation 90-94%.   HENT:      Head: Atraumatic.      Mouth/Throat:      Pharynx: Oropharynx is clear. No oropharyngeal exudate or posterior oropharyngeal erythema.   Eyes:      Extraocular Movements: Extraocular movements intact.   Cardiovascular:      Rate and Rhythm: Normal rate and " regular rhythm.      Heart sounds: No murmur heard.     Comments: Normal sinus rhythm 67 on telemetry.  Pulmonary:      Breath sounds: No wheezing, rhonchi or rales.      Comments: No oxygen in use.  Abdominal:      General: There is distension (Improved).   Genitourinary:     Comments: Voiding.  Musculoskeletal:         General: Swelling (Feet and ankles bilaterally, improved) present.      Cervical back: Normal range of motion and neck supple.   Skin:     General: Skin is warm and dry.   Neurological:      General: No focal deficit present.      Mental Status: He is alert and oriented to person, place, and time.   Psychiatric:         Mood and Affect: Mood normal.         Behavior: Behavior normal.         Thought Content: Thought content normal.         Judgment: Judgment normal.       Condition on Discharge: Stable    Discharge Disposition: Home with family    Discharge Medications:     Discharge Medications      Continue These Medications      Instructions Start Date   amLODIPine 10 MG tablet  Commonly known as: NORVASC   5 mg, Oral, Daily, 1/2 tablet      apixaban 5 MG tablet tablet  Commonly known as: ELIQUIS   5 mg, Oral, 2 Times Daily      busPIRone 10 MG tablet  Commonly known as: BUSPAR   10 mg, Oral, 2 Times Daily      cholecalciferol 25 MCG (1000 UT) tablet  Commonly known as: VITAMIN D3   1,000 Units, Oral, Daily      colchicine 0.6 MG tablet   0.6 mg, Oral, 2 Times Daily      docusate sodium 100 MG capsule  Commonly known as: COLACE   100 mg, Oral, Daily      fenofibrate 54 MG tablet  Commonly known as: TRICOR   54 mg, Oral, Daily      ferrous sulfate 325 (65 FE) MG tablet   325 mg, Oral, Daily With Breakfast      furosemide 40 MG tablet  Commonly known as: LASIX   40 mg, Oral, 2 Times Daily      mirtazapine 30 MG tablet  Commonly known as: REMERON   15 mg, Oral, Nightly, 1/2 tablet      multivitamin with minerals tablet tablet   1 tablet, Oral, Daily      prazosin 5 MG capsule  Commonly known as:  MINIPRESS   5-10 mg, Oral, 2 Times Daily, 1 capsule QAM & 2 capsules QHS      sertraline 100 MG tablet  Commonly known as: ZOLOFT   200 mg, Oral, Daily      sotalol 80 MG tablet  Commonly known as: BETAPACE   80 mg, Oral, 2 Times Daily      traMADol 50 MG tablet  Commonly known as: ULTRAM   50 mg, Oral, 2 Times Daily PRN           Discharge Diet:   Diet Instructions     Diet: Regular/House Diet; Regular Texture (IDDSI 7); Thin (IDDSI 0)      Discharge Diet: Regular/House Diet    Texture: Regular Texture (IDDSI 7)    Fluid Consistency: Thin (IDDSI 0)        Activity at Discharge:   Activity Instructions     Activity as Tolerated      Measure Weight      Other Activity Instructions      Activity Instructions: Keep lower extremities elevated when not ambulating.  Recommend TEDs bilateral lower extremities.         Discharge instructions:  1.  For worsening shortness of breath seek medical attention.  2.  BiPAP at night after approval per [facility].  [facility] currently working on approval.  3.  Keep feet elevated and place TEDs bilateral lower extremities.  4.  Follow-up with MARGUERITE Paz [facility] on 4/14/2023.  5.  Follow-up with Dr. Soria 4/18/2023.  Recommend follow-up echocardiogram    Follow-up Appointments:   Follow-up with MARGUERITE Paz [facility] on 4/14/2023 at 1430  Follow-up with  4/18/2023 at 2:50 PM.  Recommend follow-up echocardiogram    Test Results Pending at Discharge: Cytology pericardiocentesis pending    Electronically signed by MARGUERITE William, 04/11/23, 11:02 CDT.    Time: 35 minutes.  Discussed with Dr. Euceda, MARGUERITE Peguero cardiology, patient, and wife.    The above documentation resulted from a face-to-face encounter by me Virginia EVERETT, [facility].          Electronically signed by Virginia Wells APRN at 04/11/23 1125       Discharge Order (From admission, onward)     Start     Ordered    04/11/23 0827  Discharge patient  Once        Expected  Discharge Date: 04/11/23    Discharge Disposition: Home or Self Care    Physician of Record for Attribution - Please select from Treatment Team: JAMIN MCKEON [[id number]]    Review needed by CMO to determine Physician of Record: No       Question Answer Comment   Physician of Record for Attribution - Please select from Treatment Team JAMIN MCKEON    Review needed by CMO to determine Physician of Record No        04/11/23 0833

## 2023-04-12 LAB
BACTERIA FLD CULT: NORMAL
CYTO UR: NORMAL
GRAM STN SPEC: NORMAL
GRAM STN SPEC: NORMAL
LAB AP CASE REPORT: NORMAL
LAB AP DIAGNOSIS COMMENT: NORMAL
Lab: NORMAL
PATH REPORT.FINAL DX SPEC: NORMAL
PATH REPORT.GROSS SPEC: NORMAL

## 2023-04-12 NOTE — OUTREACH NOTE
Prep Survey    Flowsheet Row Responses   [facility] facility patient discharged from? [facility]   Is LACE score < 7 ? No   Eligibility Readm Mgmt   Discharge diagnosis **Pericardial effusion   Does the patient have one of the following disease processes/diagnoses(primary or secondary)? Other   Does the patient have Home health ordered? No   Is there a DME ordered? Yes   What DME was ordered? [facility]   Prep survey completed? Yes          Narcisa SALINAS - Registered Nurse

## 2023-04-13 ENCOUNTER — READMISSION MANAGEMENT (OUTPATIENT)
Dept: CALL CENTER | Facility: HOSPITAL | Age: 73
End: 2023-04-13
Payer: MEDICARE

## 2023-04-13 NOTE — OUTREACH NOTE
Medical Week 1 Survey    Flowsheet Row Responses   [facility] patient discharged from? [place]   Does the patient have one of the following disease processes/diagnoses(primary or secondary)? Other   Week 1 attempt successful? Yes   Call start time 1609   Call end time 1610   Discharge diagnosis **Pericardial effusion   Is patient permission given to speak with other caregiver? Yes   List who call center can speak with Roselia spouse    Person spoke with today (if not patient) and relationship Roselia spouse   Meds reviewed with patient/caregiver? Yes   Is the patient having any side effects they believe may be caused by any medication additions or changes? No   Does the patient have all medications ordered at discharge? Yes   Is the patient taking all medications as directed (includes completed medication regime)? Yes   Comments regarding appointments Cards apt on 4/18/23   Does the patient have a primary care provider?  Yes   Comments regarding PCP PCP apt on 4/14/23   Has the patient kept scheduled appointments due by today? N/A   What DME was ordered? [facility]   Has all DME been delivered? --  [Rep contacted pt and stated [facility] will be there within the week ]   Psychosocial issues? No   Did the patient receive a copy of their discharge instructions? Yes   Nursing interventions Reviewed instructions with patient   What is the patient's perception of their health status since discharge? Improving   Is the patient/caregiver able to teach back signs and symptoms related to disease process for when to call PCP? Yes   Is the patient/caregiver able to teach back signs and symptoms related to disease process for when to call 911? Yes   Is the patient/caregiver able to teach back the hierarchy of who to call/visit for symptoms/problems? PCP, Specialist, Home health nurse, Urgent Care, ED, 911 Yes   If the patient is a current smoker, are they able to teach back resources for cessation? Not a smoker   Week 1 call  completed? Yes          Lily H - Registered Nurse

## 2023-04-16 LAB
QT INTERVAL: 372 MS
QTC INTERVAL: 467 MS

## 2023-04-21 LAB
MYCOBACTERIUM SPEC CULT: NORMAL
NIGHT BLUE STAIN TISS: NORMAL
NIGHT BLUE STAIN TISS: NORMAL

## 2023-04-28 LAB
MYCOBACTERIUM SPEC CULT: NORMAL
NIGHT BLUE STAIN TISS: NORMAL
NIGHT BLUE STAIN TISS: NORMAL

## 2023-05-05 LAB
MYCOBACTERIUM SPEC CULT: NORMAL
NIGHT BLUE STAIN TISS: NORMAL
NIGHT BLUE STAIN TISS: NORMAL

## 2023-05-12 LAB
MYCOBACTERIUM SPEC CULT: NORMAL
NIGHT BLUE STAIN TISS: NORMAL
NIGHT BLUE STAIN TISS: NORMAL

## 2023-05-19 LAB
MYCOBACTERIUM SPEC CULT: NORMAL
NIGHT BLUE STAIN TISS: NORMAL
NIGHT BLUE STAIN TISS: NORMAL

## (undated) DEVICE — DRSNG SURESITE123 6X8IN

## (undated) DEVICE — BARD NAVARRE UNIVERSAL GRAVITY DRAINAGE BAG: Brand: BARD DRAINAGE BAG

## (undated) DEVICE — PK CATH CARD 30 CA/4

## (undated) DEVICE — CANN NASL ETCO2 LO/FLO A/

## (undated) DEVICE — DRAPE,ANGIO,BRACH,STERILE,38X44: Brand: MEDLINE

## (undated) DEVICE — PAD, DEFIB, ADULT, RADIOTRANS, PHYSIO: Brand: MEDLINE

## (undated) DEVICE — KT PERICARDIOCENTES 8.3FCUST BHPAD

## (undated) DEVICE — BIOPATCH™ ANTIMICROBIAL DRESSING WITH CHLORHEXIDINE GLUCONATE IS A HYDROPHILLIC POLYURETHANE ABSORPTIVE FOAM WITH CHLORHEXIDINE GLUCONATE (CHG) WHICH INHIBITS BACTERIAL GROWTH UNDER THE DRESSING. THE DRESSING IS INTENDED TO BE USED TO ABSORB EXUDATE, COVER A WOUND CAUSED BY VASCULAR AND NONVASCULAR PERCUTANEOUS MEDICAL DEVICES DURING SURGERY, AS WELL AS REDUCE LOCAL INFECTION AND COLONIZATION OF MICROORGANISMS.: Brand: BIOPATCH

## (undated) DEVICE — RADIFOCUS GLIDEWIRE ADVANTAGE GUIDEWIRE: Brand: GLIDEWIRE ADVANTAGE